# Patient Record
Sex: FEMALE | Employment: PART TIME | URBAN - METROPOLITAN AREA
[De-identification: names, ages, dates, MRNs, and addresses within clinical notes are randomized per-mention and may not be internally consistent; named-entity substitution may affect disease eponyms.]

---

## 2022-05-26 ENCOUNTER — HOSPITAL ENCOUNTER (OUTPATIENT)
Dept: PREADMISSION TESTING | Age: 67
Discharge: HOME | End: 2022-05-30
Payer: MEDICARE

## 2022-05-26 VITALS
HEART RATE: 78 BPM | SYSTOLIC BLOOD PRESSURE: 138 MMHG | DIASTOLIC BLOOD PRESSURE: 78 MMHG | HEIGHT: 65 IN | RESPIRATION RATE: 16 BRPM | BODY MASS INDEX: 38.49 KG/M2 | OXYGEN SATURATION: 98 % | TEMPERATURE: 97.5 F | WEIGHT: 231 LBS

## 2022-05-26 LAB
-: NORMAL
ABO/RH: NORMAL
ABSOLUTE EOS #: 0.17 K/UL (ref 0–0.44)
ABSOLUTE IMMATURE GRANULOCYTE: 0.04 K/UL (ref 0–0.3)
ABSOLUTE LYMPH #: 2.12 K/UL (ref 1.1–3.7)
ABSOLUTE MONO #: 0.76 K/UL (ref 0.1–1.2)
ALBUMIN SERPL-MCNC: 4.5 G/DL (ref 3.5–5.2)
ALP BLD-CCNC: 74 U/L (ref 35–104)
ALT SERPL-CCNC: 12 U/L (ref 5–33)
ANION GAP SERPL CALCULATED.3IONS-SCNC: 9 MMOL/L (ref 9–17)
ANTIBODY SCREEN: NEGATIVE
ARM BAND NUMBER: NORMAL
AST SERPL-CCNC: 19 U/L
BASOPHILS # BLD: 1 % (ref 0–2)
BASOPHILS ABSOLUTE: 0.05 K/UL (ref 0–0.2)
BILIRUB SERPL-MCNC: 0.26 MG/DL (ref 0.3–1.2)
BILIRUBIN URINE: NEGATIVE
BUN BLDV-MCNC: 16 MG/DL (ref 8–23)
BUN/CREAT BLD: 16 (ref 9–20)
CALCIUM SERPL-MCNC: 9.6 MG/DL (ref 8.6–10.4)
CHLORIDE BLD-SCNC: 104 MMOL/L (ref 98–107)
CO2: 27 MMOL/L (ref 20–31)
COLOR: YELLOW
CREAT SERPL-MCNC: 1 MG/DL (ref 0.5–0.9)
EOSINOPHILS RELATIVE PERCENT: 2 % (ref 1–4)
EPITHELIAL CELLS UA: NORMAL /HPF (ref 0–5)
EXPIRATION DATE: NORMAL
GFR AFRICAN AMERICAN: >60 ML/MIN
GFR NON-AFRICAN AMERICAN: 55 ML/MIN
GFR SERPL CREATININE-BSD FRML MDRD: ABNORMAL ML/MIN/{1.73_M2}
GLUCOSE BLD-MCNC: 126 MG/DL (ref 70–99)
GLUCOSE URINE: NEGATIVE
HCT VFR BLD CALC: 36.7 % (ref 36.3–47.1)
HEMOGLOBIN: 11.8 G/DL (ref 11.9–15.1)
IMMATURE GRANULOCYTES: 1 %
INR BLD: 0.9
KETONES, URINE: NEGATIVE
LEUKOCYTE ESTERASE, URINE: NEGATIVE
LYMPHOCYTES # BLD: 25 % (ref 24–43)
MCH RBC QN AUTO: 31.8 PG (ref 25.2–33.5)
MCHC RBC AUTO-ENTMCNC: 32.2 G/DL (ref 28.4–34.8)
MCV RBC AUTO: 98.9 FL (ref 82.6–102.9)
MONOCYTES # BLD: 9 % (ref 3–12)
NITRITE, URINE: NEGATIVE
NRBC AUTOMATED: 0 PER 100 WBC
PARTIAL THROMBOPLASTIN TIME: 27.5 SEC (ref 23.9–33.8)
PDW BLD-RTO: 12.7 % (ref 11.8–14.4)
PH UA: 5.5 (ref 5–8)
PLATELET # BLD: 253 K/UL (ref 138–453)
PMV BLD AUTO: 10.3 FL (ref 8.1–13.5)
POTASSIUM SERPL-SCNC: 4 MMOL/L (ref 3.7–5.3)
PROTEIN UA: NEGATIVE
PROTHROMBIN TIME: 12.2 SEC (ref 11.5–14.2)
RBC # BLD: 3.71 M/UL (ref 3.95–5.11)
RBC UA: NORMAL /HPF (ref 0–2)
SEDIMENTATION RATE, ERYTHROCYTE: 5 MM/HR (ref 0–30)
SEG NEUTROPHILS: 62 % (ref 36–65)
SEGMENTED NEUTROPHILS ABSOLUTE COUNT: 5.24 K/UL (ref 1.5–8.1)
SODIUM BLD-SCNC: 140 MMOL/L (ref 135–144)
SPECIFIC GRAVITY UA: 1.01 (ref 1–1.03)
TOTAL PROTEIN: 6.8 G/DL (ref 6.4–8.3)
TURBIDITY: CLEAR
URINE HGB: ABNORMAL
UROBILINOGEN, URINE: NORMAL
WBC # BLD: 8.4 K/UL (ref 3.5–11.3)
WBC UA: NORMAL /HPF (ref 0–5)

## 2022-05-26 PROCEDURE — 86900 BLOOD TYPING SEROLOGIC ABO: CPT

## 2022-05-26 PROCEDURE — 93005 ELECTROCARDIOGRAM TRACING: CPT | Performed by: ORTHOPAEDIC SURGERY

## 2022-05-26 PROCEDURE — 85025 COMPLETE CBC W/AUTO DIFF WBC: CPT

## 2022-05-26 PROCEDURE — 85610 PROTHROMBIN TIME: CPT

## 2022-05-26 PROCEDURE — 85652 RBC SED RATE AUTOMATED: CPT

## 2022-05-26 PROCEDURE — 80053 COMPREHEN METABOLIC PANEL: CPT

## 2022-05-26 PROCEDURE — 86850 RBC ANTIBODY SCREEN: CPT

## 2022-05-26 PROCEDURE — 36415 COLL VENOUS BLD VENIPUNCTURE: CPT

## 2022-05-26 PROCEDURE — 86901 BLOOD TYPING SEROLOGIC RH(D): CPT

## 2022-05-26 PROCEDURE — 81001 URINALYSIS AUTO W/SCOPE: CPT

## 2022-05-26 PROCEDURE — 87641 MR-STAPH DNA AMP PROBE: CPT

## 2022-05-26 PROCEDURE — 85730 THROMBOPLASTIN TIME PARTIAL: CPT

## 2022-05-26 RX ORDER — SIMVASTATIN 10 MG
10 TABLET ORAL NIGHTLY
COMMUNITY
Start: 2021-05-17

## 2022-05-26 RX ORDER — OXYCODONE HYDROCHLORIDE AND ACETAMINOPHEN 5; 325 MG/1; MG/1
1 TABLET ORAL EVERY 4 HOURS PRN
COMMUNITY
Start: 2018-12-03

## 2022-05-26 RX ORDER — LEVOTHYROXINE SODIUM 88 UG/1
88 TABLET ORAL DAILY
COMMUNITY
Start: 2013-04-24

## 2022-05-26 RX ORDER — POTASSIUM CHLORIDE 750 MG/1
10 TABLET, FILM COATED, EXTENDED RELEASE ORAL PRN
COMMUNITY

## 2022-05-26 RX ORDER — CELECOXIB 200 MG/1
200 CAPSULE ORAL ONCE
Status: CANCELLED | OUTPATIENT
Start: 2022-06-20

## 2022-05-26 RX ORDER — RIZATRIPTAN BENZOATE 5 MG/1
5 TABLET ORAL
COMMUNITY
Start: 2020-05-29

## 2022-05-26 RX ORDER — CHLORAL HYDRATE 500 MG
2000 CAPSULE ORAL DAILY
Status: ON HOLD | COMMUNITY
End: 2022-07-01

## 2022-05-26 RX ORDER — FUROSEMIDE 20 MG/1
20 TABLET ORAL
COMMUNITY
Start: 2021-12-03

## 2022-05-26 RX ORDER — DICLOFENAC SODIUM 75 MG/1
75 TABLET, DELAYED RELEASE ORAL 2 TIMES DAILY
Status: ON HOLD | COMMUNITY
Start: 2017-04-23 | End: 2022-06-20 | Stop reason: HOSPADM

## 2022-05-26 RX ORDER — ACETAMINOPHEN 500 MG
1000 TABLET ORAL ONCE
Status: CANCELLED | OUTPATIENT
Start: 2022-06-20

## 2022-05-26 RX ORDER — OMEPRAZOLE 40 MG/1
40 CAPSULE, DELAYED RELEASE ORAL DAILY
COMMUNITY
Start: 2013-04-24

## 2022-05-26 RX ORDER — UBIDECARENONE 200 MG
1 CAPSULE ORAL DAILY
COMMUNITY
Start: 2020-06-06

## 2022-05-26 RX ORDER — AMITRIPTYLINE HYDROCHLORIDE 50 MG/1
75 TABLET, FILM COATED ORAL DAILY
Status: ON HOLD | COMMUNITY
Start: 2017-04-23 | End: 2022-06-20 | Stop reason: ALTCHOICE

## 2022-05-26 RX ORDER — DENOSUMAB 60 MG/ML
60 INJECTION SUBCUTANEOUS ONCE
COMMUNITY

## 2022-05-26 RX ORDER — DILTIAZEM HYDROCHLORIDE 240 MG/1
240 CAPSULE, EXTENDED RELEASE ORAL DAILY
COMMUNITY
Start: 2021-08-02

## 2022-05-26 RX ORDER — GABAPENTIN 300 MG/1
300 CAPSULE ORAL ONCE
Status: CANCELLED | OUTPATIENT
Start: 2022-06-20

## 2022-05-26 RX ORDER — LIOTHYRONINE SODIUM 5 UG/1
5 TABLET ORAL DAILY
COMMUNITY
Start: 2013-04-24

## 2022-05-26 RX ORDER — ZOLPIDEM TARTRATE 6.25 MG/1
1 TABLET, FILM COATED, EXTENDED RELEASE ORAL EVERY EVENING
COMMUNITY
Start: 2021-12-13

## 2022-05-26 RX ORDER — METOPROLOL SUCCINATE 200 MG/1
200 TABLET, EXTENDED RELEASE ORAL DAILY
COMMUNITY
Start: 2021-05-18

## 2022-05-26 RX ORDER — LOSARTAN POTASSIUM 100 MG/1
1 TABLET ORAL DAILY
COMMUNITY
Start: 2021-12-13

## 2022-05-26 ASSESSMENT — KOOS JR
KOOS JR TOTAL INTERVAL SCORE: 57.14
STRAIGHTENING KNEE FULLY: 1
STANDING UPRIGHT: 1
RISING FROM SITTING: 2
GOING UP OR DOWN STAIRS: 2
HOW SEVERE IS YOUR KNEE STIFFNESS AFTER FIRST WAKING IN MORNING: 2
TWISING OR PIVOTING ON KNEE: 2
BENDING TO THE FLOOR TO PICK UP OBJECT: 2

## 2022-05-26 ASSESSMENT — PROMIS GLOBAL HEALTH SCALE
IN GENERAL, HOW WOULD YOU RATE YOUR PHYSICAL HEALTH [ON A SCALE OF 1 (POOR) TO 5 (EXCELLENT)]?: 3
IN THE PAST 7 DAYS, HOW OFTEN HAVE YOU BEEN BOTHERED BY EMOTIONAL PROBLEMS, SUCH AS FEELING ANXIOUS, DEPRESSED, OR IRRITABLE [ON A SCALE FROM 1 (NEVER) TO 5 (ALWAYS)]?: 2
IN GENERAL, WOULD YOU SAY YOUR HEALTH IS...[ON A SCALE OF 1 (POOR) TO 5 (EXCELLENT)]: 3
IN THE PAST 7 DAYS, HOW WOULD YOU RATE YOUR FATIGUE ON AVERAGE [ON A SCALE FROM 1 (NONE) TO 5 (VERY SEVERE)]?: 3
IN GENERAL, HOW WOULD YOU RATE YOUR SATISFACTION WITH YOUR SOCIAL ACTIVITIES AND RELATIONSHIPS [ON A SCALE OF 1 (POOR) TO 5 (EXCELLENT)]?: 5
IN GENERAL, WOULD YOU SAY YOUR QUALITY OF LIFE IS...[ON A SCALE OF 1 (POOR) TO 5 (EXCELLENT)]: 4
IN GENERAL, HOW WOULD YOU RATE YOUR MENTAL HEALTH, INCLUDING YOUR MOOD AND YOUR ABILITY TO THINK [ON A SCALE OF 1 (POOR) TO 5 (EXCELLENT)]?: 5
SUM OF RESPONSES TO QUESTIONS 2, 4, 5, & 10: 16
TO WHAT EXTENT ARE YOU ABLE TO CARRY OUT YOUR EVERYDAY PHYSICAL ACTIVITIES SUCH AS WALKING, CLIMBING STAIRS, CARRYING GROCERIES, OR MOVING A CHAIR [ON A SCALE OF 1 (NOT AT ALL) TO 5 (COMPLETELY)]?: 3
IN GENERAL, PLEASE RATE HOW WELL YOU CARRY OUT YOUR USUAL SOCIAL ACTIVITIES (INCLUDES ACTIVITIES AT HOME, AT WORK, AND IN YOUR COMMUNITY, AND RESPONSIBILITIES AS A PARENT, CHILD, SPOUSE, EMPLOYEE, FRIEND, ETC) [ON A SCALE OF 1 (POOR) TO 5 (EXCELLENT)]?: 3
WHO IS THE PERSON COMPLETING THE PROMIS V1.1 SURVEY?: 0
SUM OF RESPONSES TO QUESTIONS 3, 6, 7, & 8: 17
IN THE PAST 7 DAYS, HOW WOULD YOU RATE YOUR PAIN ON AVERAGE [ON A SCALE FROM 0 (NO PAIN) TO 10 (WORST IMAGINABLE PAIN)]?: 8
HOW IS THE PROMIS V1.1 BEING ADMINISTERED?: 0

## 2022-05-26 ASSESSMENT — PAIN - FUNCTIONAL ASSESSMENT: PAIN_FUNCTIONAL_ASSESSMENT: PREVENTS OR INTERFERES SOME ACTIVE ACTIVITIES AND ADLS

## 2022-05-26 ASSESSMENT — PAIN DESCRIPTION - DESCRIPTORS: DESCRIPTORS: STABBING

## 2022-05-26 ASSESSMENT — PAIN DESCRIPTION - ORIENTATION: ORIENTATION: LEFT

## 2022-05-26 ASSESSMENT — PAIN DESCRIPTION - FREQUENCY: FREQUENCY: CONTINUOUS

## 2022-05-26 ASSESSMENT — PAIN DESCRIPTION - ONSET: ONSET: ON-GOING

## 2022-05-26 ASSESSMENT — PAIN DESCRIPTION - LOCATION: LOCATION: KNEE

## 2022-05-26 ASSESSMENT — PAIN SCALES - GENERAL: PAINLEVEL_OUTOF10: 8

## 2022-05-26 ASSESSMENT — PAIN DESCRIPTION - PAIN TYPE: TYPE: CHRONIC PAIN

## 2022-05-26 NOTE — PRE-PROCEDURE INSTRUCTIONS
ARRIVE AT [facility] ON Monday, June 20, 2022 at 5:30 AM    Once you enter the hospital lobby, take the elevators to the second floor. Check-In is at the surgery registration desk      Continue to take your home medications as you normally do up to and including the night before surgery with the exception of any blood thinning medications. Please stop any blood thinning medications as directed by your surgeon or prescribing physician. Failure to stop certain medications may interfere with your scheduled surgery. These may include:  Aspirin, Warfarin (Coumadin), Clopidogrel (Plavix), Ibuprofen (Motrin, Advil), Naproxen (Aleve), Meloxicam (Mobic), Celecoxib (Celebrex), Eliquis, Pradaxa, Xarelto, Effient, Fish Oil, Herbal supplements. OMEGA-3, Diclofenac, losartan      Please take the following medication(s) the day of surgery with a small sip of water:  Omeprazole, levothyroxine, diltiazem      PREPARING FOR YOUR SURGERY:     Before surgery, you can play an important role in your own health. Because skin is not sterile, we need to be sure that your skin is as free of germs as possible before surgery by carefully washing before surgery. Preparing or prepping skin before surgery can reduce the risk of a surgical site infection.   Do not shave the area of your body where your surgery will be performed unless you received specific permission from your physician. You will need to shower at home the night before surgery and the morning of surgery with a special soap called chlorhexidine gluconate (CHG*). *Not to be used by people allergic to Chlorhexidine Gluconate (CHG). Following these instructions will help you be sure that your skin is clean before surgery. Instructions on cleaning your skin before surgery: The night before your surgery:      You will need to shower with warm water (not hot) and the CHG soap.  Use a clean wash cloth and a clean towel.   Have clean clothes available to put on after the shower.   First wash your hair with regular shampoo. Rinse your hair and body thoroughly to remove the shampoo.  Wash your face and genital area (private parts) with your regular soap or water only. Thoroughly rinse your body with warm water from the neck down.  Turn water off to prevent rinsing the soap off too soon.  With a clean wet washcloth and half of the CHG soap in the bottle, lather your entire body from the neck down. Do not use CHG soap near your eyes or ears to avoid injury to those areas.  Wash thoroughly, paying special attention to the area where your surgery will be performed.  Wash your body gently for five (5) minutes. Avoid scrubbing your skin too hard.  Turn the water back on and rinse your body thoroughly.  Pat yourself dry with a clean, soft towel. Do not apply lotion, cream or powder.  Dress with clean freshly washed clothes. The morning of surgery:     Repeat shower following steps above - using remaining half of CHG soap in bottle. Patient Instructions:    Valderrama If you are having any type of anesthesia you are to have nothing to eat or drink after midnight the night before your surgery. This includes gum, hard candy, mints, water or smoking or chewing tobacco.  The only exception to this is a small sip of water to take with any morning dose of heart, blood pressure, or seizure medications. No alcoholic beverages for 24 hours prior to surgery.  Brush your teeth but do not swallow water.  Bring your eye glasses and case with you. No contacts are to be worn the day of surgery. You also may bring your hearing aids. Most surgical procedures involving anesthesia will require that you remove your dentures prior to surgery. · Do not wear any jewelry or body piercings day of surgery. Also, NO lotion, perfume or deodorant to be used the day of surgery.   No nail polish on the operative extremity (arm/leg surgeries)    · If you are staying overnight with us, please bring a small bag of necessary personal items.  Please wear loose, comfortable clothing. If you are potentially going to have a cast or brace bring clothing that will fit over them.  In case of illness - If you have cold or flu like symptoms (high fever, runny nose, sore throat, cough, etc.) rash, nausea, vomiting, loose stools, and/or recent contact with someone who has a contagious disease (chicken pox, measles, etc.) Please call your doctor before coming to the hospital.         Day of Surgery/Procedure:    As a patient at [facility] - INPATIENT you can expect quality medical and nursing care that is centered on your individual needs. Our goal is to make your surgical experience as comfortable as possible    . Transportation After Your Surgery/Procedure: You will need a friend or family member to drive you home after your procedure. Your  must be 25years of age or older and able to sign off on your discharge instructions. A taxi cab or any other form of public transportation is not acceptable. Your friend or family member must stay at the hospital throughout your procedure. Someone must remain with you for the first 24 hours after your surgery if you receive anesthesia or medication. If you do not have someone to stay with you, your procedure may be cancelled.       If you have any other questions regarding your procedure or the day of surgery, please call [phone]      _________________________  ____________________________  Signature (Patient)              Signature (Provider)               Date

## 2022-05-27 LAB
EKG ATRIAL RATE: 78 BPM
EKG P AXIS: 63 DEGREES
EKG P-R INTERVAL: 180 MS
EKG Q-T INTERVAL: 360 MS
EKG QRS DURATION: 92 MS
EKG QTC CALCULATION (BAZETT): 410 MS
EKG R AXIS: 10 DEGREES
EKG T AXIS: 21 DEGREES
EKG VENTRICULAR RATE: 78 BPM
MRSA, DNA, NASAL: NEGATIVE
SPECIMEN DESCRIPTION: NORMAL

## 2022-05-27 PROCEDURE — 93010 ELECTROCARDIOGRAM REPORT: CPT | Performed by: INTERNAL MEDICINE

## 2022-05-31 NOTE — PROGRESS NOTES
[facility] ([facility]) Joint Replacement Pre-surgical Assessment    Scheduled Surgery Date: 06/20/2022  Surgery Time: 0730    Surgeon: Shayy Chapman  Procedure: left Total Knee    Primary Insurance Coverage MEDICARE A&B  Pre-op class attended YES    PCP: Ruba Hirsch MD  Clearance received by PCP: Yes    Anticipated Discharge Plan: home  Agency (if applicable): UNSURE    Significant PMH:   Surgical History    Procedure Laterality Date Comment Source   ANKLE SURGERY Left  x3    BREAST BIOPSY Right      DENTAL SURGERY   pt has permanant implanted dentures    HYSTERECTOMY       KNEE ARTHROSCOPY Left      LYMPHADENECTOMY Right  axilla, not cancer    PAIN MANAGEMENT PROCEDURE   back ablations, last @/2022        ED Notes    ED Notes      Medical History    Diagnosis Date Comment Source   Arthritis      CAD (coronary artery disease)      CHF (congestive heart failure) (HCC)  pt has not been diagnosed however on medications for her heart and a diuretic, assumes CHF    CPAP (continuous positive airway pressure) dependence      DJD (degenerative joint disease)  left ankle, left knee, rght knee    Fibromyalgia      GERD (gastroesophageal reflux disease)      Hyperlipidemia      Hypertension      Mitral valve regurgitation      Seasonal allergies      Sleep apnea      Spinal stenosis      Thyroid disease  hashimotos             Smoking history: none    Alcohol history: Never drinks    Concerns prior to surgery: PT MET WITH PT/OT AFTER CLASS, UNSURE IF PT HAS A WALKER.     Electronically signed by: Codi Hall RN on 5/31/2022 at 1:20 PM

## 2022-06-20 ENCOUNTER — ANESTHESIA (OUTPATIENT)
Dept: OPERATING ROOM | Age: 67
End: 2022-06-20
Payer: MEDICARE

## 2022-06-20 ENCOUNTER — APPOINTMENT (OUTPATIENT)
Dept: GENERAL RADIOLOGY | Age: 67
End: 2022-06-20
Attending: ORTHOPAEDIC SURGERY
Payer: MEDICARE

## 2022-06-20 ENCOUNTER — HOSPITAL ENCOUNTER (OUTPATIENT)
Age: 67
Discharge: HOME | End: 2022-06-21
Attending: ORTHOPAEDIC SURGERY | Admitting: ORTHOPAEDIC SURGERY
Payer: MEDICARE

## 2022-06-20 ENCOUNTER — ANESTHESIA EVENT (OUTPATIENT)
Dept: OPERATING ROOM | Age: 67
End: 2022-06-20
Payer: MEDICARE

## 2022-06-20 PROBLEM — M17.12 PRIMARY OSTEOARTHRITIS OF LEFT KNEE: Chronic | Status: RESOLVED | Noted: 2022-06-20 | Resolved: 2022-06-20

## 2022-06-20 PROBLEM — M17.12 PRIMARY OSTEOARTHRITIS OF LEFT KNEE: Chronic | Status: ACTIVE | Noted: 2022-06-20

## 2022-06-20 PROBLEM — M17.12 PRIMARY OSTEOARTHRITIS OF LEFT KNEE: Status: ACTIVE | Noted: 2022-06-20

## 2022-06-20 PROBLEM — Z96.652 S/P TOTAL KNEE ARTHROPLASTY, LEFT: Status: ACTIVE | Noted: 2022-06-20

## 2022-06-20 PROCEDURE — 2580000003 HC RX 258: Performed by: ORTHOPAEDIC SURGERY

## 2022-06-20 PROCEDURE — 6360000002 HC RX W HCPCS: Performed by: ANESTHESIOLOGY

## 2022-06-20 PROCEDURE — 2500000003 HC RX 250 WO HCPCS: Performed by: ANESTHESIOLOGY

## 2022-06-20 PROCEDURE — C1713 ANCHOR/SCREW BN/BN,TIS/BN: HCPCS | Performed by: ORTHOPAEDIC SURGERY

## 2022-06-20 PROCEDURE — 7100000000 HC PACU RECOVERY - FIRST 15 MIN: Performed by: ORTHOPAEDIC SURGERY

## 2022-06-20 PROCEDURE — A4217 STERILE WATER/SALINE, 500 ML: HCPCS | Performed by: ORTHOPAEDIC SURGERY

## 2022-06-20 PROCEDURE — 97166 OT EVAL MOD COMPLEX 45 MIN: CPT

## 2022-06-20 PROCEDURE — 97110 THERAPEUTIC EXERCISES: CPT

## 2022-06-20 PROCEDURE — 3700000001 HC ADD 15 MINUTES (ANESTHESIA): Performed by: ORTHOPAEDIC SURGERY

## 2022-06-20 PROCEDURE — 2500000003 HC RX 250 WO HCPCS: Performed by: NURSE ANESTHETIST, CERTIFIED REGISTERED

## 2022-06-20 PROCEDURE — 3600000015 HC SURGERY LEVEL 5 ADDTL 15MIN: Performed by: ORTHOPAEDIC SURGERY

## 2022-06-20 PROCEDURE — 6360000002 HC RX W HCPCS: Performed by: NURSE ANESTHETIST, CERTIFIED REGISTERED

## 2022-06-20 PROCEDURE — 6360000002 HC RX W HCPCS: Performed by: ORTHOPAEDIC SURGERY

## 2022-06-20 PROCEDURE — 3700000000 HC ANESTHESIA ATTENDED CARE: Performed by: ORTHOPAEDIC SURGERY

## 2022-06-20 PROCEDURE — 2580000003 HC RX 258: Performed by: NURSE ANESTHETIST, CERTIFIED REGISTERED

## 2022-06-20 PROCEDURE — 76942 ECHO GUIDE FOR BIOPSY: CPT | Performed by: ANESTHESIOLOGY

## 2022-06-20 PROCEDURE — 7100000001 HC PACU RECOVERY - ADDTL 15 MIN: Performed by: ORTHOPAEDIC SURGERY

## 2022-06-20 PROCEDURE — C9290 INJ, BUPIVACAINE LIPOSOME: HCPCS | Performed by: ANESTHESIOLOGY

## 2022-06-20 PROCEDURE — 3600000005 HC SURGERY LEVEL 5 BASE: Performed by: ORTHOPAEDIC SURGERY

## 2022-06-20 PROCEDURE — 97162 PT EVAL MOD COMPLEX 30 MIN: CPT

## 2022-06-20 PROCEDURE — C1776 JOINT DEVICE (IMPLANTABLE): HCPCS | Performed by: ORTHOPAEDIC SURGERY

## 2022-06-20 PROCEDURE — 6370000000 HC RX 637 (ALT 250 FOR IP): Performed by: ANESTHESIOLOGY

## 2022-06-20 PROCEDURE — 73560 X-RAY EXAM OF KNEE 1 OR 2: CPT

## 2022-06-20 PROCEDURE — 6370000000 HC RX 637 (ALT 250 FOR IP): Performed by: ORTHOPAEDIC SURGERY

## 2022-06-20 PROCEDURE — 2709999900 HC NON-CHARGEABLE SUPPLY: Performed by: ORTHOPAEDIC SURGERY

## 2022-06-20 PROCEDURE — 97530 THERAPEUTIC ACTIVITIES: CPT

## 2022-06-20 PROCEDURE — 97535 SELF CARE MNGMENT TRAINING: CPT

## 2022-06-20 PROCEDURE — 2500000003 HC RX 250 WO HCPCS: Performed by: ORTHOPAEDIC SURGERY

## 2022-06-20 PROCEDURE — 2720000010 HC SURG SUPPLY STERILE: Performed by: ORTHOPAEDIC SURGERY

## 2022-06-20 DEVICE — IMPLANT PAT 35X7MM CR IPOLY ITOTAL: Type: IMPLANTABLE DEVICE | Site: KNEE | Status: FUNCTIONAL

## 2022-06-20 DEVICE — CEMENT BNE 40GM FULL DOSE PMMA W/O ANTIBIO HI VISC N RADPQ: Type: IMPLANTABLE DEVICE | Site: KNEE | Status: FUNCTIONAL

## 2022-06-20 RX ORDER — HYDROMORPHONE HYDROCHLORIDE 2 MG/1
1 TABLET ORAL EVERY 4 HOURS PRN
Status: DISCONTINUED | OUTPATIENT
Start: 2022-06-20 | End: 2022-06-21 | Stop reason: HOSPADM

## 2022-06-20 RX ORDER — PHENYLEPHRINE HCL IN 0.9% NACL 1 MG/10 ML
SYRINGE (ML) INTRAVENOUS PRN
Status: DISCONTINUED | OUTPATIENT
Start: 2022-06-20 | End: 2022-06-20 | Stop reason: SDUPTHER

## 2022-06-20 RX ORDER — SODIUM CHLORIDE 9 MG/ML
INJECTION, SOLUTION INTRAVENOUS PRN
Status: DISCONTINUED | OUTPATIENT
Start: 2022-06-20 | End: 2022-06-20 | Stop reason: HOSPADM

## 2022-06-20 RX ORDER — OMEPRAZOLE 40 MG/1
40 CAPSULE, DELAYED RELEASE ORAL
Status: DISCONTINUED | OUTPATIENT
Start: 2022-06-21 | End: 2022-06-21 | Stop reason: HOSPADM

## 2022-06-20 RX ORDER — SODIUM CHLORIDE, SODIUM LACTATE, POTASSIUM CHLORIDE, CALCIUM CHLORIDE 600; 310; 30; 20 MG/100ML; MG/100ML; MG/100ML; MG/100ML
INJECTION, SOLUTION INTRAVENOUS CONTINUOUS PRN
Status: DISCONTINUED | OUTPATIENT
Start: 2022-06-20 | End: 2022-06-20 | Stop reason: SDUPTHER

## 2022-06-20 RX ORDER — HYDROMORPHONE HYDROCHLORIDE 1 MG/ML
0.25 INJECTION, SOLUTION INTRAMUSCULAR; INTRAVENOUS; SUBCUTANEOUS EVERY 5 MIN PRN
Status: DISCONTINUED | OUTPATIENT
Start: 2022-06-20 | End: 2022-06-20 | Stop reason: HOSPADM

## 2022-06-20 RX ORDER — LOSARTAN POTASSIUM 100 MG/1
100 TABLET ORAL DAILY
Status: DISCONTINUED | OUTPATIENT
Start: 2022-06-21 | End: 2022-06-21 | Stop reason: HOSPADM

## 2022-06-20 RX ORDER — HYDROMORPHONE HCL 110MG/55ML
PATIENT CONTROLLED ANALGESIA SYRINGE INTRAVENOUS PRN
Status: DISCONTINUED | OUTPATIENT
Start: 2022-06-20 | End: 2022-06-20 | Stop reason: SDUPTHER

## 2022-06-20 RX ORDER — SODIUM CHLORIDE, SODIUM LACTATE, POTASSIUM CHLORIDE, CALCIUM CHLORIDE 600; 310; 30; 20 MG/100ML; MG/100ML; MG/100ML; MG/100ML
INJECTION, SOLUTION INTRAVENOUS CONTINUOUS
Status: DISCONTINUED | OUTPATIENT
Start: 2022-06-20 | End: 2022-06-20

## 2022-06-20 RX ORDER — OMEGA-3/DHA/EPA/FISH OIL 300-1000MG
2 CAPSULE ORAL 2 TIMES DAILY
Status: ON HOLD | COMMUNITY
End: 2022-07-01

## 2022-06-20 RX ORDER — LIDOCAINE HYDROCHLORIDE 20 MG/ML
INJECTION, SOLUTION EPIDURAL; INFILTRATION; INTRACAUDAL; PERINEURAL PRN
Status: DISCONTINUED | OUTPATIENT
Start: 2022-06-20 | End: 2022-06-20 | Stop reason: SDUPTHER

## 2022-06-20 RX ORDER — KETAMINE HCL IN NACL, ISO-OSM 100MG/10ML
SYRINGE (ML) INJECTION PRN
Status: DISCONTINUED | OUTPATIENT
Start: 2022-06-20 | End: 2022-06-20 | Stop reason: SDUPTHER

## 2022-06-20 RX ORDER — MORPHINE SULFATE 2 MG/ML
2 INJECTION, SOLUTION INTRAMUSCULAR; INTRAVENOUS
Status: DISCONTINUED | OUTPATIENT
Start: 2022-06-20 | End: 2022-06-21 | Stop reason: HOSPADM

## 2022-06-20 RX ORDER — MORPHINE SULFATE 4 MG/ML
4 INJECTION, SOLUTION INTRAMUSCULAR; INTRAVENOUS
Status: DISCONTINUED | OUTPATIENT
Start: 2022-06-20 | End: 2022-06-21 | Stop reason: HOSPADM

## 2022-06-20 RX ORDER — ONDANSETRON 2 MG/ML
INJECTION INTRAMUSCULAR; INTRAVENOUS PRN
Status: DISCONTINUED | OUTPATIENT
Start: 2022-06-20 | End: 2022-06-20 | Stop reason: SDUPTHER

## 2022-06-20 RX ORDER — DILTIAZEM HYDROCHLORIDE 240 MG/1
240 CAPSULE, COATED, EXTENDED RELEASE ORAL DAILY
Status: DISCONTINUED | OUTPATIENT
Start: 2022-06-21 | End: 2022-06-21 | Stop reason: HOSPADM

## 2022-06-20 RX ORDER — LEVOTHYROXINE SODIUM 88 UG/1
88 TABLET ORAL DAILY
Status: DISCONTINUED | OUTPATIENT
Start: 2022-06-21 | End: 2022-06-21 | Stop reason: HOSPADM

## 2022-06-20 RX ORDER — MAGNESIUM HYDROXIDE 1200 MG/15ML
LIQUID ORAL CONTINUOUS PRN
Status: COMPLETED | OUTPATIENT
Start: 2022-06-20 | End: 2022-06-20

## 2022-06-20 RX ORDER — SODIUM CHLORIDE 0.9 % (FLUSH) 0.9 %
5-40 SYRINGE (ML) INJECTION EVERY 12 HOURS SCHEDULED
Status: DISCONTINUED | OUTPATIENT
Start: 2022-06-20 | End: 2022-06-21 | Stop reason: HOSPADM

## 2022-06-20 RX ORDER — FENTANYL CITRATE 50 UG/ML
INJECTION, SOLUTION INTRAMUSCULAR; INTRAVENOUS PRN
Status: DISCONTINUED | OUTPATIENT
Start: 2022-06-20 | End: 2022-06-20 | Stop reason: SDUPTHER

## 2022-06-20 RX ORDER — TIZANIDINE 4 MG/1
4 TABLET ORAL EVERY 12 HOURS
COMMUNITY

## 2022-06-20 RX ORDER — HYDROMORPHONE HYDROCHLORIDE 2 MG/1
2 TABLET ORAL EVERY 4 HOURS PRN
Status: DISCONTINUED | OUTPATIENT
Start: 2022-06-20 | End: 2022-06-21 | Stop reason: HOSPADM

## 2022-06-20 RX ORDER — LIDOCAINE HYDROCHLORIDE 10 MG/ML
1 INJECTION, SOLUTION EPIDURAL; INFILTRATION; INTRACAUDAL; PERINEURAL
Status: DISCONTINUED | OUTPATIENT
Start: 2022-06-21 | End: 2022-06-20 | Stop reason: HOSPADM

## 2022-06-20 RX ORDER — SODIUM CHLORIDE 0.9 % (FLUSH) 0.9 %
5-40 SYRINGE (ML) INJECTION PRN
Status: DISCONTINUED | OUTPATIENT
Start: 2022-06-20 | End: 2022-06-20 | Stop reason: HOSPADM

## 2022-06-20 RX ORDER — AMITRIPTYLINE HYDROCHLORIDE 50 MG/1
75 TABLET, FILM COATED ORAL NIGHTLY PRN
Status: DISCONTINUED | OUTPATIENT
Start: 2022-06-20 | End: 2022-06-21 | Stop reason: HOSPADM

## 2022-06-20 RX ORDER — SIMVASTATIN 10 MG
10 TABLET ORAL DAILY
Status: DISCONTINUED | OUTPATIENT
Start: 2022-06-20 | End: 2022-06-21 | Stop reason: HOSPADM

## 2022-06-20 RX ORDER — TRANEXAMIC ACID 100 MG/ML
INJECTION, SOLUTION INTRAVENOUS PRN
Status: DISCONTINUED | OUTPATIENT
Start: 2022-06-20 | End: 2022-06-20 | Stop reason: SDUPTHER

## 2022-06-20 RX ORDER — SODIUM CHLORIDE 0.9 % (FLUSH) 0.9 %
5-40 SYRINGE (ML) INJECTION EVERY 12 HOURS SCHEDULED
Status: DISCONTINUED | OUTPATIENT
Start: 2022-06-20 | End: 2022-06-20 | Stop reason: HOSPADM

## 2022-06-20 RX ORDER — DEXAMETHASONE SODIUM PHOSPHATE 10 MG/ML
INJECTION, SOLUTION INTRAMUSCULAR; INTRAVENOUS PRN
Status: DISCONTINUED | OUTPATIENT
Start: 2022-06-20 | End: 2022-06-20 | Stop reason: SDUPTHER

## 2022-06-20 RX ORDER — PROPOFOL 10 MG/ML
INJECTION, EMULSION INTRAVENOUS CONTINUOUS PRN
Status: DISCONTINUED | OUTPATIENT
Start: 2022-06-20 | End: 2022-06-20 | Stop reason: SDUPTHER

## 2022-06-20 RX ORDER — ONDANSETRON 2 MG/ML
4 INJECTION INTRAMUSCULAR; INTRAVENOUS
Status: DISCONTINUED | OUTPATIENT
Start: 2022-06-20 | End: 2022-06-20 | Stop reason: HOSPADM

## 2022-06-20 RX ORDER — ACETAMINOPHEN 325 MG/1
650 TABLET ORAL EVERY 6 HOURS
Status: DISCONTINUED | OUTPATIENT
Start: 2022-06-20 | End: 2022-06-21 | Stop reason: HOSPADM

## 2022-06-20 RX ORDER — ROCURONIUM BROMIDE 10 MG/ML
INJECTION, SOLUTION INTRAVENOUS PRN
Status: DISCONTINUED | OUTPATIENT
Start: 2022-06-20 | End: 2022-06-20 | Stop reason: SDUPTHER

## 2022-06-20 RX ORDER — TIZANIDINE 4 MG/1
2 TABLET ORAL EVERY 8 HOURS PRN
Status: DISCONTINUED | OUTPATIENT
Start: 2022-06-20 | End: 2022-06-21 | Stop reason: HOSPADM

## 2022-06-20 RX ORDER — FENTANYL CITRATE 50 UG/ML
25 INJECTION, SOLUTION INTRAMUSCULAR; INTRAVENOUS EVERY 5 MIN PRN
Status: DISCONTINUED | OUTPATIENT
Start: 2022-06-20 | End: 2022-06-20 | Stop reason: HOSPADM

## 2022-06-20 RX ORDER — MIDAZOLAM HYDROCHLORIDE 1 MG/ML
2 INJECTION INTRAMUSCULAR; INTRAVENOUS
Status: COMPLETED | OUTPATIENT
Start: 2022-06-20 | End: 2022-06-20

## 2022-06-20 RX ORDER — TRANEXAMIC ACID 100 MG/ML
INJECTION, SOLUTION INTRAVENOUS
Status: COMPLETED
Start: 2022-06-20 | End: 2022-06-20

## 2022-06-20 RX ORDER — RIZATRIPTAN BENZOATE 5 MG/1
TABLET, ORALLY DISINTEGRATING ORAL
Status: ON HOLD | COMMUNITY
End: 2022-06-20

## 2022-06-20 RX ORDER — GABAPENTIN 300 MG/1
300 CAPSULE ORAL ONCE
Status: COMPLETED | OUTPATIENT
Start: 2022-06-20 | End: 2022-06-20

## 2022-06-20 RX ORDER — AMITRIPTYLINE HYDROCHLORIDE 50 MG/1
50 TABLET, FILM COATED ORAL NIGHTLY
COMMUNITY

## 2022-06-20 RX ORDER — KETOROLAC TROMETHAMINE 15 MG/ML
15 INJECTION, SOLUTION INTRAMUSCULAR; INTRAVENOUS EVERY 6 HOURS PRN
Status: DISPENSED | OUTPATIENT
Start: 2022-06-20 | End: 2022-06-20

## 2022-06-20 RX ORDER — LIOTHYRONINE SODIUM 5 UG/1
5 TABLET ORAL DAILY
Status: DISCONTINUED | OUTPATIENT
Start: 2022-06-20 | End: 2022-06-21 | Stop reason: HOSPADM

## 2022-06-20 RX ORDER — BUPIVACAINE HYDROCHLORIDE 2.5 MG/ML
INJECTION, SOLUTION INFILTRATION; PERINEURAL
Status: COMPLETED | OUTPATIENT
Start: 2022-06-20 | End: 2022-06-20

## 2022-06-20 RX ORDER — DILTIAZEM HYDROCHLORIDE 120 MG/1
240 CAPSULE, COATED, EXTENDED RELEASE ORAL DAILY
Status: DISCONTINUED | OUTPATIENT
Start: 2022-06-20 | End: 2022-06-20 | Stop reason: CLARIF

## 2022-06-20 RX ORDER — ACETAMINOPHEN 500 MG
1000 TABLET ORAL ONCE
Status: COMPLETED | OUTPATIENT
Start: 2022-06-20 | End: 2022-06-20

## 2022-06-20 RX ORDER — SODIUM CHLORIDE, SODIUM LACTATE, POTASSIUM CHLORIDE, CALCIUM CHLORIDE 600; 310; 30; 20 MG/100ML; MG/100ML; MG/100ML; MG/100ML
INJECTION, SOLUTION INTRAVENOUS CONTINUOUS
Status: DISCONTINUED | OUTPATIENT
Start: 2022-06-21 | End: 2022-06-20

## 2022-06-20 RX ORDER — CELECOXIB 200 MG/1
200 CAPSULE ORAL ONCE
Status: COMPLETED | OUTPATIENT
Start: 2022-06-20 | End: 2022-06-20

## 2022-06-20 RX ORDER — SODIUM CHLORIDE 9 MG/ML
INJECTION, SOLUTION INTRAVENOUS PRN
Status: DISCONTINUED | OUTPATIENT
Start: 2022-06-20 | End: 2022-06-21 | Stop reason: HOSPADM

## 2022-06-20 RX ORDER — CLINDAMYCIN PHOSPHATE 900 MG/50ML
900 INJECTION INTRAVENOUS ONCE
Status: COMPLETED | OUTPATIENT
Start: 2022-06-20 | End: 2022-06-20

## 2022-06-20 RX ORDER — POTASSIUM CHLORIDE 750 MG/1
10 CAPSULE, EXTENDED RELEASE ORAL DAILY
Status: DISCONTINUED | OUTPATIENT
Start: 2022-06-20 | End: 2022-06-21 | Stop reason: HOSPADM

## 2022-06-20 RX ORDER — SODIUM CHLORIDE 0.9 % (FLUSH) 0.9 %
5-40 SYRINGE (ML) INJECTION PRN
Status: DISCONTINUED | OUTPATIENT
Start: 2022-06-20 | End: 2022-06-21 | Stop reason: HOSPADM

## 2022-06-20 RX ADMIN — Medication 25 MG: at 07:49

## 2022-06-20 RX ADMIN — HYDROMORPHONE HYDROCHLORIDE 0.3 MG: 2 INJECTION INTRAMUSCULAR; INTRAVENOUS; SUBCUTANEOUS at 08:57

## 2022-06-20 RX ADMIN — Medication 50 MCG: at 08:17

## 2022-06-20 RX ADMIN — Medication 25 MG: at 09:04

## 2022-06-20 RX ADMIN — CLINDAMYCIN PHOSPHATE 900 MG: 900 INJECTION, SOLUTION INTRAVENOUS at 07:41

## 2022-06-20 RX ADMIN — Medication 100 MCG: at 08:04

## 2022-06-20 RX ADMIN — ACETAMINOPHEN 1000 MG: 500 TABLET ORAL at 07:06

## 2022-06-20 RX ADMIN — TRANEXAMIC ACID 1000 MG: 100 INJECTION, SOLUTION INTRAVENOUS at 09:34

## 2022-06-20 RX ADMIN — ACETAMINOPHEN 650 MG: 325 TABLET ORAL at 12:53

## 2022-06-20 RX ADMIN — HYDROMORPHONE HYDROCHLORIDE 2 MG: 2 TABLET ORAL at 12:35

## 2022-06-20 RX ADMIN — ROCURONIUM BROMIDE 50 MG: 10 INJECTION, SOLUTION INTRAVENOUS at 07:34

## 2022-06-20 RX ADMIN — KETOROLAC TROMETHAMINE 15 MG: 15 INJECTION, SOLUTION INTRAMUSCULAR; INTRAVENOUS at 15:54

## 2022-06-20 RX ADMIN — DEXAMETHASONE SODIUM PHOSPHATE 10 MG: 10 INJECTION, SOLUTION INTRAMUSCULAR; INTRAVENOUS at 07:51

## 2022-06-20 RX ADMIN — Medication 10 MG: at 23:44

## 2022-06-20 RX ADMIN — BUPIVACAINE 10 ML: 13.3 INJECTION, SUSPENSION, LIPOSOMAL INFILTRATION at 07:10

## 2022-06-20 RX ADMIN — TIZANIDINE 2 MG: 4 TABLET ORAL at 23:44

## 2022-06-20 RX ADMIN — FENTANYL CITRATE 25 MCG: 50 INJECTION, SOLUTION INTRAMUSCULAR; INTRAVENOUS at 11:47

## 2022-06-20 RX ADMIN — HYDROMORPHONE HYDROCHLORIDE 0.4 MG: 2 INJECTION INTRAMUSCULAR; INTRAVENOUS; SUBCUTANEOUS at 08:50

## 2022-06-20 RX ADMIN — MORPHINE SULFATE 4 MG: 4 INJECTION, SOLUTION INTRAMUSCULAR; INTRAVENOUS at 21:11

## 2022-06-20 RX ADMIN — ASPIRIN 325 MG: 325 TABLET, COATED ORAL at 21:11

## 2022-06-20 RX ADMIN — GABAPENTIN 300 MG: 300 CAPSULE ORAL at 07:06

## 2022-06-20 RX ADMIN — HYDROMORPHONE HYDROCHLORIDE 0.4 MG: 2 INJECTION INTRAMUSCULAR; INTRAVENOUS; SUBCUTANEOUS at 09:09

## 2022-06-20 RX ADMIN — Medication 10 MG: at 08:33

## 2022-06-20 RX ADMIN — SODIUM CHLORIDE, PRESERVATIVE FREE 10 ML: 5 INJECTION INTRAVENOUS at 23:50

## 2022-06-20 RX ADMIN — AMITRIPTYLINE HYDROCHLORIDE 75 MG: 50 TABLET, FILM COATED ORAL at 23:44

## 2022-06-20 RX ADMIN — HYDROMORPHONE HYDROCHLORIDE 2 MG: 2 TABLET ORAL at 23:56

## 2022-06-20 RX ADMIN — LIDOCAINE HYDROCHLORIDE 60 MG: 20 INJECTION, SOLUTION EPIDURAL; INFILTRATION; INTRACAUDAL; PERINEURAL at 07:34

## 2022-06-20 RX ADMIN — ONDANSETRON 4 MG: 2 INJECTION INTRAMUSCULAR; INTRAVENOUS at 09:34

## 2022-06-20 RX ADMIN — BUPIVACAINE HYDROCHLORIDE 20 ML: 2.5 INJECTION, SOLUTION INFILTRATION; PERINEURAL at 07:10

## 2022-06-20 RX ADMIN — PROPOFOL 150 MG: 10 INJECTION, EMULSION INTRAVENOUS at 07:34

## 2022-06-20 RX ADMIN — HYDROMORPHONE HYDROCHLORIDE 0.4 MG: 2 INJECTION INTRAMUSCULAR; INTRAVENOUS; SUBCUTANEOUS at 08:45

## 2022-06-20 RX ADMIN — MIDAZOLAM 2 MG: 1 INJECTION, SOLUTION INTRAMUSCULAR; INTRAVENOUS at 07:10

## 2022-06-20 RX ADMIN — SODIUM CHLORIDE, POTASSIUM CHLORIDE, SODIUM LACTATE AND CALCIUM CHLORIDE: 600; 310; 30; 20 INJECTION, SOLUTION INTRAVENOUS at 12:55

## 2022-06-20 RX ADMIN — HYDROMORPHONE HYDROCHLORIDE 2 MG: 2 TABLET ORAL at 18:29

## 2022-06-20 RX ADMIN — PROPOFOL 25 MCG/KG/MIN: 10 INJECTION, EMULSION INTRAVENOUS at 07:46

## 2022-06-20 RX ADMIN — SODIUM CHLORIDE, POTASSIUM CHLORIDE, SODIUM LACTATE AND CALCIUM CHLORIDE: 600; 310; 30; 20 INJECTION, SOLUTION INTRAVENOUS at 07:30

## 2022-06-20 RX ADMIN — ACETAMINOPHEN 650 MG: 325 TABLET ORAL at 20:13

## 2022-06-20 RX ADMIN — CELECOXIB 200 MG: 200 CAPSULE ORAL at 07:06

## 2022-06-20 RX ADMIN — TRANEXAMIC ACID 1000 MG: 100 INJECTION, SOLUTION INTRAVENOUS at 07:55

## 2022-06-20 RX ADMIN — Medication 50 MCG: at 07:34

## 2022-06-20 RX ADMIN — ASPIRIN 325 MG: 325 TABLET, COATED ORAL at 12:53

## 2022-06-20 ASSESSMENT — PAIN SCALES - GENERAL
PAINLEVEL_OUTOF10: 7
PAINLEVEL_OUTOF10: 6
PAINLEVEL_OUTOF10: 7
PAINLEVEL_OUTOF10: 4
PAINLEVEL_OUTOF10: 7
PAINLEVEL_OUTOF10: 7
PAINLEVEL_OUTOF10: 9
PAINLEVEL_OUTOF10: 6
PAINLEVEL_OUTOF10: 9
PAINLEVEL_OUTOF10: 4

## 2022-06-20 ASSESSMENT — PAIN DESCRIPTION - DESCRIPTORS
DESCRIPTORS: ACHING
DESCRIPTORS: STABBING
DESCRIPTORS: ACHING

## 2022-06-20 ASSESSMENT — PAIN - FUNCTIONAL ASSESSMENT
PAIN_FUNCTIONAL_ASSESSMENT: ACTIVITIES ARE NOT PREVENTED
PAIN_FUNCTIONAL_ASSESSMENT: ACTIVITIES ARE NOT PREVENTED
PAIN_FUNCTIONAL_ASSESSMENT: PREVENTS OR INTERFERES SOME ACTIVE ACTIVITIES AND ADLS
PAIN_FUNCTIONAL_ASSESSMENT: ACTIVITIES ARE NOT PREVENTED
PAIN_FUNCTIONAL_ASSESSMENT: PREVENTS OR INTERFERES SOME ACTIVE ACTIVITIES AND ADLS
PAIN_FUNCTIONAL_ASSESSMENT: 0-10
PAIN_FUNCTIONAL_ASSESSMENT: ACTIVITIES ARE NOT PREVENTED
PAIN_FUNCTIONAL_ASSESSMENT: ACTIVITIES ARE NOT PREVENTED

## 2022-06-20 ASSESSMENT — PAIN DESCRIPTION - ONSET
ONSET: ON-GOING

## 2022-06-20 ASSESSMENT — LIFESTYLE VARIABLES: HOW OFTEN DO YOU HAVE A DRINK CONTAINING ALCOHOL: NEVER

## 2022-06-20 ASSESSMENT — PAIN DESCRIPTION - ORIENTATION
ORIENTATION: LEFT

## 2022-06-20 ASSESSMENT — PAIN DESCRIPTION - FREQUENCY
FREQUENCY: CONTINUOUS

## 2022-06-20 ASSESSMENT — PAIN DESCRIPTION - LOCATION
LOCATION: KNEE
LOCATION: HIP
LOCATION: KNEE
LOCATION: KNEE

## 2022-06-20 ASSESSMENT — PAIN DESCRIPTION - PAIN TYPE
TYPE: SURGICAL PAIN

## 2022-06-20 NOTE — PROGRESS NOTES
Occupational Therapy  Facility/Department: [facility] MED SURG  Occupational Therapy Initial Assessment    Name: Emmanuelle Mayorga  : 1955  MRN: [id number]  Date of Service: 2022    RN reports patient is medically stable for therapy treatment this date. Chart reviewed prior to treatment and patient is agreeable for therapy. All lines intact and patient positioned comfortably at end of treatment. All patient needs addressed prior to ending therapy session. PER H&P: Patient is here today for surgery clearance for a left total knee replacement with Dr. Alena Mayberry on 2022. Patient had PFT in office. FEV1 is 1.89 L, 79% of predicted. DLCO is 104% of predicted. There is no 12% change post albuterol treatment. Patient states she has never been diagnosed with any asthma COPD or emphysema or any other respiratory related diseases. Patient states that she noticed she has a hard time with her breathing when she does not take her Lasix. Patient states that she is post be taking her Lasix every other day and that she did not take it today and that is why she believes her numbers are a little bit low on her PFT. Chest x-ray was done on 2022. Chest x-ray showed no acute cardiopulmonary disease process. Patient follows with Dr. Callie Balderrama for her SAE. Patient is currently on CPAP of 10 cm H2O and is compliant. The patient reports that she is feeling well today. She reports never being a smoker. She denies any fever, chills, chest pain, shortness of breath, phlegm, cough or hemoptysis. Encouraged patient to take her CPAP into the hospital with her and to continue to wear it every night. Patient also states that she used to have a lung mass that they watched for little bit to time. Patient states that they did not end up having to do anything with this lung mass. Lung mass is not seen on chest x-ray on 2022.     OT Equipment Recommendations  Equipment Needed:  (TBA)     Patient Diagnosis(es): There were no encounter diagnoses. Past Medical History:  has a past medical history of Arthritis, CAD (coronary artery disease), CHF (congestive heart failure) ([facility] [address] CPAP (continuous positive airway pressure) dependence, DJD (degenerative joint disease), Fibromyalgia, GERD (gastroesophageal reflux disease), Hyperlipidemia, Hypertension, Mitral valve regurgitation, Seasonal allergies, Sleep apnea, Spinal stenosis, and Thyroid disease. Past Surgical History:  has a past surgical history that includes Hysterectomy; Ankle surgery (Left); lymphadenectomy (Right); Pain management procedure; Breast biopsy (Right); Knee arthroscopy (Left); Dental surgery; and Total knee arthroplasty (Left, 6/20/2022). Assessment    Pt with deficits of strength, bed mobility, transfers,  balance, safety awareness and endurance this session,  & required 2 assist for SAFE functional mobility, & transfers, not able to tolerate ambulation this session. With current deficits, Pt HIGH risk for falls & requires continued OT to maximize independence with functional mobility, balance, safety awareness & activity tolerance. Performance deficits / Impairments: Decreased functional mobility ; Decreased ADL status; Decreased ROM; Decreased strength;Decreased safe awareness;Decreased balance;Decreased endurance  Prognosis: Good  Decision Making: Medium Complexity  REQUIRES OT FOLLOW-UP: Yes  Activity Tolerance  Activity Tolerance: Patient Tolerated treatment well;Treatment limited secondary to medical complications (free text)  Activity Tolerance Comments: Pt tolerated ~2 mins of standing but unable to safely take steps away from bed d/t diminished sensation in LLE.         Plan   Plan  Times per Week: 4-5x a week, 1-2x a day  Current Treatment Recommendations: Strengthening,Balance training,ROM,Functional mobility training,Endurance training,Positioning,Equipment evaluation, education, & procurement,Patient/Caregiver education & training,Safety education & training,Self-Care / ADL     Restrictions  Restrictions/Precautions  Restrictions/Precautions: General Precautions,Fall Risk,Up as Tolerated,Weight Bearing,Surgical protocol,Bed Alarm  Required Braces or Orthoses?: No  Lower Extremity Weight Bearing Restrictions  Left Lower Extremity Weight Bearing: Weight Bearing As Tolerated  Position Activity Restriction  Other position/activity restrictions: L UE IV;  foot drop L and knee buckling from nerve block day of surgery- walking not attempted    Subjective   General  Chart Reviewed: Yes  Patient assessed for rehabilitation services?: Yes  Family / Caregiver Present: Yes ()  Pain: Identifies some pain with movement     Social/Functional History  Social/Functional History  Lives With: Spouse (7 family members living at home)  Type of Home: House  Home Layout: Multi-level (5 levels; no bathroom on main floor; full flight up to bathroom/bedroom.)  Home Access: Stairs to enter with rails  Entrance Stairs - Number of Steps: 3 or 4  Entrance Stairs - Rails: Right  Bathroom Shower/Tub: Walk-in shower,Shower chair without back  Bathroom Toilet: Handicap height  Bathroom Equipment:  (leans on half wall by toilet)  Home Equipment: [employer], rolling,Reacher,Long-handled shoehorn,Sock aid,Cane  Has the patient had two or more falls in the past year or any fall with injury in the past year?: Yes (Leslie Neri 3 times in Dec d/t L knee giving out)  Receives Help From: Family  ADL Assistance: Independent (Used cane before surgery)  Homemaking Assistance: Needs assistance (Needs asssit with cleaning bathroom floor; limited d/t chronic back pain; doesn't go grocery shopping often (uses cane or scooter when she does); daughter in law mostly cooks)  Homemaking Responsibilities: Yes  Ambulation Assistance: Independent  Transfer Assistance: Independent  Active : Yes  Occupation: Part time employment  Type of Occupation:   Leisure & Hobbies: Key board, singing, computer games       Objective   Vision Exceptions: Wears glasses for reading  Hearing: Within functional limits       Observation/Palpation  Posture: Good  Observation: LUE IV; LLE ace wrap, kathleen  Safety Devices  Type of Devices: Gait belt; All fall risk precautions in place;Call light within reach; Patient at risk for falls; Left in bed;Nurse notified; Bed alarm in place  Restraints  Restraints Initially in Place: No     Bed Mobility Training  Bed Mobility Training: Yes  Balance  Sitting: Intact  Standing: With support  Transfer Training  Transfer Training: Yes    Gait  Overall Level of Assistance:  (Pt trialed weight shifting through BLEs in standing with RW. Pt not safe to take steps this date d/t L foot drop and L knee buckling from diminished sensation. [place] [employer] needed for safe ADL transfers/functional mob this date.)     AROM: Within functional limits  Strength: Within functional limits  Coordination: Within functional limits  Tone: Normal  Sensation: Impaired (LLE feels asleep currently)     ADL  Feeding: Independent;Setup  Grooming: Stand by assistance;Setup  UE Bathing: Stand by assistance;Setup  LE Bathing: Moderate assistance;Minimal assistance  UE Dressing: Stand by assistance;Setup  LE Dressing: Moderate assistance;Minimal assistance  Toileting: Minimal assistance; Moderate assistance  Additional Comments: Pt requires MIN/MOD x2 with RW for standing portions of ADLs this date d/t diminsihed sensation and L knee buckling. Pt not safe to take steps away from bed this date. Activity Tolerance  Activity Tolerance: Treatment limited secondary to medical complications  Activity Tolerance Comments: numb LLE from nerve block  Bed mobility  Supine to Sit: Stand by assistance  Sit to Supine: Stand by assistance  Scooting: Stand by assistance  Bed Mobility Comments: MIN VCs for pacing and assist/mgmt of lines throughout.      Transfers  Sit to stand: 2 Person assistance;Minimal assistance  Stand to sit: 2 Person use. Pt verbalize and demo good understanding. LUE AROM (degrees)  LUE AROM : WFL  RUE AROM (degrees)  RUE AROM : [facility]       AM-PAC Score        AM-PAC Inpatient Daily Activity Raw Score: 20 (06/20/22 1528)  AM-PAC Inpatient ADL T-Scale Score : 42.03 (06/20/22 1528)  ADL Inpatient CMS 0-100% Score: 38.32 (06/20/22 1528)  ADL Inpatient CMS G-Code Modifier : CJ (06/20/22 1528)    Goals  Short Term Goals  Time Frame for Short term goals: By discharge, pt to  Short Term Goal 1: tolerate reassessment of functional mob, as appropriate, with good safety, pacing and use of AD as needed. Short Term Goal 2: demo CGA with ADL transfers with good safety, pacing and use of AD/DME as needed. Short Term Goal 3: demo SBA with toileting tasks with good safety, pacing and use of AD/DME as needed. Short Term Goal 4: demo I with bed mob tasks with use of bed rails as needed. Short Term Goal 5: demo I with UB ADLs and CGA with LB ADLs with good safety, pacing and use of AD/AE as needed. Long Term Goals  Long Term Goal 1: demo and verb good understanding of edu with ADL compensatory techs, possible equip needs, RW safety/mgmt, infection protection techs, use of B ray hose, EC/WS techs, breathing techs, edema mgmt techs, and fall prevention techs. Patient Goals   Patient goals : to go home       Therapy Time   Individual Concurrent Group Co-treatment   Time In 7011         Time Out 1448         Minutes 45          Treatment mins: 35 + 8 additional min, 43 total treatment min       Eli Ryder OT     Pt seen for additional time [id number] to determine if sensation has returned enough to walk; pt still reporting she cannot feel bottom of foot. Diminished sensation to light touch and pt is unable to actively dorsiflex L foot. Pt not appropriate to trial ambulation and too profoundly numb to trial immobilizer.  Will reassess in am.     MOLLY Garay/CHERY

## 2022-06-20 NOTE — PROGRESS NOTES
Pt admitted to room 2014 per bed in stable condition from PACU  Oriented to room and surroundings  Bed in lowest position, wheels locked, 2/4 side rails up  Call light in reach, room free of clutter, adequate lighting provided  Denies any further questions at this time  Instructed to call out with any questions/concerns/new onset of pain and/or n/v   White board updated  Continue to monitor with hourly rounding  STAY WITH ME protocol initiated   Bed alarm on/Fall Risk signs in place/Fall risk sticker to wrist band  Non-skid socks on/at bedside  MEDICATION EDUCATION SHEET in place for patient/family to view & ask questions.

## 2022-06-20 NOTE — H&P
Interval H&P Note    Pt Name: Espinoza De La Cruz  MRN: [[id number]]  YOB: 1955  Date of evaluation: 6/20/2022      [x] I have reviewed in epic the pulmonary progress note by Cliff LIVINGSTON dated 6/13/2022 for an Interval History and Physical note. [x] I have examined  Espinoza De La Cruz  There are no changes to the patient who is scheduled for LEFT KNEE TOTAL ARTHROPLASTY - CONFORMIS PS, CEMENTED METHACRYLATE TO OSTEOPENIC BONE by Yaa Mccarty MD for DX LEFT KNEE OA. Patient was evaluated by pulmonary (see below) and primary care Dr. Wendy Amador on 5/31/2022 for surgical clearance. Patient follows with cardiologist Dr. Corine Santillan and received surgical clearance on 5/13/2022. She additionally received clearance from rheumatologist Dr. Edna Hunt (5/3/2022) and pain management Dr. Rosemarie Riley (5/2/2022). Patient has shortness of breath with exertion and occasional palpitations with dizziness - patient states this is unchanged. The patient denies new health changes, fever, chills, wheezing, cough, increased SOB, chest pain, open sores or wounds. Denies hx diabetes. Last CoQ10 6/19/2022, Multivitamin 6/19/2022, Omega3 6/6/2022. Vital signs: BP (!) 143/73   Pulse 84   Temp 97.5 °F (36.4 °C) (Bladder)   Resp 16   Ht 5' 5\" (1.651 m)   Wt 231 lb (104.8 kg)   SpO2 97%   BMI 38.44 kg/m²     Allergies:  Amoxicillin    Medications:    Prior to Admission medications    Medication Sig Start Date End Date Taking?  Authorizing Provider   amitriptyline (ELAVIL) 75 MG tablet 1 tablet at bedtime    Historical Provider, MD   tiZANidine (ZANAFLEX) 4 MG tablet tizanidine 4 mg tablet    Historical Provider, MD   fish oil-omega-3 fatty acids 1000 MG capsule Take 2 g by mouth 2 times daily    Historical Provider, MD   rizatriptan (MAXALT-MLT) 5 MG disintegrating tablet 1 tablet    Historical Provider, MD   coenzyme Q10 200 MG CAPS capsule Take 1 capsule by mouth daily 6/6/20   Historical Provider, MD   diclofenac (VOLTAREN) 75 MG EC tablet Take 75 mg by mouth 2 times daily   Patient not taking: Reported on 6/20/2022 4/23/17   Historical Provider, MD   dilTIAZem [place]) 240 MG extended release capsule Take 240 mg by mouth daily 8/2/21   Historical Provider, MD   furosemide (LASIX) 20 MG tablet Take 20 mg by mouth every 48 hours 12/3/21   Historical Provider, MD   liothyronine (CYTOMEL) 5 MCG tablet Take 5 mcg by mouth daily  4/24/13   Historical Provider, MD   losartan (COZAAR) 100 MG tablet Take 1 tablet by mouth daily 12/13/21   Historical Provider, MD   metoprolol succinate (TOPROL XL) 200 MG extended release tablet Take 200 mg by mouth daily  5/18/21   Historical Provider, MD   oxyCODONE-acetaminophen (PERCOCET) 5-325 MG per tablet Take 1 tablet by mouth every 4 hours as needed. 12/3/18   Historical Provider, MD   potassium chloride (KLOR-CON) 10 MEQ extended release tablet Take 10 mEq by mouth daily     Historical Provider, MD   omeprazole (PRILOSEC) 40 MG delayed release capsule Take 40 mg by mouth daily  4/24/13   Historical Provider, MD   rizatriptan (MAXALT) 5 MG tablet Take 5 mg by mouth once as needed  5/29/20   Historical Provider, MD   simvastatin (ZOCOR) 10 MG tablet Take 10 mg by mouth nightly  5/17/21   Historical Provider, MD   levothyroxine (SYNTHROID) 88 MCG tablet Take 88 mcg by mouth Daily  4/24/13   Historical Provider, MD   zolpidem (AMBIEN CR) 6.25 MG extended release tablet Take 1 tablet by mouth daily. 12/13/21   Historical Provider, MD   Multiple Vitamins-Minerals (MULTIVITAMIN ADULTS PO) Take 1 tablet by mouth daily    Historical Provider, MD   [place]-3 1000 MG CAPS Take 2,000 mg by mouth daily    Historical Provider, MD   denosumab (PROLIA) 60 MG/ML SOSY SC injection Inject 60 mg into the skin once January and July    Historical Provider, MD         This is a 79 y.o. female who is pleasant, cooperative, alert and oriented x3, in no acute distress. Obese.      Heart: Heart sounds are normal.  HR 84 regular rate and rhythm with grade II-III/VI systolic murmur without gallop or rub. Lungs: Normal respiratory effort with equal expansion, good air exchange, unlabored and clear to auscultation without wheezes or rales bilaterally   Abdomen: soft, nontender, nondistended with bowel sounds active. Labs:  Recent Labs     05/26/22  0900   HGB 11.8*   HCT 36.7   WBC 8.4   MCV 98.9         K 4.0      CO2 27   BUN 16   CREATININE 1.00*   GLUCOSE 126*   INR 0.9   PROTIME 12.2   APTT 27.5   AST 19   ALT 12   LABALBU 4.5       No results for input(s): COVID19 in the last 720 hours. KERRIE King CNP  Electronically signed 6/20/2022 at 6:55 AM    YARA Calzada - 06/13/2022 3:30 PM EDT  Formatting of this note is different from the original.  Images from the original note were not included. Chief Complaint: Colletta Flavors is a 79 y.o. female present for initial visit regarding surgery clearance    HPI:   Patient is accompanied by: Self, walked in with a cane and knee brace on left knee    Patient is here today for surgery clearance for a left total knee replacement with Dr. Harper Dao on 06/20/2022. Patient had PFT in office. FEV1 is 1.89 L, 79% of predicted. DLCO is 104% of predicted. There is no 12% change post albuterol treatment. Patient states she has never been diagnosed with any asthma COPD or emphysema or any other respiratory related diseases. Patient states that she noticed she has a hard time with her breathing when she does not take her Lasix. Patient states that she is post be taking her Lasix every other day and that she did not take it today and that is why she believes her numbers are a little bit low on her PFT. Chest x-ray was done on 06/03/2022. Chest x-ray showed no acute cardiopulmonary disease process. Patient follows with Dr. Troy Us for her SAE. Patient is currently on CPAP of 10 cm H2O and is compliant. The patient reports that she is feeling well today.  She reports never being a smoker. She denies any fever, chills, chest pain, shortness of breath, phlegm, cough or hemoptysis. Encouraged patient to take her CPAP into the hospital with her and to continue to wear it every night. Patient also states that she used to have a lung mass that they watched for little bit to time. Patient states that they did not end up having to do anything with this lung mass. Lung mass is not seen on chest x-ray on 06/03/2022. Frequency of respiratory infections: Rare    Has tried following inhalers in past: none    Last steroid use: none oral in the last year    Previous Exposures: There is no history of TB or TB exposure. There is no asbestos or silica dust exposure. The patient reports does not have any coal, foundry, quarry or [employer] exposure. Travel history reveals no significant history of risk factors for pulmonary disease. There is no history of recreational or IV drug use. The patient does not have any pets: dogs, cats, turtles or exotic birds.      EPWORTH SLEEPINESS SCALE     Sitting and Reading: Never   Watching TV: Never  Sitting inactive in a public place (theater, meeting): Never  As a passenger in a car for an hour without a break: Never  Lying down in the afternoon to rest: Never  Sitting and talking to someone: Never  Sitting quietly after lunch (without alcohol): Never  In a car, while stopped for a few minutes in traffic: Never  Total: 0    OARRS REVIEWED:     Reviewed: no    PMH     PERTINENT HISTORY:     Her pertinent medical history includes   Past Medical History:   Diagnosis Date    Acute lateral meniscus tear of right knee    Arthritis of knee, right    Arthritis of lumbar spine   Usama Munoz    Bursitis of both hips    Costochondritis- left side       Degeneration of intervertebral disc of lumbar region       Degenerative joint disease of ankle and foot, left    Fibromyalgia       Fibromyalgia, primary    Hashimoto's disease    Hiatal hernia with GERD    Hyperlipidemia    Hypothyroidism    Mitral valve regurgitation       Obesity    Osteoarthritis of ankle and foot, left   [facility]    Osteoporosis   /    Sleep apnea    Tear of meniscus of right knee    Tendonitis of knee, right          CURRENT MEDICATIONS:     Reviewed with patient. Current Outpatient Medications:    AMBULATORY COMPOUNDED MEDICATION, Apply 0.25 mL topically daily. Apply 1/4 dose to vaginal labial mucosa in AM 6 out of 7 days. Quantity: 7 Refills: 0, Disp: 21 mL, Rfl: 5   amitriptyline (ELAVIL) 50 mg tablet, , Disp: , Rfl:    diclofenac (VOLTAREN) 75 mg EC tablet, , Disp: , Rfl:    dilTIAZem CD (CARDIZEM CD) 240 mg 24 hr capsule, Take 240 mg by mouth daily. , Disp: , Rfl:    furosemide (LASIX) 20 mg tablet, Take 25 mg by mouth daily. , Disp: , Rfl:    levothyroxine (SYNTHROID, LEVOTHROID) 88 MCG tablet, Take 1 tablet by mouth daily. , Disp: 90 tablet, Rfl: 1   lidocaine (LIDODERM) 5 %, Place 1 patch on the skin daily. Patch may remain in place for up to 12 hours in a 24-hour period Remove previous patch before applying new, Disp: 30 patch, Rfl: 0   liothyronine (CYTOMEL) 5 MCG tablet, TAKE ONE TABLET BY MOUTH DAILY EVERY OTHER DAY (ALTERNATE WITH TAKING TWO TABLETS), Disp: 30 tablet, Rfl: 5   multivitamin (THERAGRAN) tablet, Take 1 tablet by mouth., Disp: , Rfl:    omeprazole (PriLOSEC) 40 mg capsule, TAKE ONE CAPSULE BY MOUTH DAILY, Disp: 90 capsule, Rfl: 1   oxyCODONE-acetaminophen (PERCOCET) 5-325 mg per tablet, , Disp: , Rfl:    rizatriptan (MAXALT) 5 mg tablet, TAKE ONE TABLET BY MOUTH AT ONSET OF HEADACHE; MAY REPEAT ONE TABLET IN 2 HOURS IF NEEDED.  (MAX OF 30MG IN 24 HOURS), Disp: 6 tablet, Rfl: 5   simvastatin (ZOCOR) 10 mg tablet, TAKE ONE TABLET BY MOUTH DAILY, Disp: 30 tablet, Rfl: 5   tiZANidine (ZANAFLEX) 4 mg tablet, Take 4 mg by mouth nightly., Disp: , Rfl:    ubidecarenone (COENZYME Q10) 100 mg tablet, Take 200 mg by mouth daily. , Disp: , Rfl:    zolpidem CR (AMBIEN CR) 6.25 mg CR tablet, Take 1 tablet (6.25 mg total) by mouth nightly as needed for sleep., Disp: 30 tablet, Rfl: 2     Vitals:   06/13/22 1409   BP: 146/81   Pulse: 91   Temp: 36.8 °C (98.2 °F)   SpO2: 96%   Weight: 103 kg (227 lb)   Height: 165.1 cm (5' 5\")     ALLERGIES     Reviewed with patient. Amoxicillin     FAMILY HISTORY     Family History   Problem Relation Age of Onset    Sleep apnea Father   PATIENT'S FATHER HAD A CPAP MACHINE    Sleep apnea Son 30   WEARS A CPAP MACHINE       SOCIAL HISTORY:       Social History     Socioeconomic History    Marital status:    Spouse name: Not on file    Number of children: Not on file    Years of education: Not on file    Highest education level: 12th grade   Occupational History    Not on file   Tobacco Use    Smoking status: Never Smoker    Smokeless tobacco: Never Used   Substance and Sexual Activity    Alcohol use: Yes    Drug use: No   Comment: 2-3 CANS OF DIET PEPSI A DAY    Sexual activity: Defer   Other Topics Concern    Not on file   Social History Narrative    Not on file     Social Determinants of Health     Financial Resource Strain: Low Risk    Difficulty of Paying Living Expenses: Not hard at all   Transportation Needs: No Transportation Needs    Lack of Transportation (Medical): No    Lack of Transportation (Non-Medical): No   Physical Activity: Insufficiently Active    Days of Exercise per Week: 2 days    Minutes of Exercise per Session: 20 min   Stress: No Stress Concern Present    Feeling of Stress : Not at all   Social Connections: Socially Integrated    Frequency of Communication with Friends and Family:  Three times a week    Frequency of Social Gatherings with Friends and Family: More than three times a week    Attends [employer] Services: More than 4 times per year    Active Member of [address] [place] [address] [employer] or Organizations: Yes    Attends Club or Organization Meetings: More than 4 times per year    Marital Status:    Intimate Partner Violence: Not At Risk    Fear of Current or Ex-Partner: No    Emotionally Abused: No    Physically Abused: No    Sexually Abused: No       TRAVEL HISTORY:     Denies recent travel. ROS:     Review of Systems   Constitutional: Negative for fever and chills. HENT: Negative for postnasal drip and rhinorrhea. Respiratory: Negative for cough and shortness of breath. Cardiovascular: Negative for chest pain and palpitations. PHYSICAL EXAM     Vital signs /81  Pulse 91  Temp 36.8 °C (98.2 °F)  Ht 165.1 cm (5' 5\")  Wt 103 kg (227 lb)  SpO2 96%  BMI 37.77 kg/m²   Physical Exam  Vitals and nursing note reviewed. Constitutional:   Appearance: Normal appearance. She is obese. HENT:   Mouth/Throat:   Mouth: Mucous membranes are moist.   Cardiovascular:   Rate and Rhythm: Normal rate and regular rhythm. Pulses: Normal pulses. Heart sounds: Normal heart sounds. Pulmonary:   Effort: Pulmonary effort is normal.   Breath sounds: Normal breath sounds. Skin:  General: Skin is warm and dry. Capillary Refill: Capillary refill takes less than 2 seconds. Neurological:   General: No focal deficit present. Mental Status: She is alert and oriented to person, place, and time. Psychiatric:   Mood and Affect: Mood normal.   Behavior: Behavior normal.         LAB RESULTS:     Lab Results   Component Value Date   CO2 28 01/17/2022     Lab Results   Component Value Date   TSH 1.23 02/22/2021   T3, free 3.40 02/22/2021       IMAGING/PFT   EDUCATION:    X-ray chest 2 views    Result Date: 6/3/2022  PA and lateral chest: HISTORY: Preoperative exam. Anesthesia clearance. 2 views of the chest are obtained. Cardiac and mediastinal contours are unremarkable. There is no focal infiltrate or effusion. No pneumothorax identified. Osseous structures appear intact.  IMPRESSION: No acute findings. [id number] Finalized by Clive Chou MD on 6/3/2022 5:15 PM    X-ray chest 2 views  PA and lateral chest:    HISTORY:    Preoperative exam. Anesthesia clearance. 2 views of the chest are obtained. Cardiac and mediastinal contours are unremarkable. There is no focal infiltrate or effusion. No pneumothorax identified. Osseous structures appear intact. IMPRESSION:    No acute findings. [id number]    Finalized by Clive Chou MD on 6/3/2022 5:15 PM    CXR 06/03/2022    PFT 06/13/2022    DA    T  Pulmonary Work-up:     PFTs - 06/13/2022  Imaging - 6/03/2022  ECHO - 10/28/2021  Pulmonary rehab - not applicable  Alpha 1 testing - not applicable  RAST- not applicable  Immunoglobulins-not applicable  Oxygen - room air 96%  Medication regime - none  Smoking cessation - lifelong nonsmoker  Immunizations - Pneumococcal, Influenza, Pertussis (once as adult)  Follows with Allergy/Immunology- no  GERD- Prilosec    IMPRESSION     Diagnoses and all orders for this visit:    Pre-operative clearance  - Pulmonary function test Spirometry (Flow Volume Loop) pre/post short acting bronchodilator w/ DLCO (diffusion study)    SAE on CPAP    BMI 37.0-37.9, adult    1. Preop clearance  2. SAE on PAP  3. Migraines  4. Mitral valve insufficiency  5. History of lung mass  6. GERD  Body mass index is 37.77 kg/m². 7. Hiatal hernia  8. Lifelong nonsmoker  9. History of partial mastectomy/lumpectomy with axillary lymphadenectomy, non cancereous    PLAN     Discussed diagnosis, its evaluation, treatment and usual course. All questions answered. Educational material distributed. No orders of the defined types were placed in this encounter. No orders of the defined types were placed in this encounter. 1. Continue CPAP 10 cm H2O on a nightly basis. New PAP mask supplies as needed  2. Reviewed PFT and chest x-ray in clinic with patient  3. Received COVID vaccine  4.  Diet and exercise were discussed in detail. 5. Any age appropriate or routine screening per PCP. 6. Follow up in 9 Months time with Dr. Ramón Vital. If her condition should change prior to this she is encouraged to give our office a call. Per Ariscat scoring patient is cleared with a 1.6% (low) risk of in-hospital post-op pulmonary complications (composite including respiratory failure, respiratory infection, pleural effusion, atelectasis, pneumothorax, bronchospasm, aspiration pneumonitis). CC: Deannie Lesch, MD Adah Dust, APRN-CNP  [facility]   Office: [phone]  3:17 PM on 6/13/2022    This note is dictated with the use of M*Modal.Please note that this dictation was completed with computer voice recognition software. Quite often unanticipated grammatical, syntax, homophones, and other interpretive errors are inadvertently transcribed by the computer software. Please disregard these errors. Please excuse any errors that have escaped final proofreading.    Electronically signed by YARA Rogers at 06/13/2022 3:18 PM EDT

## 2022-06-20 NOTE — OP NOTE
Operative Note      Patient: Blayne Blue  YOB: 1955  MRN: [id number]    Date of Procedure: 6/20/2022    Pre-Op Diagnosis: LEFT KNEE OA    Post-Op Diagnosis: Same       Procedure(s):  LEFT KNEE TOTAL ARTHROPLASTY - Yoseph Aguillon    Surgeon(s):  Odette Yu MD    Assistant:   Surgical Assistant: Soto Ryan  Resident: Elise Olivia DO    Anesthesia: General    Estimated Blood Loss (mL): less than 591     Complications: None    Specimens:   * No specimens in log *    Implants:  Implant Name Type Inv. Item Serial No.  Lot No. LRB No. Used Action   CEMENT BNE 40GM FULL DOSE PMMA W/O ANTIBIO HI VISC N RADPQ - [id number]  CEMENT BNE 40GM FULL DOSE PMMA W/O ANTIBIO HI VISC N RADPQ  [employer]- [id number] Left 2 Implanted   ITOTAL IDENTITY PS FEMORAL IMPLANT TIBIAL TRAY 3 SETS POLY INSERTS   [id number]   Left 1 Implanted   ITOTAL IDENTITY PS FEMORAL IMPLANT LEFT   [id number]   Left 1 Implanted   ITOTAL IDENTITY PS TIBIAL T RAY LEFT   [id number]   Left 1 Implanted   IMPLANT PAT 35X7MM CR IPOLY ITOTAL - [id number]  IMPLANT PAT 35X7MM CR IPOLY ITOTAL  [employer]WD [id number] Left 1 Implanted   ITOTAL IDENTITY PS TIBIAL INSERT IPOLY 6MM LEFT   [id number]   Left 1 Implanted         Drains: * No LDAs found *    Findings: Severe OA left knee with moderate genu varum      Detailed Description of Procedure: Indications  The patient has had severe pain and arthritis of the left knee, unresponsive to conservative treatment. It is now recommended that the patient have total knee replacement. After discussion of the particulars of surgery, risks and benefits as well as alternative treatments, I believe that all of their questions were answered to their satisfaction. Informed consent is now given to proceed with surgery as discussed.     Procedure  After proper patient identification and marking of the surgical site in the preoperative area, and following update of the history and physical examination, the patient was brought to the operative suite and placed on the table in supine position. The patient was identified as Herve Toth. An adequate anesthetic was induced. A tourniquet was applied to the thigh and then the lower extremity was prepped and draped free in the usual sterile fashion. After marking the skin, the knife was taken and the skin was incised in the anterior midline at the knee, deepening the incision through subcutaneous tissue to deep retinaculum. Minimal flap development was undertaken. A medial capsulotomy was made, not violating the quadriceps tendon, and the patella was tipped laterally to inspect the joint surfaces. At this time, meticulous hemostasis was undertaken. There was advanced tricompartmental degenerative change, warranting knee replacement as planned. Therefore, attention was first directed to the patella. The patellar articular surface was removed with a saw, and the patella was then sized for resurfacing and subluxated laterally. Attention was directed to the femur. The femoral cutting guide was used to resect the femur at the templated valgus angle and then the femoral guides were used to indicate the patient-specific size. Next the remaining anterior and posterior as well as chamfer and femoral notch cuts were made. The trial femoral component was then fitted and noted to seat adequately. Now attention was directed to the tibia. The tibial cutting guide was used to resect the tibial plateau at the templated posterior slope, a few millimeters below the major tibial defect. The tibial sizers were used to indicate the patient-specific size. Now, with these trial femoral and tibial components in place, the knee was articulated and moved through a full range of motion using the size 35 mm domed patellar button trial and the 6 mm PS trial tibial polyethylene spacer.  Excellent stability and restoration of anatomic alignment as well as satisfactory patellar tracking were noted with this arrangement of trial components. The limb was then elevated and exsanguinated, and the tourniquet was inflated to 300 mmHg. Next the trial components were removed and the cut surfaces of bone were cleaned and dried while methylmethacrylate cement was prepared. The actual components selected for implantation were identical to the trial sizes used, and these were inserted and cemented in place in sequential order. After curing of the cement, the tourniquet was deflated after 18 minutes of total tourniquet time. Bleeding points were controlled by electrocautery, and the wound was then copiously irrigated and closed in a layerwise fashion by the first assistant, Trell Rodriguez DO, without the use of a drain. Sponge and needle counts were correct per the circulating nurse. Sterile dressings were applied and anesthesia was reversed. The patient was then able to be transported to the recovery room in good condition, having tolerated the procedure without complications and with approximately 100 mL of blood loss. The particulars of surgery as well as the condition of the patient postoperatively were discussed with the patients family thereafter.     Electronically signed by Noble Prader, MD on 6/20/2022 at 10:15 AM

## 2022-06-20 NOTE — PROGRESS NOTES
Bedside single shot nerve block done by Dr. Tawny Costello at bedside. Pt monitored per protocol and tolerated procedure well. Bedrails up x 2 and pt instructed to not get OOB . LLE elevated and supported for comfort and safety.  Monitoring closely

## 2022-06-20 NOTE — DISCHARGE INSTR - COC
Continuity of Care Form    Patient Name: Nina Hendrickson   :  1955  MRN:  [id number]    [address] [place] [address] date:  2022  Discharge date:  ***    Code Status Order: No Order   Advance Directives:   Advance Care Flowsheet Documentation       Date/Time Healthcare Directive Type of Healthcare Directive Copy in [address] Agent's Name Healthcare Agent's Phone Number    [phone] No, patient does not have an advance directive for healthcare treatment -- -- -- -- --            Admitting Physician:  Cherelle Ford MD  PCP: Rivas Coulter MD    Discharging Nurse: [facility] [address]  Discharging Unit Phone Number: ***    Emergency Contact:   Extended Emergency Contact Information  Primary Emergency Contact: Lisa Benitez, [facility] Phone: [phone]  Relation: Spouse    Past Surgical History:  Past Surgical History:   Procedure Laterality Date    ANKLE SURGERY Left     x3    BREAST BIOPSY Right     DENTAL SURGERY      pt has permanant implanted dentures    HYSTERECTOMY (CERVIX STATUS UNKNOWN)      KNEE ARTHROSCOPY Left     LYMPHADENECTOMY Right     axilla, not cancer    PAIN MANAGEMENT PROCEDURE      back ablations, last        Immunization History:   Immunization History   Administered Date(s) Administered    COVID-19, J&J, PF, 0.5 mL 2021       Active Problems: There is no problem list on file for this patient.       Isolation/Infection:   Isolation            No Isolation          Patient Infection Status       None to display            Nurse Assessment:  Last Vital Signs: BP (!) 121/42   Pulse 81   Temp 97.5 °F (36.4 °C) (Bladder)   Resp 22   Ht 5' 5\" (1.651 m)   Wt 231 lb (104.8 kg)   SpO2 100%   BMI 38.44 kg/m²     Last documented pain score (0-10 scale): Pain Level: 6  Last Weight:   Wt Readings from Last 1 Encounters:   22 231 lb (104.8 kg)     Mental Status:  {IP PT MENTAL STATUS:}    IV Access:  [facility] IV QKIJPN:967628033}    Nursing Mobility/ADLs:  Walking   {CHP DME VUHY:776710052}  Transfer  {CHP DME WXSP:783228044}  Bathing  {CHP DME RASHEL:195818269}  Dressing  {CHP DME EZTK:679976146}  Toileting  {CHP DME ZQQO:086188489}  Feeding  {CHP DME QKDN:137038860}  Med Admin  {P DME RNOL:231134107}  Med Delivery   {[facility] MED Delivery:314353915}    Wound Care Documentation and Therapy:  Incision Knee Anterior; Left (Active)   Number of days:         Elimination:  Continence: Bowel: {YES / ROSARIO:58324}  Bladder: {YES / ES:64441}  Urinary Catheter: {Urinary Catheter:301194596}   Colostomy/Ileostomy/Ileal Conduit: {YES / CS:76921}       Date of Last BM: ***    Intake/Output Summary (Last 24 hours) at 2022 1009  Last data filed at 2022 0846  Gross per 24 hour   Intake --   Output 100 ml   Net -100 ml     No intake/output data recorded.     Safety Concerns:     [address] [employer] Safety Concerns:981960584}    Impairments/Disabilities:      [address] [employer] Impairments/Disabilities:282130815}    Nutrition Therapy:  Current Nutrition Therapy:   [address] [employer] Diet List:493883087}    Routes of Feeding: {P DME Other Feedings:875158744}  Liquids: {Slp liquid thickness:71176}  Daily Fluid Restriction: {CHP DME Yes amt example:366064847}  Last Modified Barium Swallow with Video (Video Swallowing Test): {Done Not Done DUMU:446736402}    Treatments at the Time of Hospital Discharge:   Respiratory Treatments: ***  Oxygen Therapy:  {Therapy; copd oxygen:07194}  Ventilator:    { ANJELICA Vent W:681784730}    Rehab Therapies: {THERAPEUTIC INTERVENTION:9684541705}  Weight Bearing Status/Restrictions: [address] [employer] Weight Bearin}  Other Medical Equipment (for information only, NOT a DME order):  {EQUIPMENT:430477632}  Other Treatments: ***    Patient's personal belongings (please select all that are sent with patient):  {CHP DME Belongings:587174908}    RN SIGNATURE:  {Esignature:453300461}    CASE MANAGEMENT/SOCIAL WORK SECTION    Inpatient Status Date: ***    Readmission Risk Assessment Score:  Readmission Risk              Risk of Unplanned Readmission:  0           Discharging to Facility/ Agency   Name:   Address:  Phone:  Fax:    Dialysis Facility (if applicable)   Name:  Address:  Dialysis Schedule:  Phone:  Fax:    / signature: {Esignature:432636790}    PHYSICIAN SECTION    Prognosis: Good    Condition at Discharge: Stable    Rehab Potential (if transferring to Rehab): Good    Recommended Labs or Other Treatments After Discharge: Home PT only on request of the patient. please    Physician Certification: I certify the above information and transfer of Nina Hendrickson  is necessary for the continuing treatment of the diagnosis listed and that she requires Home Care for less 30 days.      Update Admission H&P: No change in H&P    PHYSICIAN SIGNATURE:  Electronically signed by Cherelle Ford MD on 6/20/22 at 10:10 AM EDT

## 2022-06-20 NOTE — CONSULTS
[facility].,    Adult Hospitalist      Name: Jenny Abel  MRN: [de-identified]     Acct: [de-identified]  Room: 2014/2014-02    Admit Date: 6/20/2022  5:32 AM  PCP: Gabriella Galloway MD    Primary Problem  Principal Problem (Resolved):    Primary osteoarthritis of left knee  Active Problems:    S/P total knee arthroplasty, left    Primary osteoarthritis of left knee        Assesment:     · Left knee osteoarthritis  · Status post left knee total arthroplasty dated 6/20/2022  · Essential hypertension  · Hypothyroidism  · Mixed hyperlipidemia  · Osteoporosis  · Gastroesophageal reflux disease without esophagitis  · Insomnia  · Obesity with BMI 38.4        Plan:     · Ad[facility]  · Monitor vitals closely  · Keep SPO2 above 90%  · I's and O's  · IV fluids  · Pain control  · Antiemetics as needed  · Resume essential home medication  · Add parameters  · Hold Lasix  · Incentive spirometry  · Labs as needed  · Discussed with nursing  · Answered all questions  · Significant other at bedside  · DVT and GI prophylaxis. Chief Complaint:     No chief complaint on file. Medical management    History of Present Illness:      Jenny Abel is a 79 y.o.  female who presents with No chief complaint on file. Patient underwent left knee total arthroplasty without incident. Patient says her pain has been in adequate control. She denies any nausea or vomiting.     Denies chest pain, dyspnea or orthopnea. Denies headache, vision change or neck pain. Denies back pain, abdominal pain, diarrhea or constipation. Denies any rash    I have personally reviewed the past medical history, past surgical history, medications, social history, and family history, and summarized in the note. Review of Systems:     All 10 point system is reviewed and negative otherwise mentioned in HPI.       Past Medical History:     Past Medical History:   Diagnosis Date    Arthritis     CAD (coronary artery disease)     CHF (congestive heart failure) ([facility] [address]     pt has not been diagnosed however on medications for her heart and a diuretic, assumes CHF    CPAP (continuous positive airway pressure) dependence     DJD (degenerative joint disease)     left ankle, left knee, rght knee    Fibromyalgia     GERD (gastroesophageal reflux disease)     Hyperlipidemia     Hypertension     Mitral valve regurgitation     Seasonal allergies     Sleep apnea     Spinal stenosis     Thyroid disease     hashimotos        Past Surgical History:     Past Surgical History:   Procedure Laterality Date    ANKLE SURGERY Left     x3    BREAST BIOPSY Right     DENTAL SURGERY      pt has permanant implanted dentures    HYSTERECTOMY (CERVIX STATUS UNKNOWN)      KNEE ARTHROSCOPY Left     LYMPHADENECTOMY Right     axilla, not cancer    PAIN MANAGEMENT PROCEDURE      back ablations, last @/2022    TOTAL KNEE ARTHROPLASTY Left 6/20/2022    LEFT KNEE TOTAL ARTHROPLASTY - CONFORMIS performed by Kari Evans MD at [facility]        Medications Prior to Admission:       Prior to Admission medications    Medication Sig Start Date End Date Taking?  Authorizing Provider   amitriptyline (ELAVIL) 75 MG tablet 1 tablet at bedtime    Historical Provider, MD   tiZANidine (ZANAFLEX) 4 MG tablet tizanidine 4 mg tablet    Historical Provider, MD   fish oil-omega-3 fatty acids 1000 MG capsule Take 2 g by mouth 2 times daily    Historical Provider, MD   coenzyme Q10 200 MG CAPS capsule Take 1 capsule by mouth daily 6/6/20   Historical Provider, MD   dilTIAZem [facility]) 240 MG extended release capsule Take 240 mg by mouth daily 8/2/21   Historical Provider, MD   furosemide (LASIX) 20 MG tablet Take 20 mg by mouth every 48 hours 12/3/21   Historical Provider, MD   liothyronine (CYTOMEL) 5 MCG tablet Take 5 mcg by mouth daily  4/24/13   Historical Provider, MD   losartan (COZAAR) 100 MG tablet Take 1 tablet by mouth daily 12/13/21   Historical Provider, MD   metoprolol succinate (TOPROL XL) 200 MG extended release tablet Take 200 mg by mouth daily  21   Historical Provider, MD   oxyCODONE-acetaminophen (PERCOCET) 5-325 MG per tablet Take 1 tablet by mouth every 4 hours as needed. 12/3/18   Historical Provider, MD   potassium chloride (KLOR-CON) 10 MEQ extended release tablet Take 10 mEq by mouth daily     Historical Provider, MD   omeprazole (PRILOSEC) 40 MG delayed release capsule Take 40 mg by mouth daily  13   Historical Provider, MD   rizatriptan (MAXALT) 5 MG tablet Take 5 mg by mouth once as needed  20   Historical Provider, MD   simvastatin (ZOCOR) 10 MG tablet Take 10 mg by mouth nightly  21   Historical Provider, MD   levothyroxine (SYNTHROID) 88 MCG tablet Take 88 mcg by mouth Daily  13   Historical Provider, MD   zolpidem (AMBIEN CR) 6.25 MG extended release tablet Take 1 tablet by mouth daily. 21   Historical Provider, MD   Multiple Vitamins-Minerals (MULTIVITAMIN ADULTS PO) Take 1 tablet by mouth daily    Historical Provider, MD   Gladys-3 1000 MG CAPS Take 2,000 mg by mouth daily    Historical Provider, MD   denosumab (PROLIA) 60 MG/ML SOSY SC injection Inject 60 mg into the skin once January and July    Historical Provider, MD        Allergies:       Amoxicillin    Social History:     Tobacco:    reports that she has never smoked. She has never used smokeless tobacco.  Alcohol:      reports no history of alcohol use. Drug Use:  reports no history of drug use. Family History:     History reviewed. No pertinent family history.       Physical Exam:     Vitals:  BP (!) 153/68   Pulse (!) 105   Temp 97.7 °F (36.5 °C) (Oral)   Resp 16   Ht 5' 5\" (1.651 m)   Wt 231 lb (104.8 kg)   SpO2 95%   BMI 38.44 kg/m²   Temp (24hrs), Av.8 °F (36.6 °C), Min:97.3 °F (36.3 °C), Max:98.2 °F (36.8 °C)          General appearance - alert, well appearing, and in no acute distress  Mental status - oriented to person, place, and time with normal affect  Head - normocephalic and atraumatic  Eyes - pupils equal and reactive, extraocular eye movements intact, conjunctiva clear  Ears - hearing appears to be intact  Nose - no drainage noted  Mouth - mucous membranes moist  Neck - supple, no carotid bruits, thyroid not palpable  Chest - clear to auscultation, normal effort  Heart - normal rate, regular rhythm, no murmur  Abdomen - soft, obese, nontender, nondistended, bowel sounds present all four quadrants, no masses, hepatomegaly or splenomegaly  Neurological - normal speech, no focal findings or movement disorder noted, cranial nerves II through XII grossly intact  Extremities - peripheral pulses palpable, no pedal edema or calf pain with palpation. Left knee dressing  Skin - no gross lesions, rashes, or induration noted        Data:     Labs:    Hematology:No results for input(s): WBC, RBC, HGB, HCT, MCV, MCH, MCHC, RDW, PLT, MPV, SEDRATE, CRP, INR, DDIMER, LX6UQXVX, LABABSO in the last 72 hours. Invalid input(s): PT  Chemistry:No results for input(s): NA, K, CL, CO2, GLUCOSE, BUN, CREATININE, MG, ANIONGAP, LABGLOM, GFRAA, CALCIUM, CAION, PHOS, PSA, PROBNP, TROPHS, CKTOTAL, CKMB, CKMBINDEX, MYOGLOBIN, DIGOXIN, LACTACIDWB in the last 72 hours. No results for input(s): PROT, LABALBU, LABA1C, V6ECNYV, Y5UGWIU, FT4, TSH, AST, ALT, LDH, GGT, ALKPHOS, LABGGT, BILITOT, BILIDIR, AMMONIA, AMYLASE, LIPASE, LACTATE, CHOL, HDL, LDLCHOLESTEROL, CHOLHDLRATIO, TRIG, VLDL, ZKY92RO, PHENYTOIN, PHENYF, URICACID, POCGLU in the last 72 hours.     Lab Results   Component Value Date    INR 0.9 05/26/2022    PROTIME 12.2 05/26/2022       Lab Results   Component Value Date/Time    SPECIAL NOT REPORTED 12/17/2014 10:46 AM    SPECIAL NOT REPORTED 12/17/2014 10:46 AM    SPECIAL NOT REPORTED 12/17/2014 10:46 AM     Lab Results   Component Value Date/Time    CULTURE NORMAL GIULIA 12/17/2014 10:46 AM    CULTURE NO ANAEROBIC ORGANISMS ISOLATED AT 5 DAYS (A) 12/17/2014 10:46 AM    CULTURE  12/17/2014 10:46 AM     [address], [address] [place] [address] [phone]    CULTURE NO GROWTH 32 DAYS 12/17/2014 10:46 AM    CULTURE  12/17/2014 10:46 AM     [address], [address] [place] [address] [phone]    CULTURE NO GROWTH 46 DAYS 12/17/2014 10:46 AM    CULTURE  12/17/2014 10:46 AM     [address], [address] [place] [address] [phone]       No results found for: POCPH, PHART, PH, POCPCO2, BWD4VPP, PCO2, POCPO2, PO2ART, PO2, POCHCO3, SXE7ZIE, HCO3, NBEA, PBEA, BEART, BE, THGBART, THB, FIV2HNQ, TCWX3QOZ, V5KZQBDL, O2SAT, FIO2    Radiology:    XR KNEE LEFT (1-2 VIEWS)    Result Date: 6/20/2022  Status post total left knee arthroplasty. All radiological studies reviewed                Code Status:  Full Code    Electronically signed by Lyssa Guadarrama MD on 6/20/2022 at 7:39 PM     Copy sent to Dr. Shaheen Hernandez MD    This note was created with the assistance of a speech-recognition program.  Although the intention is to generate a document that actually reflects the content of the visit, no guarantees can be provided that every mistake has been identified and corrected by editing. Note was updated later by me after  physical examination and  completion of the assessment.

## 2022-06-20 NOTE — PROGRESS NOTES
Calderon Escobar was evaluated today and a DME order was entered for a wheeled walker because she requires this to successfully complete daily living tasks of ambulating. A wheeled walker is necessary due to the patient's unsteady gait, upper body weakness, and inability to  an ambulation device; and she can ambulate only by pushing a walker instead of a lesser assistive device such as a cane, crutch, or standard walker. The need for this equipment was discussed with the patient and she understands and is in agreement.

## 2022-06-20 NOTE — PROGRESS NOTES
Orthopedic Coordinator Note    Patient s/p left total Knee replacement on 06/20/2022 WITH DR. Haven Heck    The following appointments are currently scheduled:    Post-op with surgeon 07/06/2022 AT 0499 52 06 34. Physical Therapy UNSURE      Wheeled walker order is  entered  Face to face documentation is IN. PT WILL NEED A WALKER. PT WILL TAKE AN OTC 325MG EC ASA BID FOR 30 DAYS FOR DVT PROPHYLAXIS. PT HAS PERCOCET AND GABAPENTIN ESCRIBE BY DR. Haven Heck TO [address].       Any questions please contact Rita Parrish RN, BSN      Electronically signed by: Rita Parrish RN on 6/20/2022 at 8:32 AM

## 2022-06-20 NOTE — ANESTHESIA POSTPROCEDURE EVALUATION
Department of Anesthesiology  Postprocedure Note    Patient: Angus Herzog  MRN: [de-identified]  YOB: 1955  Date of evaluation: 6/20/2022      Procedure Summary     Date: 06/20/22 Room / Location: [place] [address]    Anesthesia Start: 0730 Anesthesia Stop: 1027    Procedure: LEFT KNEE TOTAL ARTHROPLASTY - Gilmar Santamaria (Left Knee) Diagnosis:       Osteoarthritis of left knee, unspecified osteoarthritis type      (DX LEFT KNEE OA)    Surgeons: Alee Cortes MD Responsible Provider: Aggie Walter MD    Anesthesia Type: general ASA Status: 3          Anesthesia Type: general    Jay Phase I: Jay Score: 8    Jay Phase II:      Last vitals:   Vitals Value Taken Time   /68 06/20/22 1130   Temp 98.1 °F (36.7 °C) 06/20/22 1130   Pulse 132 06/20/22 1149   Resp 15 06/20/22 1149   SpO2 98 % 06/20/22 1149   Vitals shown include unvalidated device data.       Anesthesia Post Evaluation    Patient location during evaluation: PACU  Patient participation: complete - patient participated  Level of consciousness: awake  Airway patency: patent  Nausea & Vomiting: no nausea  Complications: no  Cardiovascular status: blood pressure returned to baseline  Respiratory status: acceptable  Hydration status: euvolemic  Comments: Multimodal analgesia pain management as indicated by procedure  Multimodal analgesia pain management approach

## 2022-06-20 NOTE — ANESTHESIA PROCEDURE NOTES
Peripheral Block    Patient location during procedure: pre-op  Reason for block: procedure for pain, post-op pain management and at surgeon's request  Start time: 6/20/2022 7:10 AM  End time: 6/20/2022 7:15 AM  Staffing  Performed: anesthesiologist   Anesthesiologist: Gavin Kovacs MD  Preanesthetic Checklist  Completed: patient identified, IV checked, site marked, risks and benefits discussed, surgical/procedural consents, equipment checked, pre-op evaluation, timeout performed, anesthesia consent given, oxygen available, monitors applied/VS acknowledged, fire risk safety assessment completed and verbalized and blood product R/B/A discussed and consented  Peripheral Block   Patient position: supine  Prep: ChloraPrep  Provider prep: mask and sterile gloves  Patient monitoring: cardiac monitor, continuous pulse ox, continuous capnometry, frequent blood pressure checks, IV access, oxygen and responsive to questions  Block type: Femoral  Adductor canal  Laterality: left  Injection technique: single-shot  Guidance: ultrasound guided  Local infiltration: lidocaine  Infiltration strength: 1 %  Local infiltration: lidocaine  Dose: 4 mL    Needle   Needle type: pencil-tip   Needle gauge: 20 G  Needle localization: ultrasound guidance  Assessment   Injection assessment: negative aspiration for heme, no paresthesia on injection, local visualized surrounding nerve on ultrasound and no intravascular symptoms  Paresthesia pain: none  Slow fractionated injection: yes  Real-time US image taken/store: yes  Outcomes: uncomplicated and patient tolerated procedure well    Additional Notes  preprocedure ready time 0710  Medications Administered  Bupivacaine (MARCAINE) injection 0.25%, 20 mL  bupivacaine liposome (EXPAREL) injection 1.3%, 10 mL

## 2022-06-20 NOTE — BRIEF OP NOTE
Brief Postoperative Note      Patient: Jennifer Francois  YOB: 1955  MRN: [id number]    Date of Procedure: 6/20/2022    Pre-Op Diagnosis: LEFT KNEE OA    Post-Op Diagnosis: Same       Procedure(s):  LEFT KNEE TOTAL ARTHROPLASTY - Syd Dao    Surgeon(s):  Kari Evans MD    Assistant:  Surgical Assistant: Ember Dunn  Resident: Lemuel Alex DO    Anesthesia: General    Estimated Blood Loss (mL): less than 515     Complications: None    Specimens:   * No specimens in log *    Implants:  Implant Name Type Inv.  Item Serial No.  Lot No. LRB No. Used Action   CEMENT BNE 40GM FULL DOSE PMMA W/O ANTIBIO HI VISC N RADPQ - [id number]  CEMENT BNE 40GM FULL DOSE PMMA W/O ANTIBIO HI VISC N RADPQ  [id number]- [id number] Left 2 Implanted   ITOTAL IDENTITY PS FEMORAL IMPLANT TIBIAL TRAY 3 SETS POLY INSERTS   [id number]   Left 1 Implanted   ITOTAL IDENTITY PS FEMORAL IMPLANT LEFT   [id number]   Left 1 Implanted   ITOTAL IDENTITY PS TIBIAL T RAY LEFT   [id number]   Left 1 Implanted   IMPLANT PAT 35X7MM CR IPOLY ITOTAL - [id number]  IMPLANT PAT 35X7MM CR IPOLY ITOTAL  [id number] Left 1 Implanted   ITOTAL IDENTITY PS TIBIAL INSERT IPOLY 6MM LEFT   [id number]   Left 1 Implanted         Drains: * No LDAs found *    Findings: Severe OA with moderate varus deformity    Electronically signed by Kari Evans MD on 6/20/2022 at 10:15 AM

## 2022-06-20 NOTE — ANESTHESIA PRE PROCEDURE
Department of Anesthesiology  Preprocedure Note       Name:  Brett Brown   Age:  79 y.o.  :  1955                                          MRN:  [id number]         Date:  2022      Surgeon: Hemant Victoria):  Mack Dykes MD    Procedure: Procedure(s):  LEFT KNEE TOTAL ARTHROPLASTY - CONFORMIS PS, CEMENTED METHACRYLATE TO OSTEOPENIC BONE    Medications prior to admission:   Prior to Admission medications    Medication Sig Start Date End Date Taking? Authorizing Provider   amitriptyline (ELAVIL) 75 MG tablet 1 tablet at bedtime    Historical Provider, MD   tiZANidine (ZANAFLEX) 4 MG tablet tizanidine 4 mg tablet    Historical Provider, MD   fish oil-omega-3 fatty acids 1000 MG capsule Take 2 g by mouth 2 times daily    Historical Provider, MD   rizatriptan (MAXALT-MLT) 5 MG disintegrating tablet 1 tablet    Historical Provider, MD   coenzyme Q10 200 MG CAPS capsule Take 1 capsule by mouth daily 20   Historical Provider, MD   diclofenac (VOLTAREN) 75 MG EC tablet Take 75 mg by mouth 2 times daily   Patient not taking: Reported on 2022   Historical Provider, MD   dilTIAZem [facility]) 240 MG extended release capsule Take 240 mg by mouth daily 21   Historical Provider, MD   furosemide (LASIX) 20 MG tablet Take 20 mg by mouth every 48 hours 12/3/21   Historical Provider, MD   liothyronine (CYTOMEL) 5 MCG tablet Take 5 mcg by mouth daily  13   Historical Provider, MD   losartan (COZAAR) 100 MG tablet Take 1 tablet by mouth daily 21   Historical Provider, MD   metoprolol succinate (TOPROL XL) 200 MG extended release tablet Take 200 mg by mouth daily  21   Historical Provider, MD   oxyCODONE-acetaminophen (PERCOCET) 5-325 MG per tablet Take 1 tablet by mouth every 4 hours as needed.   12/3/18   Historical Provider, MD   potassium chloride (KLOR-CON) 10 MEQ extended release tablet Take 10 mEq by mouth daily     Historical Provider, MD   omeprazole (PRILOSEC) 40 MG delayed release capsule Take 40 mg by mouth daily  4/24/13   Historical Provider, MD   rizatriptan (MAXALT) 5 MG tablet Take 5 mg by mouth once as needed  5/29/20   Historical Provider, MD   simvastatin (ZOCOR) 10 MG tablet Take 10 mg by mouth nightly  5/17/21   Historical Provider, MD   levothyroxine (SYNTHROID) 88 MCG tablet Take 88 mcg by mouth Daily  4/24/13   Historical Provider, MD   zolpidem (AMBIEN CR) 6.25 MG extended release tablet Take 1 tablet by mouth daily.  12/13/21   Historical Provider, MD   Multiple Vitamins-Minerals (MULTIVITAMIN ADULTS PO) Take 1 tablet by mouth daily    Historical Provider, MD   Pima-3 1000 MG CAPS Take 2,000 mg by mouth daily    Historical Provider, MD   denosumab (PROLIA) 60 MG/ML SOSY SC injection Inject 60 mg into the skin once January and July    Historical Provider, MD       Current medications:    Current Facility-Administered Medications   Medication Dose Route Frequency Provider Last Rate Last Admin    [START ON 6/21/2022] lidocaine PF 1 % injection 1 mL  1 mL IntraDERmal Once PRN MD Eryn [address] ON 6/21/2022] lactated ringers infusion   IntraVENous Continuous Dipesh Cueto MD        sodium chloride flush 0.9 % injection 5-40 mL  5-40 mL IntraVENous 2 times per day Dipesh Cueto MD        sodium chloride flush 0.9 % injection 5-40 mL  5-40 mL IntraVENous PRN Dipesh Cueto MD        0.9 % sodium chloride infusion   IntraVENous PRN Dipesh Cueto MD        acetaminophen (TYLENOL) tablet 1,000 mg  1,000 mg Oral Once Dawood Bolaños, DO        ceFAZolin (ANCEF) 2000 mg in dextrose 5 % 50 mL IVPB  2,000 mg IntraVENous Once Blair Jason MD        celecoxib (CELEBREX) capsule 200 mg  200 mg Oral Once Dawood Bolaños DO        gabapentin (NEURONTIN) capsule 300 mg  300 mg Oral Once Dawood Bolaños, DO        midazolam (VERSED) injection 2 mg  2 mg IntraVENous Once PRN Cherelle Ambrose MD        bupivacaine liposome (EXPAREL) 1.3 % injection 133 mg  10 mL Infiltration Once PRN Halima Andrews MD           Allergies: Allergies   Allergen Reactions    Amoxicillin Itching     Other reaction(s): Yeast Infections  Sores in mouth         Problem List:  There is no problem list on file for this patient.       Past Medical History:        Diagnosis Date    Arthritis     CAD (coronary artery disease)     CHF (congestive heart failure) (HCC)     pt has not been diagnosed however on medications for her heart and a diuretic, assumes CHF    CPAP (continuous positive airway pressure) dependence     DJD (degenerative joint disease)     left ankle, left knee, rght knee    Fibromyalgia     GERD (gastroesophageal reflux disease)     Hyperlipidemia     Hypertension     Mitral valve regurgitation     Seasonal allergies     Sleep apnea     Spinal stenosis     Thyroid disease     hashimotos       Past Surgical History:        Procedure Laterality Date    ANKLE SURGERY Left     x3    BREAST BIOPSY Right     DENTAL SURGERY      pt has permanant implanted dentures    HYSTERECTOMY      KNEE ARTHROSCOPY Left     LYMPHADENECTOMY Right     axilla, not cancer    PAIN MANAGEMENT PROCEDURE      back ablations, last @/2022       Social History:    Social History     Tobacco Use    Smoking status: Never Smoker    Smokeless tobacco: Never Used   Substance Use Topics    Alcohol use: Never                                Counseling given: Not Answered      Vital Signs (Current):   Vitals:    06/20/22 0615 06/20/22 0635   BP:  (!) 143/73   Pulse:  84   Resp:  16   Temp:  97.5 °F (36.4 °C)   TempSrc:  Bladder   SpO2:  97%   Weight: 231 lb (104.8 kg)    Height: 5' 5\" (1.651 m)                                               BP Readings from Last 3 Encounters:   06/20/22 (!) 143/73   05/26/22 138/78       NPO Status:                                                                                 BMI:   Wt Readings from Last 3 Encounters:   06/20/22 231 lb (104.8 kg)   05/26/22 231 lb (104.8 kg) Body mass index is 38.44 kg/m². CBC:   Lab Results   Component Value Date    WBC 8.4 05/26/2022    RBC 3.71 05/26/2022    HGB 11.8 05/26/2022    HCT 36.7 05/26/2022    MCV 98.9 05/26/2022    RDW 12.7 05/26/2022     05/26/2022       CMP:   Lab Results   Component Value Date     05/26/2022    K 4.0 05/26/2022     05/26/2022    CO2 27 05/26/2022    BUN 16 05/26/2022    CREATININE 1.00 05/26/2022    GFRAA >60 05/26/2022    LABGLOM 55 05/26/2022    GLUCOSE 126 05/26/2022    PROT 6.8 05/26/2022    CALCIUM 9.6 05/26/2022    BILITOT 0.26 05/26/2022    ALKPHOS 74 05/26/2022    AST 19 05/26/2022    ALT 12 05/26/2022       POC Tests: No results for input(s): POCGLU, POCNA, POCK, POCCL, POCBUN, POCHEMO, POCHCT in the last 72 hours. Coags:   Lab Results   Component Value Date    PROTIME 12.2 05/26/2022    INR 0.9 05/26/2022    APTT 27.5 05/26/2022       HCG (If Applicable): No results found for: PREGTESTUR, PREGSERUM, HCG, HCGQUANT     ABGs: No results found for: PHART, PO2ART, FMO6HDD, DSM6TZZ, BEART, C9JGDLIU     Type & Screen (If Applicable):  No results found for: LABABO, LABRH    Drug/Infectious Status (If Applicable):  No results found for: HIV, HEPCAB    COVID-19 Screening (If Applicable): No results found for: COVID19        Anesthesia Evaluation     history of anesthetic complications: PONV. Airway: Mallampati: III  TM distance: <3 FB   Neck ROM: limited  Mouth opening: > = 3 FB   Dental:          Pulmonary:normal exam    (+) sleep apnea: on CPAP,                             Cardiovascular:  Exercise tolerance: poor (<4 METS),   (+) hypertension:, CAD:, CHF:,     (-)  angina                Neuro/Psych:   (+) neuromuscular disease (fibromyalgia, spinal stenosis):,             GI/Hepatic/Renal:   (+) GERD:, morbid obesity          Endo/Other:    (+) : arthritis:., .                 Abdominal:             Vascular:           Other Findings:           Anesthesia Plan      regional and general ASA 3     (Glidescope  Low dose propofol gtt for ponv  ketamine)  Induction: intravenous. MIPS: Postoperative opioids intended and Prophylactic antiemetics administered. Anesthetic plan and risks discussed with patient. Use of blood products discussed with patient whom consented to blood products. Plan discussed with CRNA and attending.     Attending anesthesiologist reviewed and agrees with Arron Green MD   6/20/2022

## 2022-06-20 NOTE — PROGRESS NOTES
Physical Therapy  Facility/Department: [facility]  Physical Therapy Initial Assessment    Name: Blayne Blue  : 1955  MRN: [id number]  Date of Service: 2022    Discharge Recommendations:    Pt presenting with new musculoskeletal dysfunction and would benefit from additional therapy at time of discharge. Please refer to the AM-PAC score for current functional status. L TKA by Dr. Nilson Phillips 22      Patient Diagnosis(es): There were no encounter diagnoses. Past Medical History:  has a past medical history of Arthritis, CAD (coronary artery disease), CHF (congestive heart failure) ([facility] [address] CPAP (continuous positive airway pressure) dependence, DJD (degenerative joint disease), Fibromyalgia, GERD (gastroesophageal reflux disease), Hyperlipidemia, Hypertension, Mitral valve regurgitation, Seasonal allergies, Sleep apnea, Spinal stenosis, and Thyroid disease. Past Surgical History:  has a past surgical history that includes Hysterectomy; Ankle surgery (Left); lymphadenectomy (Right); Pain management procedure; Breast biopsy (Right); Knee arthroscopy (Left); Dental surgery; and Total knee arthroplasty (Left, 2022).     Assessment   Specific Instructions for Next Treatment: REASSESS in AM- transfers/ gait/ stairs  Requires PT Follow-Up: Yes  Activity Tolerance  Activity Tolerance: Treatment limited secondary to medical complications  Activity Tolerance Comments: numb LLE from nerve block     Plan   Plan  Plan: 2 times a day 7 days a week  Specific Instructions for Next Treatment: REASSESS in AM- transfers/ gait/ stairs  Current Treatment Recommendations: Strengthening,ROM,Balance training  Safety Devices  Type of Devices: Gait belt,All fall risk precautions in place,Call light within reach,Patient at risk for falls,Left in bed,Nurse notified,Bed alarm in place  Restraints  Restraints Initially in Place: No     Restrictions  Restrictions/Precautions  Restrictions/Precautions: General Precautions,Fall Risk,Up as Tolerated,Weight Bearing,Surgical protocol,Bed Alarm  Required Braces or Orthoses?: No  Lower Extremity Weight Bearing Restrictions  Left Lower Extremity Weight Bearing: Weight Bearing As Tolerated  Position Activity Restriction  Other position/activity restrictions: L UE IV;  foot drop L and knee buckling from nerve block day of surgery- walking not attempted     Subjective   Pain: Identifies some pain with movement  General  Chart Reviewed: Yes  Patient assessed for rehabilitation services?: Yes  Family / Caregiver Present: Yes (spouse)  Follows Commands: Within Functional Limits         Social/Functional History  Social/Functional History  Lives With: Spouse (7 family members living at home)  Type of Home: House  Home Layout: Multi-level (5 levels; no bathroom on main floor; full flight up to bathroom/bedroom.)  Home Access: Stairs to enter with rails  Entrance Stairs - Number of Steps: 3 or 4  Entrance Stairs - Rails: Right  Bathroom Shower/Tub: Walk-in shower,Shower chair without back  Bathroom Toilet: Handicap height  Bathroom Equipment:  (leans on half wall by toilet)  Home Equipment: Shivani Budd, rolling,Reacher,Long-handled shoehorn,Sock aid,Cane  Has the patient had two or more falls in the past year or any fall with injury in the past year?: Yes (Clara Lewis 3 times in Dec d/t L knee giving out)  Receives Help From: Family  ADL Assistance: Independent (Used cane before surgery)  Homemaking Assistance: Needs assistance (Needs asssit with cleaning bathroom floor; limited d/t chronic back pain; doesn't go grocery shopping often (uses cane or scooter when she does); daughter in law mostly cooks)  Homemaking Responsibilities: Yes  Ambulation Assistance: Independent  Transfer Assistance: Independent  Active : Yes  Occupation: Part time employment  Type of Occupation:   Leisure & Hobbies: Key board, singing, computer games  Vision/Hearing       Cognition   Orientation  Overall Orientation Status: Within Functional Limits  Cognition  Overall Cognitive Status: Exceptions  Arousal/Alertness: Appropriate responses to stimuli  Following Commands: Follows all commands without difficulty  Attention Span: Appears intact  Memory: Appears intact  Safety Judgement: Decreased awareness of need for assistance;Decreased awareness of need for safety  Problem Solving: Decreased awareness of errors;Assistance required to correct errors made;Assistance required to identify errors made  Insights: Decreased awareness of deficits  Initiation: Does not require cues  Sequencing: Does not require cues     Objective   Observation/Palpation  Posture: Good  Observation: LUE IV; LLE ace wrap, kathleen  Gross Assessment  Sensation: Impaired (numbness and foot drop LLE- nerve block)     AROM RLE (degrees)  RLE AROM: WNL  AROM LLE (degrees)  LLE General AROM: knee 5- 80 degrees  Strength LLE  Comment: + SLR with quad lag;  + partial LAQ (pain well controlled from nerve block)        Bed Mobility Training  Bed Mobility Training: Yes  Balance  Sitting: Intact  Standing: With support  Transfer Training  Transfer Training: Yes  Gait  Overall Level of Assistance:  (Pt trialed weight shifting through BLEs in standing with RW. Pt not safe to take steps this date d/t L foot drop and L knee buckling from diminished sensation. Jose Sous needed for safe ADL transfers/functional mob this date.)  Bed mobility  Supine to Sit: Stand by assistance  Sit to Supine: Stand by assistance  Scooting: Stand by assistance  Bed Mobility Comments: MIN VCs for pacing and assist/mgmt of lines throughout. Transfers  Sit to Stand: Minimal Assistance  Stand to sit: Minimal Assistance  Comment: Once standing safely at bedside with RW, took pt. through pre-gait sequence to ensure quad control prior to ambulation. Assessed control of knee during static standing, wt. shifts and steps in place. Pt. with significant buckling of knee and not safe to amb. away from bed at this time. Exercise Treatment:  Pt. instructed in quad/glute isometrics to be done x 10 reps every 1-2 hrs. as able. Encouraged pt. to frequently AP for increased LE circulation and perform heel slides as needed to prevent hip/knee stiffness. Pt. With good demo and understanding. OutComes Score                                                  AM-PAC Score  AM-PAC Inpatient Mobility Raw Score : 15 (06/20/22 1733)  AM-PAC Inpatient T-Scale Score : 39.45 (06/20/22 1733)  Mobility Inpatient CMS 0-100% Score: 57.7 (06/20/22 1733)  Mobility Inpatient CMS G-Code Modifier : CK (06/20/22 1733)          Goals  Short Term Goals  Time Frame for Short term goals: 8 visits:  Short term goal 1: Pt. to be indep with bed mob. using sheet as leg  for surgical LE as needed  Short term goal 2: Pt to be indep with sit to stand with RW with safe hand placement 100% of time  Short term goal 3: Pt. to be indep with gait with RW 100ft. Short term goal 4: Pt. to safely negotiate 5 steps to prepare for d/c home with stairs  Short term goal 5: Pt to tolerate 25+ min. of PT daily for TKA ther ex, gait, balance training  Patient Goals   Patient goals : d/c home ASAP       Education  Patient Education  Education Given To: Patient; Family  Education Provided: Role of Therapy;Plan of Care;Home Exercise Program  Education Method: Demonstration;Verbal  Education Outcome: Verbalized understanding;Demonstrated understanding      Therapy Time   Individual Concurrent Group Co-treatment   Time In 4037         Time Out 1505 (also [id number])         Minutes 51              Treatment time:  67 min. Co-treatment with OT warranted first time up day of surgery. Cotx due to potential risk of decreased sensation, muscle control and proprioception from spinal epidural and/or regional block. Decreased safety and independence requiring 2 skilled therapy professionals to address individual discipline's goals. RETURNED from [id number] to assess if change in sensation LLE. Pt. Still presented with foot drop L and no feeling dorsum of foot or Medial calf. No change in symptoms in last 90 min. Unsafe to proceed with gait training at this time. Will assess in AM and prepare for d/c home. Reviewed TKA day of surgery homework ex. Pt. With good demo and understanding.      Christian Tristan, PT

## 2022-06-20 NOTE — CARE COORDINATION
sociial work: Eran Skinner rep for walker to be delivered later today. That covers the pt need for walker. Faxed to [facility] fax [phone] and phone [phone].  Rep cell [address]

## 2022-06-21 VITALS
OXYGEN SATURATION: 98 % | RESPIRATION RATE: 18 BRPM | TEMPERATURE: 97.9 F | SYSTOLIC BLOOD PRESSURE: 115 MMHG | BODY MASS INDEX: 38.49 KG/M2 | DIASTOLIC BLOOD PRESSURE: 49 MMHG | HEIGHT: 65 IN | HEART RATE: 84 BPM | WEIGHT: 231 LBS

## 2022-06-21 PROCEDURE — 97535 SELF CARE MNGMENT TRAINING: CPT

## 2022-06-21 PROCEDURE — 97110 THERAPEUTIC EXERCISES: CPT

## 2022-06-21 PROCEDURE — 97530 THERAPEUTIC ACTIVITIES: CPT

## 2022-06-21 PROCEDURE — 2580000003 HC RX 258: Performed by: ORTHOPAEDIC SURGERY

## 2022-06-21 PROCEDURE — 97535 SELF CARE MNGMENT TRAINING: CPT | Performed by: NURSE PRACTITIONER

## 2022-06-21 PROCEDURE — 6370000000 HC RX 637 (ALT 250 FOR IP): Performed by: ORTHOPAEDIC SURGERY

## 2022-06-21 PROCEDURE — 97116 GAIT TRAINING THERAPY: CPT

## 2022-06-21 PROCEDURE — 97530 THERAPEUTIC ACTIVITIES: CPT | Performed by: NURSE PRACTITIONER

## 2022-06-21 RX ADMIN — ACETAMINOPHEN 650 MG: 325 TABLET ORAL at 00:42

## 2022-06-21 RX ADMIN — HYDROMORPHONE HYDROCHLORIDE 2 MG: 2 TABLET ORAL at 06:54

## 2022-06-21 RX ADMIN — TIZANIDINE 2 MG: 4 TABLET ORAL at 09:03

## 2022-06-21 RX ADMIN — ACETAMINOPHEN 650 MG: 325 TABLET ORAL at 06:46

## 2022-06-21 RX ADMIN — ASPIRIN 325 MG: 325 TABLET, COATED ORAL at 09:02

## 2022-06-21 RX ADMIN — Medication 10 MG: at 09:04

## 2022-06-21 RX ADMIN — OMEPRAZOLE 40 MG: 40 CAPSULE, DELAYED RELEASE ORAL at 06:46

## 2022-06-21 RX ADMIN — LIOTHYRONINE SODIUM 5 MCG: 5 TABLET ORAL at 06:46

## 2022-06-21 RX ADMIN — SODIUM CHLORIDE, PRESERVATIVE FREE 10 ML: 5 INJECTION INTRAVENOUS at 09:07

## 2022-06-21 RX ADMIN — LEVOTHYROXINE SODIUM 88 MCG: 88 TABLET ORAL at 06:46

## 2022-06-21 RX ADMIN — DILTIAZEM HYDROCHLORIDE 240 MG: 240 CAPSULE, COATED, EXTENDED RELEASE ORAL at 09:06

## 2022-06-21 ASSESSMENT — PAIN DESCRIPTION - LOCATION: LOCATION: HIP;KNEE

## 2022-06-21 ASSESSMENT — PAIN SCALES - GENERAL
PAINLEVEL_OUTOF10: 8
PAINLEVEL_OUTOF10: 7

## 2022-06-21 ASSESSMENT — PAIN DESCRIPTION - ORIENTATION: ORIENTATION: LEFT

## 2022-06-21 ASSESSMENT — PAIN DESCRIPTION - DESCRIPTORS: DESCRIPTORS: ACHING;SORE;TIGHTNESS

## 2022-06-21 NOTE — PROGRESS NOTES
Occupational Therapy  Facility/Department: [facility] MED SURG  Occupational Therapy Re-Assessment    Name: Jordin Barker  : 1955  MRN: [id number]  Date of Service: 2022      S/P L TKA 2022  RN reports patient is medically stable for therapy treatment this date. Chart reviewed prior to treatment and patient is agreeable for therapy. All lines intact and patient positioned comfortably at end of treatment. All patient needs addressed prior to ending therapy session. Patient Diagnosis(es): There were no encounter diagnoses. Past Medical History:  has a past medical history of Arthritis, CAD (coronary artery disease), CHF (congestive heart failure) ([facility] [address] CPAP (continuous positive airway pressure) dependence, DJD (degenerative joint disease), Fibromyalgia, GERD (gastroesophageal reflux disease), Hyperlipidemia, Hypertension, Mitral valve regurgitation, Seasonal allergies, Sleep apnea, Spinal stenosis, and Thyroid disease. Past Surgical History:  has a past surgical history that includes Hysterectomy; Ankle surgery (Left); lymphadenectomy (Right); Pain management procedure; Breast biopsy (Right); Knee arthroscopy (Left); Dental surgery; and Total knee arthroplasty (Left, 2022). Assessment   Performance deficits / Impairments: Decreased functional mobility ; Decreased ADL status; Decreased ROM; Decreased strength;Decreased safe awareness;Decreased balance;Decreased endurance  Assessment: pt reassessed this am; doing well progressing towards all goals for safe d/c home this date.   Prognosis: Good  Decision Making: Medium Complexity  REQUIRES OT FOLLOW-UP: Yes  Activity Tolerance  Activity Tolerance: Patient Tolerated treatment well        Plan   Plan  Times per Week: 4-5x a week, 1-2x a day  Current Treatment Recommendations: Strengthening,Balance training,ROM,Functional mobility training,Endurance training,Positioning,Equipment evaluation, education, & procurement,Patient/Caregiver education & training,Safety education & training,Self-Care / ADL     Restrictions  Restrictions/Precautions  Restrictions/Precautions: General Precautions,Fall Risk,Up as Tolerated,Weight Bearing,Surgical protocol,Bed Alarm  Required Braces or Orthoses?: No  Lower Extremity Weight Bearing Restrictions  Left Lower Extremity Weight Bearing: Weight Bearing As Tolerated  Position Activity Restriction  Other position/activity restrictions: L UE IV;  foot drop L and knee buckling from nerve block day of surgery- walking not attempted    Subjective   General  Chart Reviewed: Yes  Patient assessed for rehabilitation services?: Yes  Family / Caregiver Present: No     Social/Functional History  Social/Functional History  Lives With: Spouse (7 family members living at home)  Type of Home: House  Home Layout: Multi-level (5 levels; no bathroom on main floor; full flight up to bathroom/bedroom.)  Home Access: Stairs to enter with rails  Entrance Stairs - Number of Steps: 3 or 4  Entrance Stairs - Rails: Right  Bathroom Shower/Tub: Walk-in shower,Shower chair without back  Bathroom Toilet: Handicap height  Bathroom Equipment:  (leans on half wall by toilet)  Home Equipment: Jeananne Garre, rolling,Reacher,Long-handled shoehorn,Sock aid,Cane  Has the patient had two or more falls in the past year or any fall with injury in the past year?: Yes (Cassi Felder 3 times in Dec d/t L knee giving out)  Receives Help From: Family  ADL Assistance: Independent (Used cane before surgery)  Homemaking Assistance: Needs assistance (Needs asssit with cleaning bathroom floor; limited d/t chronic back pain; doesn't go grocery shopping often (uses cane or scooter when she does); daughter in law mostly cooks)  Homemaking Responsibilities: Yes  Ambulation Assistance: Independent  Transfer Assistance: Independent  Active : Yes  Occupation: Part time employment  Type of Occupation:   Leisure & Hobbies: Key board, singing, computer games Objective   Heart Rate: 84  Heart Rate Source: Monitor  BP: (!) 115/49  BP Location: Left Arm  MAP (Calculated): 71  Resp: 18  SpO2: 98 %  O2 Device: None (Room air)  Vision Exceptions: Wears glasses for reading  Hearing: Within functional limits       Observation/Palpation  Posture: Good  Observation: LUE IV; LLE ace wrap, kathleen  Safety Devices  Type of Devices: Gait belt; All fall risk precautions in place;Call light within reach; Patient at risk for falls; Left in bed;Nurse notified; Bed alarm in place  Restraints  Restraints Initially in Place: No  Bed Mobility Training  Supine to Sit: Stand-by assistance  Balance  Sitting: Intact  Standing: High guard  Transfer Training  Transfer Training: Yes  Overall Level of Assistance: Contact-guard assistance (VC for safety and RW throughout all tranfers. From chair to RW to TTB to RW to chair.)  Interventions: Safety awareness training;Verbal cues;Demonstration  Sit to Stand: Contact-guard assistance  Stand to Sit: Contact-guard assistance  Toilet Transfer: Contact-guard assistance;Minimum assistance (cues for safety to stay inside of RW with turning)  Car Transfer: Minimum assistance  Gait  Overall Level of Assistance: Stand-by assistance (ADL distance with good safety awareness, pacing, and sequencing.)  Interventions: Safety awareness training;Verbal cues;Demonstration (demonstrated and education pt on RW use during both ADL and IADL situations at home; pt instructed to stay inside of device at all times, including when reaching, standing at counters, etc)  Toilet Transfers  Toilet - Technique: Ambulating  Equipment Used: Standard toilet  Toilet Transfer: Contact guard assistance;Minimal assistance  AROM: Within functional limits  Strength:  Within functional limits  Coordination: Within functional limits  Tone: Normal  Sensation: Impaired (LLE feels asleep currently)  ADL  Feeding: Independent;Setup  Grooming: Setup (Brushing hair seated in chair)  Grooming Skilled Clinical Factors: standing at sink with SBA for grooming/oral care and hand washing following toileting. Ed on RW safety with approaching sink and turning from toilet. UE Bathing: Stand by assistance;Setup;Verbal cueing  LE Bathing: Minimal assistance;Verbal cueing;Setup  LE Bathing Skilled Clinical Factors: Edu provided on safe tech to wash leg, keeping aligned vs twisting knee to reach feet, as well as coordinating breathing with activity. UE Dressing: Stand by assistance;Setup  LE Dressing: Minimal assistance;Maximum assistance  LE Dressing Skilled Clinical Factors: SBA to doff socks, doff/don underwear, maxA to don DAVIE hose and socks  Toileting: Minimal assistance  Toileting Skilled Clinical Factors: cues for safety with turning to flush toilet and assuring balance/LE position before pulling up underwear. Bed mobility  Supine to Sit: Stand by assistance  Bed Mobility Comments: up to chair  Transfers  Sit to stand: Contact guard assistance  Stand to sit: Contact guard assistance     Cognition  Overall Cognitive Status: Exceptions  Arousal/Alertness: Appropriate responses to stimuli  Following Commands: Follows all commands without difficulty  Attention Span: Appears intact  Memory: Appears intact  Safety Judgement: Decreased awareness of need for assistance;Decreased awareness of need for safety  Problem Solving: Decreased awareness of errors;Assistance required to correct errors made;Assistance required to identify errors made  Insights: Decreased awareness of deficits  Initiation: Does not require cues  Sequencing: Does not require cues  Perception  Overall Perceptual Status: [facility]            Exercise Treatment: IS use, ankle pumps, functional mobility every hour. Education Given To: Patient  Education Provided: Role of Therapy;Plan of Care;Equipment; Energy Conservation;Transfer Training;Precautions; ADL Adaptive Strategies; Fall Prevention Strategies;IADL Safety  Education Method: Demonstration;Verbal  Education Outcome: Demonstrated understanding;Verbalized understanding  LUE AROM (degrees)  LUE AROM : WFL  RUE AROM (degrees)  RUE AROM : [facility]            AM-PAC Inpatient Daily Activity Raw Score: 20 (06/20/22 1528)  AM-PAC Inpatient ADL T-Scale Score : 42.03 (06/20/22 1528)  ADL Inpatient CMS 0-100% Score: 38.32 (06/20/22 1528)  ADL Inpatient CMS G-Code Modifier : Valerianoyanique Singer (06/20/22 1528)    Goals  Short Term Goals  Time Frame for Short term goals: By discharge, pt to  Short Term Goal 1: demo SBA with functional mob with RW and approp DME for ADL competion with good safety  Short Term Goal 2: demo SBA with ADL transfers with good safety, pacing and use of AD/DME as needed. Short Term Goal 3: demo SBA with toileting tasks with good safety, pacing and use of AD/DME as needed. Short Term Goal 4: demo I with bed mob tasks with use of bed rails as needed. Short Term Goal 5: demo I with UB ADLs and CGA with LB ADLs with good safety, pacing and use of AD/AE as needed. Long Term Goals  Long Term Goal 1: demo and verb good understanding of edu with ADL compensatory techs, possible equip needs, RW safety/mgmt, infection protection techs, use of B ray hose, EC/WS techs, breathing techs, edema mgmt techs, and fall prevention techs.   Patient Goals   Patient goals : to go home       Therapy Time   Individual Concurrent Group Co-treatment   Time In [address] [facility] [address], [address]         Time Out 0923         Minutes [address] [place] [address]

## 2022-06-21 NOTE — PROGRESS NOTES
Pt discharged home in stable condition. Discharge instructions given  Medications Escripted to patients pharmacy. Patients home medications sent with patient. Pt denies having any further questions at this time  Personal items given to patient at discharge  Patient/family state they have everything they were admitted with. Hibiclens sent home with patient. Walker delivered to patient. Patient taken out to car by wheelchair with staff and .

## 2022-06-21 NOTE — PROGRESS NOTES
Physical Therapy  Facility/Department: [facility] MED SURG  Physical Therapy Initial Assessment    Name: Kim Hutson  : 1955  MRN: [id number]  Date of Service: 2022    Discharge Recommendations:    Pt presenting with new musculoskeletal dysfunction and would benefit from additional therapy at time of discharge. Please refer to the AM-PAC score for current functional status. L TKA by Dr. Tyree Montoya 22      Patient Diagnosis(es): There were no encounter diagnoses. Past Medical History:  has a past medical history of Arthritis, CAD (coronary artery disease), CHF (congestive heart failure) ([facility] [address] CPAP (continuous positive airway pressure) dependence, DJD (degenerative joint disease), Fibromyalgia, GERD (gastroesophageal reflux disease), Hyperlipidemia, Hypertension, Mitral valve regurgitation, Seasonal allergies, Sleep apnea, Spinal stenosis, and Thyroid disease. Past Surgical History:  has a past surgical history that includes Hysterectomy; Ankle surgery (Left); lymphadenectomy (Right); Pain management procedure; Breast biopsy (Right); Knee arthroscopy (Left); Dental surgery; and Total knee arthroplasty (Left, 2022). Assessment   Body Structures, Functions, Activity Limitations Requiring Skilled Therapeutic Intervention: Decreased functional mobility ; Decreased strength;Decreased ROM    Assessment: Pt. doing well POD 1 and ready for safe d/c home today. Pt. has met all PT goals for safe same day discharge including safe bed mobility and transfer techniques, safe ambulation with RW including walker safety, training for safe stair negotiation, HEP and edu. for frequent mobility at home, and verbal review of car transfer. Time taken to answer all questions from pt. and  to ensure preparedness.        Therapy Prognosis: Excellent  Decision Making: Medium Complexity  Requires PT Follow-Up: Yes  Activity Tolerance  Activity Tolerance: Patient tolerated treatment well     Plan   Plan  Plan: 2 times a day 7 days a week  Specific Instructions for Next Treatment: REASSESS in AM- transfers/ gait/ stairs  Current Treatment Recommendations: Strengthening,ROM,Balance training  Safety Devices  Type of Devices: Gait belt,All fall risk precautions in place,Call light within reach,Patient at risk for falls,Nurse notified,All negro prominences offloaded,Left in chair,Chair alarm in place  Restraints  Restraints Initially in Place: No     Restrictions  Restrictions/Precautions  Restrictions/Precautions: General Precautions,Fall Risk,Up as Tolerated,Weight Bearing,Surgical protocol,Bed Alarm  Required Braces or Orthoses?: No  Lower Extremity Weight Bearing Restrictions  Left Lower Extremity Weight Bearing: Weight Bearing As Tolerated  Position Activity Restriction  Other position/activity restrictions: L UE IV     Subjective   General  Chart Reviewed: Yes  Patient assessed for rehabilitation services?: Yes  Family / Caregiver Present: No  Follows Commands: Within Functional Limits  Subjective  Subjective: Pt. stated she slept well and is ready for PT/OT today to prepare for d/c home         Social/Functional History  Social/Functional History  Lives With: Spouse (7 family members living at home)  Type of Home: House  Home Layout: Multi-level (5 levels; no bathroom on main floor; full flight up to bathroom/bedroom.)  Home Access: Stairs to enter with rails  Entrance Stairs - Number of Steps: 3 or 4  Entrance Stairs - Rails: Right  Bathroom Shower/Tub: Walk-in shower,Shower chair without back  Bathroom Toilet: Handicap height  Bathroom Equipment:  (leans on half wall by toilet)  Home Equipment: Donnamae Bridger, rolling,Reacher,Long-handled shoehorn,Sock aid,Cane  Has the patient had two or more falls in the past year or any fall with injury in the past year?: Yes Glenn Calabrese 3 times in Dec d/t L knee giving out)  Receives Help From: Family  ADL Assistance: Independent (Used cane before surgery)  Homemaking Assistance: Needs assistance (Needs asssit with cleaning bathroom floor; limited d/t chronic back pain; doesn't go grocery shopping often (uses cane or scooter when she does); daughter in law mostly cooks)  Homemaking Responsibilities: Yes  Ambulation Assistance: Independent  Transfer Assistance: Independent  Active : Yes  Occupation: Part time employment  Type of Occupation:   Leisure & Hobbies: Key board, singing, computer games  Vision/Hearing       Cognition   Orientation  Overall Orientation Status: Within Functional Limits  Cognition  Overall Cognitive Status: Exceptions  Arousal/Alertness: Appropriate responses to stimuli  Following Commands: Follows all commands without difficulty  Attention Span: Appears intact  Memory: Appears intact  Safety Judgement: Decreased awareness of need for assistance;Decreased awareness of need for safety  Problem Solving: Decreased awareness of errors;Assistance required to correct errors made;Assistance required to identify errors made  Insights: Decreased awareness of deficits  Initiation: Does not require cues  Sequencing: Does not require cues     Objective      Observation/Palpation  Posture: Good  Observation: LUE IV; LLE ace wrap, kathleen  Gross Assessment  Sensation: Intact (numbness and foot drop have resolved LLE)     AROM RLE (degrees)  RLE AROM: WNL  AROM LLE (degrees)  LLE General AROM: knee 5- 80 degrees  Strength LLE  Comment: + SLR with quad lag;  + partial LAQ        Bed Mobility Training  Supine to Sit: Stand-by assistance  Balance  Sitting: Intact  Standing: High guard  Transfer Training  Transfer Training: Yes  Overall Level of Assistance: Contact-guard assistance (VC for safety and RW throughout all tranfers.  From chair to RW to TTB to RW to chair.)  Interventions: Safety awareness training;Verbal cues;Demonstration  Sit to Stand: Contact-guard assistance  Stand to Sit: Contact-guard assistance  Toilet Transfer: Contact-guard assistance;Minimum assistance (cues for safety to stay inside of RW with turning)  Car Transfer: Minimum assistance  Gait  Overall Level of Assistance: Stand-by assistance (ADL distance with good safety awareness, pacing, and sequencing.)  Interventions: Safety awareness training;Verbal cues;Demonstration (demonstrated and education pt on RW use during both ADL and IADL situations at home; pt instructed to stay inside of device at all times, including when reaching, standing at counters, etc)  Bed mobility  Supine to Sit: Stand by assistance  Bed Mobility Comments: up to chair  Transfers  Sit to Stand: Contact guard assistance  Stand to sit: Contact guard assistance  Comment: review of safe hand placement with RW and pt. with good demo. Ambulation  Surface: level tile  Device: Rolling Walker  Assistance: Contact guard assistance;Stand by assistance  Quality of Gait: Pt. initially with too large of step through Rt. LE.  Pt. corrected well with cues and maintained t/o session. ;  edu. for progressing gait with RW and using rhythm of song for equal step length eventually--- edu. for importance of use of RW until this is possible and not to transition to st. cane too soon  Distance: 70ft; 20ft; Comments: up to chair after gait        Exercise Treatment: issued pt. written TKA HEP and reviewed thoroughly with pt. Pt. with good demo and understanding of seated and standing strengthening and stretching ex.         OutComes Score                                                  AM-PAC Score  AM-PAC Inpatient Mobility Raw Score : 15 (06/20/22 1733)  AM-PAC Inpatient T-Scale Score : 39.45 (06/20/22 1733)  Mobility Inpatient CMS 0-100% Score: 57.7 (06/20/22 1733)  Mobility Inpatient CMS G-Code Modifier : CK (06/20/22 1733)          Goals  Short Term Goals  Time Frame for Short term goals: 8 visits:  Short term goal 1: Pt. to be indep with bed mob. using sheet as leg  for surgical LE as needed  Short term goal 2: Pt to be indep with sit to stand with RW with safe hand placement 100% of time  Short term goal 3: Pt. to be indep with gait with RW 100ft. Short term goal 4: Pt. to safely negotiate 5 steps to prepare for d/c home with stairs  Short term goal 5: Pt to tolerate 25+ min. of PT daily for TKA ther ex, gait, balance training  Patient Goals   Patient goals : d/c home ASAP       Education  Patient Education  Education Given To: Patient; Family  Education Provided: Role of Therapy;Plan of Care;Home Exercise Program  Education Provided Comments: see ex. section ;  see ASSESSMENT  Education Method: Demonstration;Verbal  Education Outcome: Verbalized understanding;Demonstrated understanding      Therapy Time   Individual Concurrent Group Co-treatment   Time In 26 [id number]         Time Out 8066         Minutes 61              Co-treatment with OT warranted first time up day of surgery. Cotx due to potential risk of decreased sensation, muscle control and proprioception from spinal epidural and/or regional block. Decreased safety and independence requiring 2 skilled therapy professionals to address individual discipline's goals.        Denice Rios, PT

## 2022-06-21 NOTE — PROGRESS NOTES
Occupational Therapy  Facility/Department: [facility]  Daily Treatment Note  NAME: Erica Soliman  : 1955  MRN: [id number]    Date of Service: 2022    Discharge Recommendations:  Patient would benefit from continued therapy after discharge    Due to recent hospitalization and medical condition, pt would benefit from additional intermittent skilled therapy at time of discharge. Please refer to the AM-PAC score for current functional status. Patient Diagnosis(es): There were no encounter diagnoses. Assessment     [facility]: 21  Assessment: Pt progressing well towards goals and demo good understanding of edu provided. Pt would benefit from continued skilled OT services to address deficits in areas of functional balance, functional reach, ADL completion, safety awareness, transfers and functional mobility, all to ensure safe return home to [facility]. Activity Tolerance: Patient tolerated treatment well  Discharge Recommendations: Patient would benefit from continued therapy after discharge      Plan   Plan  Times per Week: 4-5x a week, 1-2x a day  Current Treatment Recommendations: Strengthening;Balance training;ROM; Functional mobility training; Endurance training;Positioning;Equipment evaluation, education, & procurement;Patient/Caregiver education & training; Safety education & training;Self-Care / ADL     Restrictions  Restrictions/Precautions  Restrictions/Precautions: General Precautions; Fall Risk;Up as Tolerated;Weight Bearing;Surgical protocol; Bed Alarm  Lower Extremity Weight Bearing Restrictions  Left Lower Extremity Weight Bearing: Weight Bearing As Tolerated  Position Activity Restriction  Other position/activity restrictions: L UE IV;  foot drop L and knee buckling from nerve block day of surgery- walking not attempted    Subjective   Subjective  Subjective: Pt very receptive and eager to participate.   Orientation  Overall Orientation Status: Within Functional Limits  Cognition  Overall Cognitive Status: Exceptions  Arousal/Alertness: Appropriate responses to stimuli  Following Commands: Follows all commands without difficulty  Attention Span: Appears intact  Memory: Appears intact  Safety Judgement: Decreased awareness of need for assistance;Decreased awareness of need for safety  Problem Solving: Decreased awareness of errors;Assistance required to correct errors made;Assistance required to identify errors made  Insights: Decreased awareness of deficits  Initiation: Does not require cues  Sequencing: Does not require cues        Objective    Vitals  Vitals  O2 Device: None (Room air)  Bed Mobility Training  Supine to Sit: Stand-by assistance  Balance  Sitting: Intact  Standing: High guard  Transfer Training  Transfer Training: Yes  Overall Level of Assistance: Contact-guard assistance (VC for safety and RW throughout all tranfers. From chair to RW to TTB to RW to chair.)  Interventions: Safety awareness training;Verbal cues;Demonstration  Sit to Stand: Contact-guard assistance  Stand to Sit: Contact-guard assistance  Toilet Transfer: Contact-guard assistance;Minimum assistance (cues for safety to stay inside of RW with turning)  Car Transfer: Minimum assistance  Gait  Overall Level of Assistance: Stand-by assistance (ADL distance with good safety awareness, pacing, and sequencing.)  Interventions: Safety awareness training;Verbal cues;Demonstration (demonstrated and education pt on RW use during both ADL and IADL situations at home; pt instructed to stay inside of device at all times, including when reaching, standing at counters, etc)     ADL  Feeding: Independent;Setup  Grooming: Setup (Brushing hair seated in chair)  Grooming Skilled Clinical Factors: standing at sink with SBA for grooming/oral care and hand washing following toileting. Ed on RW safety with approaching sink and turning from toilet.   UE Bathing: Stand by assistance;Setup;Verbal cueing  LE Bathing: Minimal assistance;Verbal cueing;Setup  LE Bathing Skilled Clinical Factors: Edu provided on safe tech to wash leg, keeping aligned vs twisting knee to reach feet, as well as coordinating breathing with activity. UE Dressing: Stand by assistance;Setup  LE Dressing: Minimal assistance;Maximum assistance  LE Dressing Skilled Clinical Factors: SBA to doff socks, doff/don underwear, maxA to don DAVIE hose and socks  Toileting: Minimal assistance  Toileting Skilled Clinical Factors: cues for safety with turning to flush toilet and assuring balance/LE position before pulling up underwear. OT Exercises  Exercise Treatment: IS use, ankle pumps, functional mobility every hour. Safety Devices  Type of Devices: Gait belt; All fall risk precautions in place;Call light within reach; Patient at risk for falls;Nurse notified; All negro prominences offloaded;Left in chair;Chair alarm in place  Restraints  Restraints Initially in Place: No     Patient Education  Education Given To: Patient  Education Provided: Role of Therapy;Plan of Care;Equipment; Energy Conservation;Transfer Training;Precautions; ADL Adaptive Strategies; Fall Prevention Strategies;IADL Safety;Home Exercise Program  Education Provided Comments: Edu provided on proper positioning of surgical LE, edema control, compression stocking wear schedule and don/doff tech, inceptive spirometer and breathing tech. Education Method: Demonstration;Verbal  Barriers to Learning: None  Education Outcome: Demonstrated understanding;Verbalized understanding    Goals  Short Term Goals  Time Frame for Short term goals: By discharge, pt to  Short Term Goal 1: demo SBA with functional mob with RW and approp DME for ADL competion with good safety  Short Term Goal 2: demo SBA with ADL transfers with good safety, pacing and use of AD/DME as needed. Short Term Goal 3: demo SBA with toileting tasks with good safety, pacing and use of AD/DME as needed.   Short Term Goal 4: demo I with bed mob tasks with use of bed

## 2022-06-22 ENCOUNTER — CARE COORDINATION (OUTPATIENT)
Dept: CASE MANAGEMENT | Age: 67
End: 2022-06-22

## 2022-06-22 NOTE — CARE COORDINATION
[facility] Transitions of Care Initial Call    2022      Patient Name: Andrew Carbone         Patient : 1955     Discharge Date: 22    RARS: Readmission Risk Score: No data recorded  PCP: Rosario Yadav MD      Discharge Facility: [facility]    Call within 2 business days of discharge: Yes    Message left in compliance with HIPPA. Stated who I was, representing the [facility], Care Transitions Team.  Requested to place a call to their surgeon with any immediate needs/questions/concerns. CTN will continue to follow per Joint Bundle Protocol.

## 2022-06-23 ENCOUNTER — CARE COORDINATION (OUTPATIENT)
Dept: CASE MANAGEMENT | Age: 67
End: 2022-06-23

## 2022-06-23 RX ORDER — ASPIRIN 325 MG
325 TABLET ORAL 2 TIMES DAILY
Status: ON HOLD | COMMUNITY
End: 2022-07-02 | Stop reason: HOSPADM

## 2022-06-23 RX ORDER — GABAPENTIN 300 MG/1
300 CAPSULE ORAL 3 TIMES DAILY
COMMUNITY

## 2022-06-23 RX ORDER — L.ACIDOPH,PLANT/B.ANIMAL,LONG 2B CELL
CAPSULE ORAL
COMMUNITY

## 2022-06-23 NOTE — CARE COORDINATION
[facility] Transitions of Care Initial Call    2022      Patient Name: Livia Oconnor         Patient : 1955     Discharge Date: 22    RARS: Readmission Risk Score: No data recorded  PCP: Charu France MD      [facility]    Call within 2 business days of discharge: Yes    Challenges to be reviewed by the provider   Additional needs identified to be addressed with provider: No  none             Method of communication with provider : none      Advance Care Planning:   Does patient have an Advance Directive: reviewed and current, reviewed and needs to be updated, not on file; education provided, not on file, patient declined education, decision maker updated and referral to internal ACP facilitator. Was this a readmission? No    Patients top risk factors for readmission: functional physical ability    Care Transition Nurse (CTN) contacted the patient by telephone to perform post hospital discharge assessment. Verified name and  with patient as identifiers. Provided introduction to self, and explanation of the CTN role. CTN reviewed discharge instructions, medical action plan and red flags with patient who verbalized understanding. Patient given an opportunity to ask questions and does not have any further questions or concerns at this time. Were discharge instructions available to patient? Yes. Reviewed appropriate site of care based on symptoms and resources available to patient including: PCP  Specialist  When to call 911. The patient agrees to contact the Surgeons office for questions related to their healthcare. Medication reconciliation was performed with patient, who verbalizes understanding of administration of home medications. Advised obtaining a 90-day supply of all daily and as-needed medications. Reviewed and educated patient on any new and changed medications related to discharge diagnosis.                   Initial Assessment: Chava Pires, really well\". Incision: covered, dressing is dry and intact   Infection Education Completed:Yes  Fever: denies    Edema: present    Pain: Rates at a \"7\" on 0-10 pain scale-states that this is an acceptable level. Pain Control Interventions: Percocet 1 tablet every 4 hours    Calf Tenderness: denies  DVT Education Completed:Yes    SOB/CP: denies    Urinating/Bowels/Appetite: Urinating OK, BM today, eating OK  Constipation Prevention and Self Care Education Provided:Yes    Ability to care for basic needs; Shower, meal preparation: Good, states she doesn't always use her walker. Home Care/Out Patient Therapy: denies need    Medication Changes/Questions: Medications reviewed. No future appointments. Plan for follow-up call in 3-5 days based on severity of symptoms and risk factors.   Plan for next call: symptom management-assess  self management-assess

## 2022-06-28 ENCOUNTER — CARE COORDINATION (OUTPATIENT)
Dept: CASE MANAGEMENT | Age: 67
End: 2022-06-28

## 2022-06-28 NOTE — CARE COORDINATION
[facility] Transitions of Care Subsequent Call    2022      Patient Name: Lawson Christy         Patient : 1955     Discharge Date: 22    RARS: Readmission Risk Score: No data recorded  PCP: Jalil Rizzo MD      Discharge Facility: [facility] to be reviewed by the provider   Additional needs identified to be addressed with provider: Yes  (+) DVT             Method of communication with provider : phone    Care Transition Nurse (CTN) contacted the patient by telephone to perform post hospital discharge assessment. Verified name and  with patient as identifiers. Provided introduction to self, and explanation of the CTN role. Patient given an opportunity to ask questions and does not have any further questions or concerns at this time. Assessment:  Incision:  covered    Fever: denies    Edema: yes-using ice frequently    Pain Control Interventions: Percocet as directed    Calf Tenderness: yes-was at her Rheumatologist yesterday and it was discovered that she has a DVT. Dr. Mayo Parker office is aware. She called her PCP and they added Xarelto. She was also referred to a Vascular Physician who will follow up on . Call placed to Dr. Mayo Parker office and spoke to Evette who verifies the office is aware, the treatment plan is acceptable. Dr. Mayo Parker office will follow up on 22. SOB/CP: denies    Urinating/Bowels/Appetite: OK      Ability to care for basic needs; Shower, meal preparation: OK    Home Care/Out Patient Therapy: Will discuss therapy at upcoming appointment. Medication Changes/Questions: Addition of Xarelto. No future appointments. Plan for follow-up call in 5-7 days based on severity of symptoms and risk factors.   Plan for next call: symptom management-(+) DVT  self management-assess  follow up appointment-2022

## 2022-06-30 ENCOUNTER — HOSPITAL ENCOUNTER (INPATIENT)
Age: 67
LOS: 2 days | Discharge: HOME | DRG: 300 | End: 2022-07-02
Attending: EMERGENCY MEDICINE | Admitting: FAMILY MEDICINE
Payer: MEDICARE

## 2022-06-30 ENCOUNTER — APPOINTMENT (OUTPATIENT)
Dept: CT IMAGING | Age: 67
DRG: 300 | End: 2022-06-30
Payer: MEDICARE

## 2022-06-30 DIAGNOSIS — I26.94 MULTIPLE SUBSEGMENTAL PULMONARY EMBOLI WITHOUT ACUTE COR PULMONALE (HCC): Primary | ICD-10-CM

## 2022-06-30 PROBLEM — I26.99 ACUTE PULMONARY EMBOLISM (HCC): Status: ACTIVE | Noted: 2022-06-30

## 2022-06-30 LAB
ALBUMIN SERPL-MCNC: 4.3 G/DL (ref 3.5–5.2)
ALP BLD-CCNC: 90 U/L (ref 35–104)
ALT SERPL-CCNC: 16 U/L (ref 5–33)
ANION GAP SERPL CALCULATED.3IONS-SCNC: 10 MMOL/L (ref 9–17)
AST SERPL-CCNC: 22 U/L
BILIRUB SERPL-MCNC: 0.25 MG/DL (ref 0.3–1.2)
BUN BLDV-MCNC: 21 MG/DL (ref 8–23)
BUN/CREAT BLD: 19 (ref 9–20)
CALCIUM SERPL-MCNC: 9.7 MG/DL (ref 8.6–10.4)
CHLORIDE BLD-SCNC: 99 MMOL/L (ref 98–107)
CO2: 27 MMOL/L (ref 20–31)
CREAT SERPL-MCNC: 1.13 MG/DL (ref 0.5–0.9)
GFR AFRICAN AMERICAN: 58 ML/MIN
GFR NON-AFRICAN AMERICAN: 48 ML/MIN
GFR SERPL CREATININE-BSD FRML MDRD: ABNORMAL ML/MIN/{1.73_M2}
GLUCOSE BLD-MCNC: 117 MG/DL (ref 70–99)
INR BLD: 1.8
PARTIAL THROMBOPLASTIN TIME: 41.6 SEC (ref 23.9–33.8)
POTASSIUM SERPL-SCNC: 4.8 MMOL/L (ref 3.7–5.3)
PROTHROMBIN TIME: 20.7 SEC (ref 11.5–14.2)
SODIUM BLD-SCNC: 136 MMOL/L (ref 135–144)
TOTAL PROTEIN: 7.3 G/DL (ref 6.4–8.3)
TROPONIN, HIGH SENSITIVITY: 13 NG/L (ref 0–14)

## 2022-06-30 PROCEDURE — 80053 COMPREHEN METABOLIC PANEL: CPT

## 2022-06-30 PROCEDURE — 6360000004 HC RX CONTRAST MEDICATION: Performed by: PHYSICIAN ASSISTANT

## 2022-06-30 PROCEDURE — 71260 CT THORAX DX C+: CPT

## 2022-06-30 PROCEDURE — 2060000000 HC ICU INTERMEDIATE R&B

## 2022-06-30 PROCEDURE — 85730 THROMBOPLASTIN TIME PARTIAL: CPT

## 2022-06-30 PROCEDURE — 85610 PROTHROMBIN TIME: CPT

## 2022-06-30 PROCEDURE — 36415 COLL VENOUS BLD VENIPUNCTURE: CPT

## 2022-06-30 PROCEDURE — 99285 EMERGENCY DEPT VISIT HI MDM: CPT

## 2022-06-30 PROCEDURE — 2580000003 HC RX 258: Performed by: PHYSICIAN ASSISTANT

## 2022-06-30 PROCEDURE — 84484 ASSAY OF TROPONIN QUANT: CPT

## 2022-06-30 PROCEDURE — 93005 ELECTROCARDIOGRAM TRACING: CPT | Performed by: PHYSICIAN ASSISTANT

## 2022-06-30 RX ORDER — HEPARIN SODIUM 1000 [USP'U]/ML
40 INJECTION, SOLUTION INTRAVENOUS; SUBCUTANEOUS PRN
Status: DISCONTINUED | OUTPATIENT
Start: 2022-06-30 | End: 2022-07-02 | Stop reason: HOSPADM

## 2022-06-30 RX ORDER — HEPARIN SODIUM 1000 [USP'U]/ML
80 INJECTION, SOLUTION INTRAVENOUS; SUBCUTANEOUS ONCE
Status: DISCONTINUED | OUTPATIENT
Start: 2022-06-30 | End: 2022-07-01 | Stop reason: DRUGHIGH

## 2022-06-30 RX ORDER — ONDANSETRON 2 MG/ML
4 INJECTION INTRAMUSCULAR; INTRAVENOUS EVERY 6 HOURS PRN
Status: DISCONTINUED | OUTPATIENT
Start: 2022-06-30 | End: 2022-07-02 | Stop reason: HOSPADM

## 2022-06-30 RX ORDER — PANTOPRAZOLE SODIUM 40 MG/1
40 TABLET, DELAYED RELEASE ORAL
Status: DISCONTINUED | OUTPATIENT
Start: 2022-07-01 | End: 2022-07-02 | Stop reason: HOSPADM

## 2022-06-30 RX ORDER — RIVAROXABAN 15 MG/1
15 TABLET, FILM COATED ORAL 2 TIMES DAILY WITH MEALS
Status: ON HOLD | COMMUNITY
Start: 2022-06-27 | End: 2022-07-02 | Stop reason: SDUPTHER

## 2022-06-30 RX ORDER — ATORVASTATIN CALCIUM 10 MG/1
10 TABLET, FILM COATED ORAL NIGHTLY
Status: DISCONTINUED | OUTPATIENT
Start: 2022-07-01 | End: 2022-07-02 | Stop reason: HOSPADM

## 2022-06-30 RX ORDER — HEPARIN SODIUM 10000 [USP'U]/100ML
5-30 INJECTION, SOLUTION INTRAVENOUS CONTINUOUS
Status: DISCONTINUED | OUTPATIENT
Start: 2022-07-01 | End: 2022-07-02 | Stop reason: HOSPADM

## 2022-06-30 RX ORDER — ACETAMINOPHEN 325 MG/1
650 TABLET ORAL EVERY 6 HOURS PRN
Status: DISCONTINUED | OUTPATIENT
Start: 2022-06-30 | End: 2022-07-02 | Stop reason: HOSPADM

## 2022-06-30 RX ORDER — ASPIRIN 325 MG
325 TABLET ORAL DAILY
Status: DISCONTINUED | OUTPATIENT
Start: 2022-07-01 | End: 2022-07-02 | Stop reason: HOSPADM

## 2022-06-30 RX ORDER — LOSARTAN POTASSIUM 100 MG/1
100 TABLET ORAL DAILY
Status: DISCONTINUED | OUTPATIENT
Start: 2022-07-01 | End: 2022-07-02 | Stop reason: HOSPADM

## 2022-06-30 RX ORDER — POTASSIUM CHLORIDE 7.45 MG/ML
10 INJECTION INTRAVENOUS PRN
Status: DISCONTINUED | OUTPATIENT
Start: 2022-06-30 | End: 2022-07-02 | Stop reason: HOSPADM

## 2022-06-30 RX ORDER — METOPROLOL SUCCINATE 50 MG/1
200 TABLET, EXTENDED RELEASE ORAL DAILY
Status: DISCONTINUED | OUTPATIENT
Start: 2022-07-01 | End: 2022-07-02 | Stop reason: HOSPADM

## 2022-06-30 RX ORDER — HEPARIN SODIUM 1000 [USP'U]/ML
80 INJECTION, SOLUTION INTRAVENOUS; SUBCUTANEOUS PRN
Status: DISCONTINUED | OUTPATIENT
Start: 2022-06-30 | End: 2022-07-02 | Stop reason: HOSPADM

## 2022-06-30 RX ORDER — LEVOTHYROXINE SODIUM 88 UG/1
88 TABLET ORAL DAILY
Status: DISCONTINUED | OUTPATIENT
Start: 2022-07-01 | End: 2022-07-02 | Stop reason: HOSPADM

## 2022-06-30 RX ORDER — LIOTHYRONINE SODIUM 5 UG/1
5 TABLET ORAL DAILY
Status: DISCONTINUED | OUTPATIENT
Start: 2022-07-01 | End: 2022-07-02 | Stop reason: HOSPADM

## 2022-06-30 RX ORDER — DILTIAZEM HYDROCHLORIDE 120 MG/1
240 CAPSULE, COATED, EXTENDED RELEASE ORAL DAILY
Status: DISCONTINUED | OUTPATIENT
Start: 2022-07-01 | End: 2022-07-02 | Stop reason: HOSPADM

## 2022-06-30 RX ORDER — GABAPENTIN 300 MG/1
300 CAPSULE ORAL 2 TIMES DAILY
Status: DISCONTINUED | OUTPATIENT
Start: 2022-07-01 | End: 2022-07-01

## 2022-06-30 RX ORDER — POTASSIUM CHLORIDE 20 MEQ/1
40 TABLET, EXTENDED RELEASE ORAL PRN
Status: DISCONTINUED | OUTPATIENT
Start: 2022-06-30 | End: 2022-07-02 | Stop reason: HOSPADM

## 2022-06-30 RX ORDER — ACETAMINOPHEN 650 MG/1
650 SUPPOSITORY RECTAL EVERY 6 HOURS PRN
Status: DISCONTINUED | OUTPATIENT
Start: 2022-06-30 | End: 2022-07-02 | Stop reason: HOSPADM

## 2022-06-30 RX ORDER — OXYCODONE HYDROCHLORIDE AND ACETAMINOPHEN 5; 325 MG/1; MG/1
1 TABLET ORAL EVERY 4 HOURS PRN
Status: DISCONTINUED | OUTPATIENT
Start: 2022-06-30 | End: 2022-07-02 | Stop reason: HOSPADM

## 2022-06-30 RX ORDER — MAGNESIUM SULFATE 1 G/100ML
1000 INJECTION INTRAVENOUS PRN
Status: DISCONTINUED | OUTPATIENT
Start: 2022-06-30 | End: 2022-07-02 | Stop reason: HOSPADM

## 2022-06-30 RX ORDER — SODIUM CHLORIDE 0.9 % (FLUSH) 0.9 %
10 SYRINGE (ML) INJECTION PRN
Status: DISCONTINUED | OUTPATIENT
Start: 2022-06-30 | End: 2022-07-02 | Stop reason: HOSPADM

## 2022-06-30 RX ORDER — POLYETHYLENE GLYCOL 3350 17 G/17G
17 POWDER, FOR SOLUTION ORAL DAILY PRN
Status: DISCONTINUED | OUTPATIENT
Start: 2022-06-30 | End: 2022-07-02 | Stop reason: HOSPADM

## 2022-06-30 RX ORDER — 0.9 % SODIUM CHLORIDE 0.9 %
100 INTRAVENOUS SOLUTION INTRAVENOUS ONCE
Status: COMPLETED | OUTPATIENT
Start: 2022-06-30 | End: 2022-06-30

## 2022-06-30 RX ORDER — ZOLPIDEM TARTRATE 5 MG/1
5 TABLET ORAL NIGHTLY PRN
Status: DISCONTINUED | OUTPATIENT
Start: 2022-07-01 | End: 2022-07-02 | Stop reason: HOSPADM

## 2022-06-30 RX ORDER — ONDANSETRON 4 MG/1
4 TABLET, ORALLY DISINTEGRATING ORAL EVERY 8 HOURS PRN
Status: DISCONTINUED | OUTPATIENT
Start: 2022-06-30 | End: 2022-07-02 | Stop reason: HOSPADM

## 2022-06-30 RX ORDER — SODIUM CHLORIDE 9 MG/ML
INJECTION, SOLUTION INTRAVENOUS PRN
Status: DISCONTINUED | OUTPATIENT
Start: 2022-06-30 | End: 2022-07-02 | Stop reason: HOSPADM

## 2022-06-30 RX ORDER — AMITRIPTYLINE HYDROCHLORIDE 50 MG/1
50 TABLET, FILM COATED ORAL NIGHTLY PRN
Status: DISCONTINUED | OUTPATIENT
Start: 2022-07-01 | End: 2022-07-02 | Stop reason: HOSPADM

## 2022-06-30 RX ORDER — SODIUM CHLORIDE 0.9 % (FLUSH) 0.9 %
5-40 SYRINGE (ML) INJECTION EVERY 12 HOURS SCHEDULED
Status: DISCONTINUED | OUTPATIENT
Start: 2022-07-01 | End: 2022-07-02 | Stop reason: HOSPADM

## 2022-06-30 RX ADMIN — SODIUM CHLORIDE 80 ML: 9 INJECTION, SOLUTION INTRAVENOUS at 21:05

## 2022-06-30 RX ADMIN — IOPAMIDOL 75 ML: 755 INJECTION, SOLUTION INTRAVENOUS at 21:05

## 2022-06-30 RX ADMIN — SODIUM CHLORIDE, PRESERVATIVE FREE 10 ML: 5 INJECTION INTRAVENOUS at 21:05

## 2022-06-30 ASSESSMENT — PAIN SCALES - GENERAL: PAINLEVEL_OUTOF10: 7

## 2022-06-30 ASSESSMENT — PAIN DESCRIPTION - ORIENTATION: ORIENTATION: LEFT

## 2022-06-30 ASSESSMENT — PAIN - FUNCTIONAL ASSESSMENT: PAIN_FUNCTIONAL_ASSESSMENT: 0-10

## 2022-06-30 ASSESSMENT — PAIN DESCRIPTION - LOCATION: LOCATION: LEG

## 2022-06-30 NOTE — ED PROVIDER NOTES
[facility] ED  eMERGENCY dEPARTMENT eNCOUnter      Pt Name: Alicja Alvarenga  MRN: [de-identified]  [place] 1955  Date of evaluation: 6/30/2022  Provider: Marcus Plascencia Dr       Chief Complaint   Patient presents with    Leg Pain     had knee surgery on 6/20. had doppler on 6/27 which showed blood clot in left leg. started on xarelto. HISTORY OF PRESENT ILLNESS  (Location/Symptom, Timing/Onset, Context/Setting, Quality, Duration, Modifying Factors, Severity.)   Alicja Alvarenga is a 79 y.o. female who presents to the emergency department via private car. Patient states that she had left knee surgery with Dr Kristina Garcia on 6/20/22. Patient states that she developed left calf pain after potter. She called his office and was told that that was normal and he put her on gabapentin. She continued to have calf pain and saw her rheumatologist. An ultrasound was ordered and the patient was found to have a DVT below the left knee. She was placed on Xarleto and called Dr Kristina Garcia office for instructions. She was told that he does not handle that, her rheumatologist put her on Xarelto, and she called her PCP and was told that they do not handle that either. She has an appt scheduled with Dr Cheryle Banks in vascular on 7/18/2022. She continued to have some pain in the left calf and felt that she did not have good instructions and was urged by two nurses to come to the ER to be seen. Patient denies any shortness of breath, chest pain or other symptoms. Nursing Notes were reviewed.     ALLERGIES     Amoxicillin    CURRENT MEDICATIONS       Previous Medications    AMITRIPTYLINE (ELAVIL) 75 MG TABLET    1 tablet at bedtime    ASPIRIN 325 MG TABLET    Take 325 mg by mouth 2 times daily 60 tablets then discontinue    COENZYME Q10 200 MG CAPS CAPSULE    Take 1 capsule by mouth daily    DENOSUMAB (PROLIA) 60 MG/ML SOSY SC INJECTION    Inject 60 mg into the skin once January and July    SARAH MONTGOMERY [facility]) 240 MG EXTENDED RELEASE CAPSULE    Take 240 mg by mouth daily    FISH OIL-OMEGA-3 FATTY ACIDS 1000 MG CAPSULE    Take 2 g by mouth 2 times daily    FUROSEMIDE (LASIX) 20 MG TABLET    Take 20 mg by mouth every 48 hours    GABAPENTIN (NEURONTIN) 100 MG CAPSULE        LEVOTHYROXINE (SYNTHROID) 88 MCG TABLET    Take 88 mcg by mouth Daily     LIOTHYRONINE (CYTOMEL) 5 MCG TABLET    Take 5 mcg by mouth daily     LOSARTAN (COZAAR) 100 MG TABLET    Take 1 tablet by mouth daily    METOPROLOL SUCCINATE (TOPROL XL) 200 MG EXTENDED RELEASE TABLET    Take 200 mg by mouth daily     MULTIPLE VITAMINS-MINERALS (MULTIVITAMIN ADULTS PO)    Take 1 tablet by mouth daily    OMEGA-3 1000 MG CAPS    Take 2,000 mg by mouth daily    OMEPRAZOLE (PRILOSEC) 40 MG DELAYED RELEASE CAPSULE    Take 40 mg by mouth daily     OXYCODONE-ACETAMINOPHEN (PERCOCET) 5-325 MG PER TABLET    Take 1 tablet by mouth every 4 hours as needed. POTASSIUM CHLORIDE (KLOR-CON) 10 MEQ EXTENDED RELEASE TABLET    Take 10 mEq by mouth as needed With lasix    PROBIOTIC PRODUCT (PROBIOTIC ACIDOPHILUS BEADS) CAPS    Take by mouth    RIZATRIPTAN (MAXALT) 5 MG TABLET    Take 5 mg by mouth once as needed     SIMVASTATIN (ZOCOR) 10 MG TABLET    Take 10 mg by mouth nightly     TIZANIDINE (ZANAFLEX) 4 MG TABLET    tizanidine 4 mg tablet    XARELTO 15 MG TABS TABLET        ZOLPIDEM (AMBIEN CR) 6.25 MG EXTENDED RELEASE TABLET    Take 1 tablet by mouth daily.      PAST MEDICAL HISTORY         Diagnosis Date    Arthritis     CAD (coronary artery disease)     CHF (congestive heart failure) ([facility] [address]     pt has not been diagnosed however on medications for her heart and a diuretic, assumes CHF    CPAP (continuous positive airway pressure) dependence     DJD (degenerative joint disease)     left ankle, left knee, rght knee    Fibromyalgia     GERD (gastroesophageal reflux disease)     Hyperlipidemia     Hypertension     Mitral valve regurgitation     Seasonal allergies     Sleep apnea     Spinal stenosis     Thyroid disease     hashimotos     SURGICAL HISTORY           Procedure Laterality Date    ANKLE SURGERY Left     x3    BREAST BIOPSY Right     DENTAL SURGERY      pt has permanant implanted dentures    HYSTERECTOMY (CERVIX STATUS UNKNOWN)      KNEE ARTHROSCOPY Left     LYMPHADENECTOMY Right     axilla, not cancer    PAIN MANAGEMENT PROCEDURE      back ablations, last @/2022    TOTAL KNEE ARTHROPLASTY Left 6/20/2022    LEFT KNEE TOTAL ARTHROPLASTY - CONFORMIS performed by Aminta Alcaraz MD at [facility]     No family history on file. SOCIAL HISTORY      reports that she has never smoked. She has never used smokeless tobacco. She reports that she does not drink alcohol and does not use drugs. REVIEW OF SYSTEMS    (2-9 systems for level 4, 10 or more for level 5)     Constitutional: Denies recent fever, chills, weakness. Visual: Denies recent change in vision, discharge or pain. ENT: Denies recent sore throat, nasal congestion, ear pain. Respiratory: Denies recent shortness of breath,  cough , wheezing or pleuritic chest pain. Cardiac:  Denies recent chest pain palpitations dyspnea on exertion + swelling in the lower extremities. GI: denies any recent abdominal pain nausea vomiting or diarrhea. : denies any recent difficulty urinating or pain in the genitals. Musculoskeletal :  Denies neck pain back pain joint pain or recent trauma. Neurologic: denies any seizure activity headaches stroke like symptoms or syncope. Skin:  Denies any recent new rash itching or change in skin color. Psychiatric:  Denies any thoughts of suicide homicide. Patient does not voice any problems with anxiety or depression    Except as noted above the remainder of the review of systems was reviewed and negative.      PHYSICAL EXAM    (up to 7 for level 4, 8 or more for level 5)     ED Triage Vitals [06/30/22 1931]   BP Temp Temp Source Heart Rate Resp SpO2 Height Weight   (!) 153/76 98.3 °F (36.8 °C) Oral (!) 108 18 98 % 5' 5\" (1.651 m) 225 lb (102.1 kg)       Physical Exam  Vitals and nursing note reviewed. Constitutional:       Appearance: Normal appearance. She is normal weight. HENT:      Head: Normocephalic and atraumatic. Nose: Nose normal.      Mouth/Throat:      Mouth: Mucous membranes are moist.      Pharynx: Oropharynx is clear. Eyes:      Extraocular Movements: Extraocular movements intact. Conjunctiva/sclera: Conjunctivae normal.      Pupils: Pupils are equal, round, and reactive to light. Cardiovascular:      Rate and Rhythm: Normal rate and regular rhythm. Pulses: Normal pulses. Heart sounds: Normal heart sounds. Pulmonary:      Effort: Pulmonary effort is normal.      Breath sounds: Normal breath sounds. Abdominal:      General: Abdomen is flat. Bowel sounds are normal.   Musculoskeletal:         General: Normal range of motion. Cervical back: Normal range of motion and neck supple. Skin:     General: Skin is warm. Neurological:      General: No focal deficit present. Mental Status: She is alert and oriented to person, place, and time. Psychiatric:         Mood and Affect: Mood normal.         Behavior: Behavior normal.       DIAGNOSTIC RESULTS     EKG: All EKG's are interpreted by the Emergency Department Physician who either signs or Co-signs this chart in the absence of a cardiologist.    Please see the documentation by the ER attending.      RADIOLOGY:   Non-plain film images such as CT, Ultrasound and MRI are read by the radiologist. Plain radiographic images are visualized and preliminarily interpreted by the emergency physician with the below findings:    Interpretation per the Radiologist below, if available at the time of this note:    CT CHEST PULMONARY EMBOLISM W CONTRAST    Result Date: 6/30/2022  EXAMINATION: CTA OF THE CHEST 6/30/2022 8:54 pm TECHNIQUE: CTA of the chest was performed after the administration of intravenous contrast.  Multiplanar reformatted images are provided for review. MIP images are provided for review. Automated exposure control, iterative reconstruction, and/or weight based adjustment of the mA/kV was utilized to reduce the radiation dose to as low as reasonably achievable. COMPARISON: None HISTORY: ORDERING SYSTEM PROVIDED HISTORY: r/o Pulmonary Embolism TECHNOLOGIST PROVIDED HISTORY: r/o Pulmonary Embolism Decision Support Exception - unselect if not a suspected or confirmed emergency medical condition->Emergency Medical Condition (MA) Reason for Exam: Pt c/o left leg pain and swelling, pt states she has a blood clot in her leg. Pt had a recent left TKA. FINDINGS: Pulmonary Arteries: Pulmonary arteries are adequately opacified for evaluation. Subsegmental filling defects are noted within the right upper lobe and left lower lobes. No other proximal pulmonary embolus. Main pulmonary artery is normal in caliber. The RV/LV ratio measures 1.0 cm. Mediastinum: The thoracic aorta is normal caliber with mild atherosclerosis. The heart size within normal limits. No pericardial effusion. The esophagus is unremarkable. No pathologically enlarged adenopathy. Lungs/pleura: Biapical scarring. Mild mosaic attenuation. No focal consolidation, pleural effusion or pneumothorax. The central airways are otherwise patent. Upper Abdomen: Small hiatal hernia. Soft Tissues/Bones: No acute bone or soft tissue abnormality. 1. Few scattered subsegmental pulmonary emboli are noted predominately within the right upper lobe and left lower lobe. 2. No other acute cardiopulmonary findings. Critical results were called by Dr. Eleonora Luz. Marisela Pete MD to Jennie Soares CNP on 6/30/2022 at 21:40.        LABS:  Labs Reviewed   COMPREHENSIVE METABOLIC PANEL W/ REFLEX TO MG FOR LOW K - Abnormal; Notable for the following components:       Result Value    Glucose 117 (*)     CREATININE 1.13 (*)     Total Bilirubin 0.25 (*)     GFR Non- 48 (*)     GFR  58 (*)     All other components within normal limits   PROTIME-INR - Abnormal; Notable for the following components:    Protime 20.7 (*)     All other components within normal limits   TROPONIN   CBC   APTT   PROTIME-INR   ANTI-XA, UNFRACTIONATED HEPARIN   ANTI-XA, UNFRACTIONATED HEPARIN   APTT       All other labs were within normal range or not returned as of this dictation. EMERGENCY DEPARTMENT COURSE and DIFFERENTIAL DIAGNOSIS/MDM:   Vitals:    Vitals:    06/30/22 2100 06/30/22 2115 06/30/22 2145 06/30/22 2200   BP: 137/69 128/63 127/74 128/69   Pulse: 84 85 84 87   Resp: 16 22 18 20   Temp:       TempSrc:       SpO2: 97% 98% 97% 100%   Weight:       Height:         Discussed with the patient the expectations with DVT, that the pain may remain for a couple of weeks to month. Patient is on the appropriate medication and that she has the appropriate follow up. Patient was more concerned that she did not have any specific instructions or follow up. Walked through and answered all of the patients questions. Patient is tachycardic. She states that she she is just nervous being here. Discussed that since the patient is tachycardic with the clot that we will go ahead and get an CTA of the chest to verify that she has not had any movement of her clot. 2139- Called from the radiologist and the patient does have three small subsegmental blood clots. 2154- Discussed with Dr Deborah Goodman in [facility] and we should admit the patient for IV heparin, echo in the am. He will consult. Will call the admitting service.      2003- Discussed with patient that the results are positive and that we are going to keep her overnight and get an echo in the am.     [address] ED:  Medications   sodium chloride flush 0.9 % injection 10 mL (10 mLs IntraVENous Given 6/30/22 2105)   heparin (porcine) injection 8,170 Units (has no administration in time range)   heparin (porcine) injection 8,170 Units (has no administration in time range)   heparin (porcine) injection 4,080 Units (has no administration in time range)   heparin 25,000 units in dextrose 5% 250 mL (premix) infusion (has no administration in time range)   0.9 % sodium chloride bolus (0 mLs IntraVENous Stopped 6/30/22 2135)   iopamidol (ISOVUE-370) 76 % injection 75 mL (75 mLs IntraVENous Given 6/30/22 2105)       CLINICAL DECISION MAKING:  The patient presented alert with a nontoxic appearance and was seen in conjunction with Dr. Tara Cleaning. Evaluation and treatment course in the ED, and plan of care upon discharge was discussed in length with the patient. Patient had no further questions prior to being discharged and was instructed to return to the ED for new or worsening symptoms. Care was provided during an unprecedented national emergency due to the novel coronavirus, Covid-19. CONSULTS:  IP CONSULT TO PULMONOLOGY  IP CONSULT TO HOSPITALIST    PROCEDURES:  None    FINAL IMPRESSION      1.  Multiple subsegmental pulmonary emboli without acute cor pulmonale (HCC)          Problem List  Patient Active Problem List   Diagnosis Code    S/P total knee arthroplasty, left U82.800    Primary osteoarthritis of left knee M17.12    Acute pulmonary embolism ([facility] [address] I26.99     DISPOSITION/PLAN   DISPOSITION  Admitted     (Please note that portions of this note were completed with a voice recognition program.  Efforts were made to edit the dictations but occasionally words are mis-transcribed.)    BRITTANY Jhaveri PA-C  06/30/22 9232

## 2022-07-01 LAB
ABSOLUTE EOS #: 0.17 K/UL (ref 0–0.44)
ABSOLUTE IMMATURE GRANULOCYTE: 0.08 K/UL (ref 0–0.3)
ABSOLUTE LYMPH #: 2.75 K/UL (ref 1.1–3.7)
ABSOLUTE MONO #: 1.31 K/UL (ref 0.1–1.2)
ANION GAP SERPL CALCULATED.3IONS-SCNC: 10 MMOL/L (ref 9–17)
BASOPHILS # BLD: 1 % (ref 0–2)
BASOPHILS ABSOLUTE: 0.08 K/UL (ref 0–0.2)
BUN BLDV-MCNC: 18 MG/DL (ref 8–23)
BUN/CREAT BLD: 18 (ref 9–20)
CALCIUM SERPL-MCNC: 9.4 MG/DL (ref 8.6–10.4)
CHLORIDE BLD-SCNC: 100 MMOL/L (ref 98–107)
CO2: 27 MMOL/L (ref 20–31)
CREAT SERPL-MCNC: 0.99 MG/DL (ref 0.5–0.9)
EKG ATRIAL RATE: 93 BPM
EKG P AXIS: 46 DEGREES
EKG P-R INTERVAL: 166 MS
EKG Q-T INTERVAL: 334 MS
EKG QRS DURATION: 76 MS
EKG QTC CALCULATION (BAZETT): 415 MS
EKG R AXIS: -5 DEGREES
EKG T AXIS: 8 DEGREES
EKG VENTRICULAR RATE: 93 BPM
EOSINOPHILS RELATIVE PERCENT: 1 % (ref 1–4)
GFR AFRICAN AMERICAN: >60 ML/MIN
GFR NON-AFRICAN AMERICAN: 56 ML/MIN
GFR SERPL CREATININE-BSD FRML MDRD: ABNORMAL ML/MIN/{1.73_M2}
GLUCOSE BLD-MCNC: 135 MG/DL (ref 70–99)
HCT VFR BLD CALC: 32.9 % (ref 36.3–47.1)
HEMOGLOBIN: 10.3 G/DL (ref 11.9–15.1)
IMMATURE GRANULOCYTES: 1 %
INR BLD: 1.5
LV EF: 65 %
LVEF MODALITY: NORMAL
LYMPHOCYTES # BLD: 21 % (ref 24–43)
MCH RBC QN AUTO: 30.9 PG (ref 25.2–33.5)
MCHC RBC AUTO-ENTMCNC: 31.3 G/DL (ref 28.4–34.8)
MCV RBC AUTO: 98.8 FL (ref 82.6–102.9)
MONOCYTES # BLD: 10 % (ref 3–12)
NRBC AUTOMATED: 0 PER 100 WBC
PARTIAL THROMBOPLASTIN TIME: 138 SEC (ref 23.9–33.8)
PARTIAL THROMBOPLASTIN TIME: 80.4 SEC (ref 23.9–33.8)
PDW BLD-RTO: 12.9 % (ref 11.8–14.4)
PLATELET # BLD: 394 K/UL (ref 138–453)
PMV BLD AUTO: 9 FL (ref 8.1–13.5)
POTASSIUM SERPL-SCNC: 3.9 MMOL/L (ref 3.7–5.3)
PROTHROMBIN TIME: 18 SEC (ref 11.5–14.2)
RBC # BLD: 3.33 M/UL (ref 3.95–5.11)
SEG NEUTROPHILS: 66 % (ref 36–65)
SEGMENTED NEUTROPHILS ABSOLUTE COUNT: 8.53 K/UL (ref 1.5–8.1)
SODIUM BLD-SCNC: 137 MMOL/L (ref 135–144)
WBC # BLD: 12.9 K/UL (ref 3.5–11.3)

## 2022-07-01 PROCEDURE — 80048 BASIC METABOLIC PNL TOTAL CA: CPT

## 2022-07-01 PROCEDURE — 36415 COLL VENOUS BLD VENIPUNCTURE: CPT

## 2022-07-01 PROCEDURE — 6360000002 HC RX W HCPCS: Performed by: PHYSICIAN ASSISTANT

## 2022-07-01 PROCEDURE — 6360000002 HC RX W HCPCS: Performed by: FAMILY MEDICINE

## 2022-07-01 PROCEDURE — 85610 PROTHROMBIN TIME: CPT

## 2022-07-01 PROCEDURE — 2580000003 HC RX 258: Performed by: FAMILY MEDICINE

## 2022-07-01 PROCEDURE — 93306 TTE W/DOPPLER COMPLETE: CPT

## 2022-07-01 PROCEDURE — 2060000000 HC ICU INTERMEDIATE R&B

## 2022-07-01 PROCEDURE — 6370000000 HC RX 637 (ALT 250 FOR IP): Performed by: FAMILY MEDICINE

## 2022-07-01 PROCEDURE — 85025 COMPLETE CBC W/AUTO DIFF WBC: CPT

## 2022-07-01 PROCEDURE — 85730 THROMBOPLASTIN TIME PARTIAL: CPT

## 2022-07-01 RX ORDER — TIZANIDINE 4 MG/1
4 TABLET ORAL NIGHTLY
Status: DISCONTINUED | OUTPATIENT
Start: 2022-07-01 | End: 2022-07-02 | Stop reason: HOSPADM

## 2022-07-01 RX ORDER — OMEGA-3S/DHA/EPA/FISH OIL/D3 300MG-1000
400 CAPSULE ORAL DAILY
COMMUNITY

## 2022-07-01 RX ORDER — MORPHINE SULFATE 4 MG/ML
4 INJECTION, SOLUTION INTRAMUSCULAR; INTRAVENOUS EVERY 4 HOURS PRN
Status: DISCONTINUED | OUTPATIENT
Start: 2022-07-01 | End: 2022-07-02 | Stop reason: HOSPADM

## 2022-07-01 RX ORDER — GABAPENTIN 300 MG/1
300 CAPSULE ORAL 3 TIMES DAILY
Status: DISCONTINUED | OUTPATIENT
Start: 2022-07-01 | End: 2022-07-02 | Stop reason: HOSPADM

## 2022-07-01 RX ADMIN — OXYCODONE AND ACETAMINOPHEN 1 TABLET: 5; 325 TABLET ORAL at 22:06

## 2022-07-01 RX ADMIN — ATORVASTATIN CALCIUM 10 MG: 10 TABLET, FILM COATED ORAL at 22:06

## 2022-07-01 RX ADMIN — GABAPENTIN 300 MG: 300 CAPSULE ORAL at 09:22

## 2022-07-01 RX ADMIN — ZOLPIDEM TARTRATE 5 MG: 5 TABLET ORAL at 22:06

## 2022-07-01 RX ADMIN — AMITRIPTYLINE HYDROCHLORIDE 50 MG: 50 TABLET, FILM COATED ORAL at 22:06

## 2022-07-01 RX ADMIN — PANTOPRAZOLE SODIUM 40 MG: 40 TABLET, DELAYED RELEASE ORAL at 06:26

## 2022-07-01 RX ADMIN — GABAPENTIN 300 MG: 300 CAPSULE ORAL at 22:06

## 2022-07-01 RX ADMIN — ZOLPIDEM TARTRATE 5 MG: 5 TABLET ORAL at 00:12

## 2022-07-01 RX ADMIN — LEVOTHYROXINE SODIUM 88 MCG: 0.09 TABLET ORAL at 06:26

## 2022-07-01 RX ADMIN — HEPARIN SODIUM AND DEXTROSE 15 UNITS/KG/HR: 10000; 5 INJECTION INTRAVENOUS at 22:12

## 2022-07-01 RX ADMIN — LIOTHYRONINE SODIUM 5 MCG: 5 TABLET ORAL at 09:23

## 2022-07-01 RX ADMIN — OXYCODONE AND ACETAMINOPHEN 1 TABLET: 5; 325 TABLET ORAL at 11:01

## 2022-07-01 RX ADMIN — OXYCODONE AND ACETAMINOPHEN 1 TABLET: 5; 325 TABLET ORAL at 03:21

## 2022-07-01 RX ADMIN — ACETAMINOPHEN 650 MG: 325 TABLET ORAL at 02:57

## 2022-07-01 RX ADMIN — METOPROLOL SUCCINATE 200 MG: 50 TABLET, EXTENDED RELEASE ORAL at 09:22

## 2022-07-01 RX ADMIN — LOSARTAN POTASSIUM 100 MG: 100 TABLET, FILM COATED ORAL at 09:23

## 2022-07-01 RX ADMIN — DILTIAZEM HYDROCHLORIDE 240 MG: 120 CAPSULE, COATED, EXTENDED RELEASE ORAL at 09:23

## 2022-07-01 RX ADMIN — MORPHINE SULFATE 4 MG: 4 INJECTION, SOLUTION INTRAMUSCULAR; INTRAVENOUS at 07:16

## 2022-07-01 RX ADMIN — ASPIRIN 325 MG: 325 TABLET ORAL at 09:23

## 2022-07-01 RX ADMIN — OXYCODONE AND ACETAMINOPHEN 1 TABLET: 5; 325 TABLET ORAL at 00:13

## 2022-07-01 RX ADMIN — HEPARIN SODIUM AND DEXTROSE 18 UNITS/KG/HR: 10000; 5 INJECTION INTRAVENOUS at 06:26

## 2022-07-01 RX ADMIN — TIZANIDINE 4 MG: 4 TABLET ORAL at 22:06

## 2022-07-01 RX ADMIN — SODIUM CHLORIDE: 9 INJECTION, SOLUTION INTRAVENOUS at 06:21

## 2022-07-01 ASSESSMENT — PAIN SCALES - GENERAL
PAINLEVEL_OUTOF10: 8
PAINLEVEL_OUTOF10: 7
PAINLEVEL_OUTOF10: 8
PAINLEVEL_OUTOF10: 8
PAINLEVEL_OUTOF10: 5
PAINLEVEL_OUTOF10: 9
PAINLEVEL_OUTOF10: 7

## 2022-07-01 ASSESSMENT — PAIN DESCRIPTION - LOCATION
LOCATION: LEG

## 2022-07-01 ASSESSMENT — PAIN DESCRIPTION - DESCRIPTORS: DESCRIPTORS: CRAMPING

## 2022-07-01 ASSESSMENT — PAIN DESCRIPTION - ORIENTATION
ORIENTATION: POSTERIOR
ORIENTATION: LEFT
ORIENTATION: LEFT

## 2022-07-01 ASSESSMENT — PAIN - FUNCTIONAL ASSESSMENT: PAIN_FUNCTIONAL_ASSESSMENT: ACTIVITIES ARE NOT PREVENTED

## 2022-07-01 NOTE — ED NOTES
Pt ambulatory to restroom with cane. Pt tolerated well, denied any pain.       Kelsey Chavez RN  06/30/22 7229

## 2022-07-01 NOTE — PROGRESS NOTES
Pt admitted to room 1008-2. Bed in lowest position, call light explained, all questions answered. Admission assessment completed. Waiting for clarification on starting heparin drip as patient had taken Xarelto earlier today at 5pm.Pt in 8/10 pain in L knee, 5mg of Oxycodone administered.  to bring in patient's home CPAP.  WCM

## 2022-07-01 NOTE — H&P
History & Physical  [facility].,    Adult Hospitalist      Name: Clary An  MRN: [de-identified]     Acct: [de-identified]  Room: [facility]/    Admit Date: 6/30/2022  7:39 PM  PCP: Flako Esparza MD    Primary Problem  Principal Problem:    Acute pulmonary embolism [facility])  Resolved Problems:    * No resolved hospital problems. *        Assesment:     · Acute pulmonary embolism  · Left leg deep vein thrombosis  · Status post left knee total arthroplasty dated 6/20/2022  · Essential hypertension  · Hypothyroidism  · Mixed hyperlipidemia  · Osteoporosis  · Gastroesophageal reflux disease without esophagitis  · Insomnia  · Obesity with BMI 37.4        Plan:     · Admit to progressive  · Monitor vitals closely  · Keep SPO2 above 90%  · I's and O's  · IV fluids  · Pain control  · Antiemetics as needed  · Heparin infusion  · Resume essential home medication  · Add parameters with a higher BP range  · Ace wrap left leg  · Anti-Xa   · CBC, BMP  · DVT and GI prophylaxis. Chief Complaint:     Chief Complaint   Patient presents with    Leg Pain     had knee surgery on 6/20. had doppler on 6/27 which showed blood clot in left leg. started on xarelto. History of Present Illness:      Clary An is a 79 y.o.  female who presents with Leg Pain (had knee surgery on 6/20. had doppler on 6/27 which showed blood clot in left leg. started on xarelto.)    Patient admitted from the emergency room where she presented with complaints of pain in her left leg. Patient underwent left total knee arthroplasty on 6/20/2022 and later developed a left DVT. She was started on Xarelto on 6/27/2022. Patient says for the last several days she has been having more pain in her left leg. She also complained of some dizziness on bending over though denied having any chest pain, cough or wheezing. CTA chest showed 3 subsegmental pulmonary emboli. Patient denies any significant dyspnea or orthopnea. Denies fever or chills.     Patient complains of a continuous cramp in her left calf since for several days now. Patient says she has been wearing elastic stockings continuously since surgery. She has seen a vascular surgeon and was being monitored on Xarelto. She states she took her last dose with dinner around 5 PM.    Denies headache, blurred vision or neck pain. Denies back pain or dysuria. Denies vomiting, diarrhea or constipation    I have personally reviewed the past medical history, past surgical history, medications, social history, and family history, and summarized in the note. Review of Systems:     All 10 point system is reviewed and negative otherwise mentioned in HPI. Past Medical History:     Past Medical History:   Diagnosis Date    Arthritis     CAD (coronary artery disease)     CHF (congestive heart failure) ([facility] [address]     pt has not been diagnosed however on medications for her heart and a diuretic, assumes CHF    CPAP (continuous positive airway pressure) dependence     DJD (degenerative joint disease)     left ankle, left knee, rght knee    Fibromyalgia     GERD (gastroesophageal reflux disease)     Hyperlipidemia     Hypertension     Mitral valve regurgitation     Seasonal allergies     Sleep apnea     Spinal stenosis     Thyroid disease     hashimotos        Past Surgical History:     Past Surgical History:   Procedure Laterality Date    ANKLE SURGERY Left     x3    BREAST BIOPSY Right     DENTAL SURGERY      pt has permanant implanted dentures    HYSTERECTOMY (CERVIX STATUS UNKNOWN)      KNEE ARTHROSCOPY Left     LYMPHADENECTOMY Right     axilla, not cancer    PAIN MANAGEMENT PROCEDURE      back ablations, last @/2022    TOTAL KNEE ARTHROPLASTY Left 6/20/2022    LEFT KNEE TOTAL ARTHROPLASTY - CONFORMIS performed by Whitney Rosenbaum MD at [facility]        Medications Prior to Admission:       Prior to Admission medications    Medication Sig Start Date End Date Taking?  Authorizing Provider   Lance Caceres 15 MG TABS tablet  6/27/22   Historical Provider, MD   Probiotic Product (PROBIOTIC ACIDOPHILUS BEADS) CAPS Take by mouth    Historical Provider, MD   gabapentin (NEURONTIN) 100 MG capsule     Historical Provider, MD   aspirin 325 MG tablet Take 325 mg by mouth 2 times daily 60 tablets then discontinue    Historical Provider, MD   amitriptyline (ELAVIL) 75 MG tablet 1 tablet at bedtime    Historical Provider, MD   tiZANidine (ZANAFLEX) 4 MG tablet tizanidine 4 mg tablet    Historical Provider, MD   fish oil-omega-3 fatty acids 1000 MG capsule Take 2 g by mouth 2 times daily  Patient not taking: Reported on 6/23/2022    Historical Provider, MD   coenzyme Q10 200 MG CAPS capsule Take 1 capsule by mouth daily 6/6/20   Historical Provider, MD   dilTIAZem MONTGOMERY [facility]) 240 MG extended release capsule Take 240 mg by mouth daily 8/2/21   Historical Provider, MD   furosemide (LASIX) 20 MG tablet Take 20 mg by mouth every 48 hours 12/3/21   Historical Provider, MD   liothyronine (CYTOMEL) 5 MCG tablet Take 5 mcg by mouth daily  4/24/13   Historical Provider, MD   losartan (COZAAR) 100 MG tablet Take 1 tablet by mouth daily 12/13/21   Historical Provider, MD   metoprolol succinate (TOPROL XL) 200 MG extended release tablet Take 200 mg by mouth daily  5/18/21   Historical Provider, MD   oxyCODONE-acetaminophen (PERCOCET) 5-325 MG per tablet Take 1 tablet by mouth every 4 hours as needed.   12/3/18   Historical Provider, MD   potassium chloride (KLOR-CON) 10 MEQ extended release tablet Take 10 mEq by mouth as needed With lasix    Historical Provider, MD   omeprazole (PRILOSEC) 40 MG delayed release capsule Take 40 mg by mouth daily  4/24/13   Historical Provider, MD   rizatriptan (MAXALT) 5 MG tablet Take 5 mg by mouth once as needed  5/29/20   Historical Provider, MD   simvastatin (ZOCOR) 10 MG tablet Take 10 mg by mouth nightly  5/17/21   Historical Provider, MD   levothyroxine (SYNTHROID) 88 MCG tablet Take 88 mcg by mouth Daily 13   Historical Provider, MD   zolpidem (AMBIEN CR) 6.25 MG extended release tablet Take 1 tablet by mouth daily. 21   Historical Provider, MD   Multiple Vitamins-Minerals (MULTIVITAMIN ADULTS PO) Take 1 tablet by mouth daily    Historical Provider, MD   [place]-3 1000 MG CAPS Take 2,000 mg by mouth daily  Patient not taking: Reported on 2022    Historical Provider, MD   denosumab (PROLIA) 60 MG/ML SOSY SC injection Inject 60 mg into the skin once January and July    Historical Provider, MD        Allergies:       Amoxicillin    Social History:     Tobacco:    reports that she has never smoked. She has never used smokeless tobacco.  Alcohol:      reports no history of alcohol use. Drug Use:  reports no history of drug use. Family History:     No family history on file. Physical Exam:     Vitals:  /74   Pulse 84   Temp 98.3 °F (36.8 °C) (Oral)   Resp 18   Ht 5' 5\" (1.651 m)   Wt 225 lb (102.1 kg)   SpO2 97%   BMI 37.44 kg/m²   Temp (24hrs), Av.3 °F (36.8 °C), Min:98.3 °F (36.8 °C), Max:98.3 °F (36.8 °C)      General appearance - alert, well appearing, and in no acute distress  Mental status - oriented to person, place, and time with normal affect  Head - normocephalic and atraumatic  Eyes - pupils equal and reactive, extraocular eye movements intact, conjunctiva clear  Ears - hearing appears to be intact  Nose - no drainage noted  Mouth - mucous membranes moist  Neck - supple, no carotid bruits, thyroid not palpable  Chest - clear to auscultation, normal effort  Heart - normal rate, regular rhythm, no murmur  Abdomen - soft, nontender, nondistended, bowel sounds present all four quadrants, no masses, hepatomegaly or splenomegaly  Neurological - normal speech, no focal findings or movement disorder noted, cranial nerves II through XII grossly intact  Extremities - peripheral pulses palpable, left leg edema. Left calf tenderness.   Mae ' sign  Skin - no gross lesions, rashes, on 6/30/2022 at 10:06 PM     Copy sent to Dr. Marcos Holt MD    This note was created with the assistance of a speech-recognition program.  Although the intention is to generate a document that actually reflects the content of the visit, no guarantees can be provided that every mistake has been identified and corrected by editing. Note was updated later by me after  physical examination and  completion of the assessment.

## 2022-07-01 NOTE — ED PROVIDER NOTES
eMERGENCY dEPARTMENT eNCOUnter   Independent Attestation     Pt Name: Cderic Pollack  MRN: [de-identified]  [place] 1955  Date of evaluation: 6/30/22     Cedric Pollack is a 79 y.o. female with CC: Leg Pain (had knee surgery on 6/20. had doppler on 6/27 which showed blood clot in left leg. started on xarelto.)      This visit was performed by both a physician and an APC. I performed all aspects of the MDM as documented. The care is provided during an unprecedented national emergency due to the novel coronavirus, COVID 19.     Antonio Saldana DO  Attending Emergency Physician                    Michele Rashid DO  06/30/22 5225

## 2022-07-01 NOTE — FLOWSHEET NOTE
Patient was sitting up in bed visiting with family members who were present (X's 3). Patient engaged in a brief conversation and states she's doing well. Patient has a calm, peaceful, and positive attitude, smile, and spirit. At this time the patient has no immediate needs or request, and is thankful for the visit. Writer was able to leave a prayer card as additional spiritual support. Writer stated he will pray for continued comfort and rest during her stay,    Spiritual care will follow up as needed or requested. 07/01/22 0386   Encounter Summary   Service Provided For: Patient   Referral/Consult From: [facility] Spouse; Children;Family members   Last Encounter  07/01/22   Complexity of Encounter Low   Begin Time 1500   End Time  1515   Total Time Calculated 15 min   Encounter    Type Initial Screen/Assessment   Assessment/Intervention/Outcome   Assessment Calm;Peaceful   Intervention Active listening;Nurtured Hope;Read/Provided Scripture;Sustaining Presence/Ministry of presence   Outcome Expressed Gratitude;Engaged in conversation   Plan and Referrals   Plan/Referrals Continue to visit, (comment)

## 2022-07-01 NOTE — ED NOTES
Pharmacy to hold heparin bolus and heparin drip at this time. Pt took Xarelto at approximately 1700 today. Pharmacy to call provider.       Leonard Jasso RN  06/30/22 [id number]

## 2022-07-01 NOTE — ED NOTES
ED to inpatient nurses report     Chief Complaint   Patient presents with    Leg Pain     had knee surgery on 6/20. had doppler on 6/27 which showed blood clot in left leg. started on xarelto. Present to ED from home with c/o left lower leg pain. Pt had knee surgery on 6/20. Pt had doppler performed on 6/27, blood clots were found in left leg. CT of chest was performed, results showed scattered subsegmental PE in the right upper lobe and left lower lobe. Pt denies SOB or chest pain. Pt is 99% on RA. Pt has been wearing compression stockings accordingly. LOC: alert and orientated to name, place, date  Vital signs   Vitals:    06/30/22 2100 06/30/22 2115 06/30/22 2145 06/30/22 2200   BP: 137/69 128/63 127/74 128/69   Pulse: 84 85 84 87   Resp: 16 22 18 20   Temp:       TempSrc:       SpO2: 97% 98% 97% 100%   Weight:       Height:          Oxygen Baseline RA    Current needs required RA   LDAs:   Peripheral IV 06/30/22 Left Antecubital (Active)   Site Assessment Clean, dry & intact 06/30/22 2016   Line Status Blood return noted;Normal saline locked;Specimen collected; Flushed 06/30/22 2016     Mobility: Requires assistance * 1  Fall Risk:  High  Pending ED orders: Heparin bolus and heparin drip. Pharmacy contacting Dr. Maru David about order.    Present condition: Stable  Code Status: Full  Consults: IP CONSULT TO PULMONOLOGY  IP CONSULT TO HOSPITALIST  [x]  Hospitalist  Completed  [x] yes [] no Who: Adil  []  Medicine  Completed  [] yes [] No Who:   []  Cardiology  Completed  [] yes [] No Who:   []  GI   Completed  [] yes [] No Who:   []  Neurology  Completed  [] yes [] No Who:   []  Nephrology Completed  [] yes [] No Who:    []  Vascular  Completed  [] yes [] No Who:   []  Ortho  Completed  [] yes [] No Who:     []  Surgery  Completed  [] yes [] No Who:    []  Urology  Completed  [] yes [] No Who:    []  CT Surgery Completed  [] yes [] No Who:   []  Podiatry  Completed  [] yes [] No Who:    [x]  Other    Completed [x] yes [] No Who: Pulmonology: Ahmed  Interventions: Heparin on hold at this time. Important Events: CT with IV contrast showed multiple PE.         Electronically signed by Romana Reveal, RN on 6/30/2022 at 10:28 PM       Romana Reveal, RN  06/30/22 7554

## 2022-07-01 NOTE — CONSULTS
Pulmonary Medicine and [address] Coffey MD      Patient - Kacie Erickson   MRN -  [de-identified]   Kimberlysapril # - [de-identified]   - 1955      Date of Admission -  2022  7:39 PM  Date of evaluation -  2022  Room - [address] [place] [address]   Hospital Day -  Yadira Herrera MD Primary Care Physician - Conrado Omalley MD     Reason for Consult    Acute PE    Assessment   · Acute pulmonary emboli  · Acute DVT left lower extremity status left total knee replacement on 2022  · Obstructive sleep apnea/Obesity, on CPAP therapy  · Arthritis, CAD, CHF, fibromyalgia, GERD HLD, HTN    Recommendations   · Continue heparin drip  · Monitor SPO2 on room air  · Home CPAP while asleep  · Incentive spirometry every hour while awake  · Continue bilateral lower extremity compression stockings  · Pain control per primary team  · Check echo  · DVT prophylaxis, IV heparin. No objection to transitioning to oral anticoagulation with Xarelto that she was on prior to admission. Patient likely had clots at same time as DVT just undiagnosed. Doubt true failure of Xarelto. · Discharge planning when okay with other services, outpatient follow-up in 2 weeks. · Will follow with you    Problem List      Patient Active Problem List   Diagnosis    S/P total knee arthroplasty, left    Primary osteoarthritis of left knee    Acute pulmonary embolism ([facility] [address]       FRANK Erickson is 79 y.o., female presented to the emergency room yesterday with complaints of left calf pain. She had knee surgery on 2022, developed left calf pain afterwards. She called the office and was placed on gabapentin without any relief. She continues to have calf pain so she saw her rheumatologist who ordered an ultrasound of her leg and showed a DVT below her left knee. She was placed on Xarelto. She was scheduled to see vascular on 2022.   She continued to have some pain in her left calf and felt she did not have good instructions and was urged to come to the ER to be seen. She denies any shortness of breath, cough or chest pain. She had CTA of the chest which showed few scattered subsegmental pulmonary emboli noted predominantly within right upper lobe and left lower lobe with no other acute cardiopulmonary findings. Patient was admitted for further management. Currently sitting in the bed eating her lunch, no distress. She is on room air saturating well. She wore her home CPAP last night. She denies any chest pain, cough or shortness of breath. She does have some dizziness though. She has bilateral lower extremity compression stockings on she is on heparin drip currently.     PMHx   Past Medical History      Diagnosis Date    Arthritis     CAD (coronary artery disease)     CHF (congestive heart failure) ([facility] [address]     pt has not been diagnosed however on medications for her heart and a diuretic, assumes CHF    CPAP (continuous positive airway pressure) dependence     DJD (degenerative joint disease)     left ankle, left knee, rght knee    Fibromyalgia     GERD (gastroesophageal reflux disease)     Hyperlipidemia     Hypertension     Mitral valve regurgitation     Seasonal allergies     Sleep apnea     Spinal stenosis     Thyroid disease     hashimotos      Past Surgical History        Procedure Laterality Date    ANKLE SURGERY Left     x3    BREAST BIOPSY Right     DENTAL SURGERY      pt has permanant implanted dentures    HYSTERECTOMY (CERVIX STATUS UNKNOWN)      KNEE ARTHROSCOPY Left     LYMPHADENECTOMY Right     axilla, not cancer    PAIN MANAGEMENT PROCEDURE      back ablations, last @/2022    TOTAL KNEE ARTHROPLASTY Left 6/20/2022    LEFT KNEE TOTAL ARTHROPLASTY - CONFORMIS performed by Whitney Rosenbaum MD at [address] [facility]    Current Medications    aspirin  325 mg Oral Daily    dilTIAZem  240 mg Oral Daily    gabapentin  300 mg Oral BID    levothyroxine  88 mcg Oral Daily    liothyronine 5 mcg Oral Daily    losartan  100 mg Oral Daily    metoprolol succinate  200 mg Oral Daily    pantoprazole  40 mg Oral QAM AC    atorvastatin  10 mg Oral Nightly    sodium chloride flush  5-40 mL IntraVENous 2 times per day     morphine, sodium chloride flush, amitriptyline, oxyCODONE-acetaminophen, zolpidem, sodium chloride flush, sodium chloride, potassium chloride **OR** potassium alternative oral replacement **OR** potassium chloride, magnesium sulfate, ondansetron **OR** ondansetron, polyethylene glycol, acetaminophen **OR** acetaminophen, heparin (porcine), heparin (porcine)  IV Drips/Infusions   sodium chloride 5 mL/hr at 07/01/22 0449    heparin (PORCINE) Infusion 18 Units/kg/hr (07/01/22 0626)     Home Medications  Medications Prior to Admission: XARELTO 15 MG TABS tablet,   Probiotic Product (PROBIOTIC ACIDOPHILUS BEADS) CAPS, Take by mouth  gabapentin (NEURONTIN) 300 MG capsule, Take 300 mg by mouth 3 times daily. aspirin 325 MG tablet, Take 325 mg by mouth 2 times daily 60 tablets then discontinue  amitriptyline (ELAVIL) 50 MG tablet, Take 50 mg by mouth nightly   tiZANidine (ZANAFLEX) 4 MG tablet, tizanidine 4 mg tablet  fish oil-omega-3 fatty acids 1000 MG capsule, Take 2 g by mouth 2 times daily (Patient not taking: Reported on 6/23/2022)  coenzyme Q10 200 MG CAPS capsule, Take 1 capsule by mouth daily  dilTIAZem (TIAZAC) 240 MG extended release capsule, Take 240 mg by mouth daily  furosemide (LASIX) 20 MG tablet, Take 20 mg by mouth every 48 hours  liothyronine (CYTOMEL) 5 MCG tablet, Take 5 mcg by mouth daily   losartan (COZAAR) 100 MG tablet, Take 1 tablet by mouth daily  metoprolol succinate (TOPROL XL) 200 MG extended release tablet, Take 200 mg by mouth daily   oxyCODONE-acetaminophen (PERCOCET) 5-325 MG per tablet, Take 1 tablet by mouth every 4 hours as needed.    potassium chloride (KLOR-CON) 10 MEQ extended release tablet, Take 10 mEq by mouth as needed With lasix  omeprazole (PRILOSEC) 40 MG delayed release capsule, Take 40 mg by mouth daily   rizatriptan (MAXALT) 5 MG tablet, Take 5 mg by mouth once as needed   simvastatin (ZOCOR) 10 MG tablet, Take 10 mg by mouth nightly   levothyroxine (SYNTHROID) 88 MCG tablet, Take 88 mcg by mouth Daily   zolpidem (AMBIEN CR) 6.25 MG extended release tablet, Take 1 tablet by mouth daily. Multiple Vitamins-Minerals (MULTIVITAMIN ADULTS PO), Take 1 tablet by mouth daily  Omega-3 1000 MG CAPS, Take 2,000 mg by mouth daily (Patient not taking: Reported on 6/23/2022)  denosumab (PROLIA) 60 MG/ML SOSY SC injection, Inject 60 mg into the skin once January and July    Allergies    Amoxicillin  Social History     Social History     Tobacco Use    Smoking status: Never Smoker    Smokeless tobacco: Never Used   Substance Use Topics    Alcohol use: Never     Family History    No family history on file. ROS - 11 systems   General Denies any fever or chills  HEENT Denies any diplopia, tinnitus or vertigo  Resp Denies any shortness of breath, cough or wheezing  Cardiac Denies any chest pain, palpitations, claudication or edema  GI Denies any melena, hematochezia, hematemesis or pyrosis   Denies any frequency, urgency, hesitancy or incontinence  Heme Denies bruising or bleeding easily  Endocrine Denies any history of diabetes  Neuro Denies any focal motor or sensory deficits  Psychiatric Denies anxiety, depression, suicidal ideation  Skin Denies rashes, itching, open sores    Vitals     height is 5' 5\" (1.651 m) and weight is 225 lb (102.1 kg). Her temperature is 98.2 °F (36.8 °C). Her blood pressure is 133/67 and her pulse is 90. Her respiration is 17 and oxygen saturation is 95%. Body mass index is 37.44 kg/m².   I/O        Intake/Output Summary (Last 24 hours) at 7/1/2022 1224  Last data filed at 7/1/2022 0056  Gross per 24 hour   Intake --   Output 200 ml   Net -200 ml     I/O last 3 completed shifts:  In: -   Out: 200 [Urine:200]   Patient Vitals for the past 96 hrs (Last 3 readings):   Weight   06/30/22 1931 225 lb (102.1 kg)     Exam   General Appearance   Awake, alert, oriented, in no acute distress  HEENT - Head is normocephalic, atraumatic. Pupil reactive to light  Neck - Supple,  trachea midline and straight  Lungs -good air exchange, no crackles or wheezing  Cardiovascular - Heart sounds are normal.  Regular rhythm normal rate without murmur, gallop or rub. Abdomen - Soft, nontender, nondistended, no masses or organomegaly  Neurologic - CN II-XII are grossly intact. There are no focal motor or sensory deficits  Skin - No bruising or bleeding  Extremities - No cyanosis, clubbing.   Mild lower extremity edema    Labs  - Old records and notes have been reviewed in [facility]   CBC     Lab Results   Component Value Date/Time    WBC 12.9 07/01/2022 05:21 AM    RBC 3.33 07/01/2022 05:21 AM    HGB 10.3 07/01/2022 05:21 AM    HCT 32.9 07/01/2022 05:21 AM     07/01/2022 05:21 AM    MCV 98.8 07/01/2022 05:21 AM    MCH 30.9 07/01/2022 05:21 AM    MCHC 31.3 07/01/2022 05:21 AM    RDW 12.9 07/01/2022 05:21 AM    LYMPHOPCT 21 07/01/2022 05:21 AM    MONOPCT 10 07/01/2022 05:21 AM    BASOPCT 1 07/01/2022 05:21 AM    MONOSABS 1.31 07/01/2022 05:21 AM    LYMPHSABS 2.75 07/01/2022 05:21 AM    EOSABS 0.17 07/01/2022 05:21 AM    BASOSABS 0.08 07/01/2022 05:21 AM     BMP   Lab Results   Component Value Date/Time     07/01/2022 05:21 AM    K 3.9 07/01/2022 05:21 AM     07/01/2022 05:21 AM    CO2 27 07/01/2022 05:21 AM    BUN 18 07/01/2022 05:21 AM    CREATININE 0.99 07/01/2022 05:21 AM    GLUCOSE 135 07/01/2022 05:21 AM    CALCIUM 9.4 07/01/2022 05:21 AM     LFTS  Lab Results   Component Value Date/Time    ALKPHOS 90 06/30/2022 08:22 PM    ALT 16 06/30/2022 08:22 PM    AST 22 06/30/2022 08:22 PM    PROT 7.3 06/30/2022 08:22 PM    BILITOT 0.25 06/30/2022 08:22 PM    LABALBU 4.3 06/30/2022 08:22 PM     PTT  Lab Results   Component Value Date    APTT 41.6 (H) 06/30/2022     INR   Lab Results   Component Value Date    INR 1.5 07/01/2022    INR 1.8 06/30/2022    INR 0.9 05/26/2022    PROTIME 18.0 (H) 07/01/2022    PROTIME 20.7 (H) 06/30/2022    PROTIME 12.2 05/26/2022       Radiology    CT Scans    (See actual reports for details)    \"Thank you for asking us to see this patient\"    Case discussed with nurse and patient. Questions and concerns addressed.     Electronically signed by     Norma Dennis MD on 7/1/2022 at 2:22 PM  Pulmonary Critical Care and Sleep Medicine,  [facility]  Cell: [phone]  Office: [phone]

## 2022-07-01 NOTE — PLAN OF CARE
Problem: Discharge Planning  Goal: Discharge to home or other facility with appropriate resources  Outcome: Progressing  Flowsheets  Taken 7/1/2022 0000  Discharge to home or other facility with appropriate resources:   Identify barriers to discharge with patient and caregiver   Arrange for needed discharge resources and transportation as appropriate   Identify discharge learning needs (meds, wound care, etc)   Refer to discharge planning if patient needs post-hospital services based on physician order or complex needs related to functional status, cognitive ability or social support system    Problem: Pain  Goal: Verbalizes/displays adequate comfort level or baseline comfort level  Outcome: Progressing     Problem: Safety - Adult  Goal: Free from fall injury  Outcome: Progressing     Problem: ABCDS Injury Assessment  Goal: Absence of physical injury  Outcome: Progressing weight-bearing as tolerated

## 2022-07-01 NOTE — PROGRESS NOTES
[facility].,    Adult Hospitalist      Name: Clary An  MRN: [de-identified]     Acct: [de-identified]  Room: 1008/1008-02    Admit Date: 6/30/2022  7:39 PM  PCP: Flako Esparza MD    Primary Problem  Principal Problem:    Acute pulmonary embolism [facility])  Resolved Problems:    * No resolved hospital problems. *        Assesment:     · Acute pulmonary embolism  · Left leg deep vein thrombosis  · Status post left knee total arthroplasty dated 6/20/2022  · Essential hypertension  · Hypothyroidism  · Mixed hyperlipidemia  · Osteoporosis  · Gastroesophageal reflux disease without esophagitis  · Insomnia  · Obesity with BMI 37.4  · Leg cramps         Plan:     · Admit to progressive  · Monitor vitals closely  · Keep SPO2 above 90%  · I's and O's  · IV fluids  · Pain control  · Antiemetics as needed  · Heparin infusion  · Resume essential home medication  · Add parameters with a higher BP range  · Ace wrap left leg  · Anti-Xa   · CBC, BMP  · Tizanidine prn  · DVT and GI prophylaxis. Chief Complaint:     Chief Complaint   Patient presents with    Leg Pain     had knee surgery on 6/20. had doppler on 6/27 which showed blood clot in left leg. started on xarelto. History of Present Illness:        Pt seen and examined at bedside  Last 24 hr events r/w RN  Pt feels better  Pain better controlled  On Heparin gtt  Denies CP, dyspnea, cough  Denies abd pain, nausea, vomiting  Feeding well      INitial HPI  Clary An is a 79 y.o.  female who presents with Leg Pain (had knee surgery on 6/20. had doppler on 6/27 which showed blood clot in left leg. started on xarelto.)    Patient admitted from the emergency room where she presented with complaints of pain in her left leg. Patient underwent left total knee arthroplasty on 6/20/2022 and later developed a left DVT. She was started on Xarelto on 6/27/2022. Patient says for the last several days she has been having more pain in her left leg.   She also complained of some dizziness on bending over though denied having any chest pain, cough or wheezing. CTA chest showed 3 subsegmental pulmonary emboli. Patient denies any significant dyspnea or orthopnea. Denies fever or chills. Patient complains of a continuous cramp in her left calf since for several days now. Patient says she has been wearing elastic stockings continuously since surgery. She has seen a vascular surgeon and was being monitored on Xarelto. She states she took her last dose with dinner around 5 PM.    Denies headache, blurred vision or neck pain. Denies back pain or dysuria. Denies vomiting, diarrhea or constipation    I have personally reviewed the past medical history, past surgical history, medications, social history, and family history, and summarized in the note. Review of Systems:     All 10 point system is reviewed and negative otherwise mentioned in HPI.       Past Medical History:     Past Medical History:   Diagnosis Date    Arthritis     CAD (coronary artery disease)     CHF (congestive heart failure) ([facility] [address]     pt has not been diagnosed however on medications for her heart and a diuretic, assumes CHF    CPAP (continuous positive airway pressure) dependence     DJD (degenerative joint disease)     left ankle, left knee, rght knee    Fibromyalgia     GERD (gastroesophageal reflux disease)     Hyperlipidemia     Hypertension     Mitral valve regurgitation     Seasonal allergies     Sleep apnea     Spinal stenosis     Thyroid disease     hashimotos        Past Surgical History:     Past Surgical History:   Procedure Laterality Date    ANKLE SURGERY Left     x3    BREAST BIOPSY Right     DENTAL SURGERY      pt has permanant implanted dentures    HYSTERECTOMY (CERVIX STATUS UNKNOWN)      KNEE ARTHROSCOPY Left     LYMPHADENECTOMY Right     axilla, not cancer    PAIN MANAGEMENT PROCEDURE      back ablations, last @/2022    TOTAL KNEE ARTHROPLASTY Left 6/20/2022    LEFT KNEE TOTAL ARTHROPLASTY - Larene Obdulia performed by Ina Holstein, MD at [facility]        Medications Prior to Admission:       Prior to Admission medications    Medication Sig Start Date End Date Taking? Authorizing Provider   Menaquinone-7 (VITAMIN K2 PO) Take by mouth daily   Yes Historical Provider, MD   vitamin D3 (CHOLECALCIFEROL) 10 MCG (400 UNIT) TABS tablet Take 400 Units by mouth daily   Yes Historical Provider, MD   XARELTO 15 MG TABS tablet Take 15 mg by mouth 2 times daily (with meals)  6/27/22   Historical Provider, MD   Probiotic Product (PROBIOTIC ACIDOPHILUS BEADS) CAPS Take by mouth    Historical Provider, MD   gabapentin (NEURONTIN) 300 MG capsule Take 300 mg by mouth 3 times daily. Historical Provider, MD   aspirin 325 MG tablet Take 325 mg by mouth 2 times daily 60 tablets then discontinue    Historical Provider, MD   amitriptyline (ELAVIL) 50 MG tablet Take 50 mg by mouth nightly     Historical Provider, MD   tiZANidine (ZANAFLEX) 4 MG tablet Take 4 mg by mouth every 12 hours     Historical Provider, MD   coenzyme Q10 200 MG CAPS capsule Take 1 capsule by mouth daily 6/6/20   Historical Provider, MD   [facility]) 240 MG extended release capsule Take 240 mg by mouth daily 8/2/21   Historical Provider, MD   furosemide (LASIX) 20 MG tablet Take 20 mg by mouth three times a week  12/3/21   Historical Provider, MD   liothyronine (CYTOMEL) 5 MCG tablet Take 5 mcg by mouth daily  4/24/13   Historical Provider, MD   losartan (COZAAR) 100 MG tablet Take 1 tablet by mouth daily 12/13/21   Historical Provider, MD   metoprolol succinate (TOPROL XL) 200 MG extended release tablet Take 200 mg by mouth daily  5/18/21   Historical Provider, MD   oxyCODONE-acetaminophen (PERCOCET) 5-325 MG per tablet Take 1 tablet by mouth every 4 hours as needed.   12/3/18   Historical Provider, MD   potassium chloride (KLOR-CON) 10 MEQ extended release tablet Take 10 mEq by mouth as needed With lasix    Historical Provider, MD   omeprazole (PRILOSEC) 40 MG delayed release capsule Take 40 mg by mouth daily  13   Historical Provider, MD   rizatriptan (MAXALT) 5 MG tablet Take 5 mg by mouth once as needed  20   Historical Provider, MD   simvastatin (ZOCOR) 10 MG tablet Take 10 mg by mouth nightly  21   Historical Provider, MD   levothyroxine (SYNTHROID) 88 MCG tablet Take 88 mcg by mouth Daily  13   Historical Provider, MD   zolpidem (AMBIEN CR) 6.25 MG extended release tablet Take 1 tablet by mouth every evening. 21   Historical Provider, MD   Multiple Vitamins-Minerals (MULTIVITAMIN ADULTS PO) Take 1 tablet by mouth daily    Historical Provider, MD   denosumab (PROLIA) 60 MG/ML SOSY SC injection Inject 60 mg into the skin once August & February    Historical Provider, MD        Allergies:       Amoxicillin    Social History:     Tobacco:    reports that she has never smoked. She has never used smokeless tobacco.  Alcohol:      reports no history of alcohol use. Drug Use:  reports no history of drug use. Family History:     No family history on file.       Physical Exam:     Vitals:  BP (!) 141/71   Pulse 85   Temp 98.1 °F (36.7 °C) (Oral)   Resp 17   Ht 5' 5\" (1.651 m)   Wt 225 lb (102.1 kg)   SpO2 94%   BMI 37.44 kg/m²   Temp (24hrs), Av.2 °F (36.8 °C), Min:98.1 °F (36.7 °C), Max:98.4 °F (36.9 °C)      General appearance - alert, well appearing, and in no acute distress  Mental status - oriented to person, place, and time with normal affect  Head - normocephalic and atraumatic  Eyes - pupils equal and reactive, extraocular eye movements intact, conjunctiva clear  Ears - hearing appears to be intact  Nose - no drainage noted  Mouth - mucous membranes moist  Neck - supple, no carotid bruits, thyroid not palpable  Chest - clear to auscultation, normal effort  Heart - normal rate, regular rhythm, no murmur  Abdomen - soft, nontender, nondistended, bowel sounds present all four quadrants, no masses, hepatomegaly or splenomegaly  Neurological - normal speech, no focal findings or movement disorder noted, cranial nerves II through XII grossly intact  Extremities - peripheral pulses palpable, left leg edema. Left calf tenderness.   Homans' sign  Skin - no gross lesions, rashes, or induration noted        Data:     Labs:    Hematology:  Recent Labs     06/30/22 2022 07/01/22 0521   WBC  --  12.9*   RBC  --  3.33*   HGB  --  10.3*   HCT  --  32.9*   MCV  --  98.8   MCH  --  30.9   MCHC  --  31.3   RDW  --  12.9   PLT  --  394   MPV  --  9.0   INR 1.8 1.5     Chemistry:  Recent Labs     06/30/22 2022 07/01/22 0521    137   K 4.8 3.9   CL 99 100   CO2 27 27   GLUCOSE 117* 135*   BUN 21 18   CREATININE 1.13* 0.99*   ANIONGAP 10 10   LABGLOM 48* 56*   GFRAA 58* >60   CALCIUM 9.7 9.4   TROPHS 13  --      Recent Labs     06/30/22 2022   PROT 7.3   LABALBU 4.3   AST 22   ALT 16   ALKPHOS 90   BILITOT 0.25*       Lab Results   Component Value Date    INR 1.5 07/01/2022    INR 1.8 06/30/2022    INR 0.9 05/26/2022    PROTIME 18.0 (H) 07/01/2022    PROTIME 20.7 (H) 06/30/2022    PROTIME 12.2 05/26/2022       Lab Results   Component Value Date/Time    SPECIAL NOT REPORTED 12/17/2014 10:46 AM    SPECIAL NOT REPORTED 12/17/2014 10:46 AM    SPECIAL NOT REPORTED 12/17/2014 10:46 AM     Lab Results   Component Value Date/Time    CULTURE NORMAL GIULIA 12/17/2014 10:46 AM    CULTURE NO ANAEROBIC ORGANISMS ISOLATED AT 5 DAYS (A) 12/17/2014 10:46 AM    CULTURE  12/17/2014 10:46 AM     [address] [phone]    CULTURE NO GROWTH 32 DAYS 12/17/2014 10:46 AM    CULTURE  12/17/2014 10:46 AM     [address] [phone]    CULTURE NO GROWTH 46 DAYS 12/17/2014 10:46 AM    CULTURE  12/17/2014 10:46 AM     [facility] [address] [facility], [address] [place] [address] [phone]       No results found for: POCPH, PHART, PH, POCPCO2, CFI8QYU, PCO2, POCPO2, PO2ART, PO2, POCHCO3, JBA9EDP, HCO3, NBEA, PBEA, BEART, BE, THGBART, THB, TZH2DDQ, NQUF0MPN, V1JORKEO, O2SAT, FIO2    Radiology:    CT CHEST PULMONARY EMBOLISM W CONTRAST    Result Date: 6/30/2022  1. Few scattered subsegmental pulmonary emboli are noted predominately within the right upper lobe and left lower lobe. 2. No other acute cardiopulmonary findings. Critical results were called by Dr. Ellene Gottron. Liana Bai MD to Mitzy Hickey CNP on 6/30/2022 at 21:40. All radiological studies reviewed                Code Status:  Full Code    Electronically signed by Rosette Damon MD on 7/1/2022 at 7:00 PM     Copy sent to Dr. Victorina Latif MD    This note was created with the assistance of a speech-recognition program.  Although the intention is to generate a document that actually reflects the content of the visit, no guarantees can be provided that every mistake has been identified and corrected by editing. Note was updated later by me after  physical examination and  completion of the assessment.

## 2022-07-01 NOTE — CARE COORDINATION
Case Management Initial Discharge Plan  Emma Monique,         Huey Risk              Risk of Unplanned Readmission:  14             Met with:patient to discuss discharge plans. Information verified: address, contacts, phone number, , insurance Yes  PCP: Marcos Holt MD  Date of last visit: May 2022    Insurance Provider: [address] [place] [address]    Discharge Planning  Current Residence:  Private home  Living Arrangements:  Spouse/Significant Other   Home has 1 stories/13 outside  stairs to climb  Support Systems:  Spouse/Significant Other  Current Services PTA:  none Agency: none  Patient able to perform ADL's:Assisted  DME in home:  Walker, CPAP, walk in shower with seat. Handicap height toilet. DME used to aid ambulation prior to admission:   walker  DME used during admission:  Mary Rota, CPAP    Potential Assistance Needed:  N/A    Pharmacy: [facility] in [address] [place] [address] Medications:  No  Does patient want to participate in local refill/ meds to beds program?  No    Patient agreeable to home care: No  [place] of choice provided:  n/a      Type of Home Care Services:  None  Patient expects to be discharged to:   home with assist from family    Prior SNF/Rehab Placement and Facility: none    Agreeable to SNF/Rehab: No  [place] of choice provided: n/a   Evaluation: n/a    Expected Discharge date: Follow Up Appointment: Best Day/ Time: Friday PM    Transportation provider: family  Transportation arrangements needed for discharge: No    Discharge Plan:   Met with patient to review plan of care. Pt had left total knee replacement 22. Dx with DVT  and started on Xarelto. Dx with PE on . Pt lives with her . The original home is a tri level and has 13 steps to enter.   Her son and his family live in the original home and she and her  added on a one level apartment for them to live in but pt has to enter through the main home to get to her living area. She states the steps are not a problem for her. Pt hopes to go home soon as her son and his family from [place] are here visiting now. PLAN  Pt denies need for any services. Xarelto home med and costs $32/month.         Electronically signed by Tracy Rincon on 7/1/22 at 9:45 AM EDT

## 2022-07-01 NOTE — PROGRESS NOTES
Transitions of Care Pharmacy Service   Medication Review    The patient's list of current home medications has been reviewed. Source(s) of information: Myriam, [address], Patient     Based on information provided by the above source(s), I have updated the patient's home med list as described below. I changed or updated the following medications on the patient's home medication list:  Discontinued Fish oil- omega-3 fatty acids 1000mg capsule (per pt)  Omega 3 1000mg capsule (per pt)     Added Vitamin K2 (unsure of dose, OTC)  Vitamin D3 (unsure of dose, OTC)     Adjusted   Tizanidine (ZANAFLEX) 4mg tablet (adjusted to QHS from prn)  XARELTO (adjusted to include Q12H sig)  denosumab (PROLIA) 60 MG/ML SOSY SC injection     Other Notes N/A           Please feel free to call me with any questions about this encounter. Thank you. This note will be reviewed and co-signed by the Transitions of Care Pharmacist. The pharmacist will review inpatient orders and contact the physician about any discrepancies. Eduarda Peñaloza, pharmacy technician  Transitions of Care Pharmacy Service  Phone:  [phone]  Fax: [phone]      Electronically signed by Eduarda Peñaloza on 7/1/2022 at 3:31 PM         Prior to Admission medications    Medication Sig   XARELTO 15 MG TABS tablet Take 15 mg by mouth 2 times daily (with meals)    Probiotic Product (PROBIOTIC ACIDOPHILUS BEADS) CAPS Take by mouth   gabapentin (NEURONTIN) 300 MG capsule Take 300 mg by mouth 3 times daily.     aspirin 325 MG tablet Take 325 mg by mouth 2 times daily 60 tablets then discontinue   amitriptyline (ELAVIL) 50 MG tablet Take 50 mg by mouth nightly    tiZANidine (ZANAFLEX) 4 MG tablet Take 4 mg by mouth every 12 hours    coenzyme Q10 200 MG CAPS capsule Take 1 capsule by mouth daily   dilTIAZem (TIAZAC) 240 MG extended release capsule Take 240 mg by mouth daily   furosemide (LASIX) 20 MG tablet Take 20 mg by mouth every 48 hours liothyronine (CYTOMEL) 5 MCG tablet Take 5 mcg by mouth daily    losartan (COZAAR) 100 MG tablet Take 1 tablet by mouth daily   metoprolol succinate (TOPROL XL) 200 MG extended release tablet Take 200 mg by mouth daily    oxyCODONE-acetaminophen (PERCOCET) 5-325 MG per tablet Take 1 tablet by mouth every 4 hours as needed. potassium chloride (KLOR-CON) 10 MEQ extended release tablet Take 10 mEq by mouth as needed With lasix   omeprazole (PRILOSEC) 40 MG delayed release capsule Take 40 mg by mouth daily    rizatriptan (MAXALT) 5 MG tablet Take 5 mg by mouth once as needed    simvastatin (ZOCOR) 10 MG tablet Take 10 mg by mouth nightly    levothyroxine (SYNTHROID) 88 MCG tablet Take 88 mcg by mouth Daily    zolpidem (AMBIEN CR) 6.25 MG extended release tablet Take 1 tablet by mouth every evening.     Multiple Vitamins-Minerals (MULTIVITAMIN ADULTS PO) Take 1 tablet by mouth daily   denosumab (PROLIA) 60 MG/ML SOSY SC injection Inject 60 mg into the skin once 6 months apart   Vitamin D3 Once daily by mouth    Vitamin K2 Once daily by mouth

## 2022-07-01 NOTE — ACP (ADVANCE CARE PLANNING)
Advance Care Planning   Advance Care Planning Clinical Specialist  Conversation Note      Date of ACP Conversation: 7/1/2022    Conversation Conducted with:   Patient with [address]: Next of Kin by law (only applies in absence of above) self and then     ACP Clinical Specialist: Jamee Xie      *When Decision Maker makes decisions on behalf of the incapacitated patient: Decision Maker is asked to consider and make decisions based on patient values, known preferences, or best interests. Current Designated Health Care Decision Maker:   (as entered in [address] field. Validate  this information as still accurate & up-to-date; edit [employer] field as needed.)    If no Decision Maker listed above or available through scanned documents, then:    [address]   Who do you trust to make healthcare decisions for you? Name:   Terese Rodriguez   Phone  Number: [phone]  Can this person be reached and be able to respond quickly, such as within a few minutes or hours? Yes    Who would be your back-up decision maker? Name none  Phone Number:    For below questions, when conducting conversation with [address], substitute \"she\" and \"her\" for \"you\" and \"your\". Hospitalization  If your health were to worsen and it became clear that your chance of recovery was unlikely, what would your preferences be regarding hospitalization?:    Choice:  []  The patient would want hospitalization  []  The patient would prefer comfort-focused treatment without hospitalization. Ventilation  If you were in your present state of health and suddenly became very ill and were unable to breathe on your own, what would your preference be about the use of a ventilator (breathing machine) if it were available to you?       If patient would desire the use of a ventilator (breathing machine), answer \"yes\", if not \"no\":yes    If your health were to worsen and it became clear that your chance of recovery was unlikely, would that change your answer? No    Resuscitation  CPR works best to restart the heart when there is a sudden event, like a heart attack, in someone who is otherwise healthy. Unfortunately, CPR does not typically restart the heart for people who have serious health conditions or who are very sick. In the event your heart stopped, would you want attempts to restart your heart (answer \"yes\") or would you prefer a natural death (answer \"no\")? yes    If your health were to worsen and it became clear that your chance of recovery was unlikely, would that change your answer? No    [] Yes  [] No   Educated Patient / Duf[employer] St[employer] regarding differences between Advance Directives and portable DNR orders.     Length of ACP Conversation in minutes:  5 minutes    Conversation Outcomes:  [] ACP discussion completed  [] Existing advance directive reviewed with patient; no changes to patient's previously recorded wishes   [] New Advance Directive completed   [] Portable Do Not Rescitate prepared for Provider review and signature  [] POLST/POST/MOLST/MOST prepared for Provider review and signature      Follow-up plan:    [] Schedule follow-up conversation to continue planning  [] Referred individual to Provider for additional questions/concerns   [] Advised patient/agent/surrogate to review completed ACP document and update if needed with changes in condition, patient preferences or care setting     [] This note routed to one or more involved healthcare providers

## 2022-07-01 NOTE — PROGRESS NOTES
Writer spoke with pharmacy, heparin drip to be started in AM without bolus as patient had taken Xarelto on 6/30 at 1700. Med rec in AM for other home medications.

## 2022-07-02 VITALS
OXYGEN SATURATION: 94 % | HEIGHT: 65 IN | WEIGHT: 225 LBS | RESPIRATION RATE: 16 BRPM | TEMPERATURE: 98.2 F | BODY MASS INDEX: 37.49 KG/M2 | DIASTOLIC BLOOD PRESSURE: 67 MMHG | HEART RATE: 90 BPM | SYSTOLIC BLOOD PRESSURE: 144 MMHG

## 2022-07-02 LAB
ABSOLUTE EOS #: 0.15 K/UL (ref 0–0.44)
ABSOLUTE IMMATURE GRANULOCYTE: 0.06 K/UL (ref 0–0.3)
ABSOLUTE LYMPH #: 3.19 K/UL (ref 1.1–3.7)
ABSOLUTE MONO #: 1.09 K/UL (ref 0.1–1.2)
ANION GAP SERPL CALCULATED.3IONS-SCNC: 12 MMOL/L (ref 9–17)
BASOPHILS # BLD: 1 % (ref 0–2)
BASOPHILS ABSOLUTE: 0.08 K/UL (ref 0–0.2)
BUN BLDV-MCNC: 13 MG/DL (ref 8–23)
BUN/CREAT BLD: 14 (ref 9–20)
CALCIUM SERPL-MCNC: 9.1 MG/DL (ref 8.6–10.4)
CHLORIDE BLD-SCNC: 103 MMOL/L (ref 98–107)
CO2: 25 MMOL/L (ref 20–31)
CREAT SERPL-MCNC: 0.95 MG/DL (ref 0.5–0.9)
EOSINOPHILS RELATIVE PERCENT: 1 % (ref 1–4)
GFR AFRICAN AMERICAN: >60 ML/MIN
GFR NON-AFRICAN AMERICAN: 59 ML/MIN
GFR SERPL CREATININE-BSD FRML MDRD: ABNORMAL ML/MIN/{1.73_M2}
GLUCOSE BLD-MCNC: 103 MG/DL (ref 70–99)
HCT VFR BLD CALC: 31.8 % (ref 36.3–47.1)
HEMOGLOBIN: 10.1 G/DL (ref 11.9–15.1)
IMMATURE GRANULOCYTES: 0 %
LYMPHOCYTES # BLD: 24 % (ref 24–43)
MCH RBC QN AUTO: 31 PG (ref 25.2–33.5)
MCHC RBC AUTO-ENTMCNC: 31.8 G/DL (ref 28.4–34.8)
MCV RBC AUTO: 97.5 FL (ref 82.6–102.9)
MONOCYTES # BLD: 8 % (ref 3–12)
NRBC AUTOMATED: 0 PER 100 WBC
PARTIAL THROMBOPLASTIN TIME: 100.3 SEC (ref 23.9–33.8)
PARTIAL THROMBOPLASTIN TIME: 61.9 SEC (ref 23.9–33.8)
PDW BLD-RTO: 13.1 % (ref 11.8–14.4)
PLATELET # BLD: 359 K/UL (ref 138–453)
PMV BLD AUTO: 9 FL (ref 8.1–13.5)
POTASSIUM SERPL-SCNC: 3.8 MMOL/L (ref 3.7–5.3)
RBC # BLD: 3.26 M/UL (ref 3.95–5.11)
SEG NEUTROPHILS: 66 % (ref 36–65)
SEGMENTED NEUTROPHILS ABSOLUTE COUNT: 8.86 K/UL (ref 1.5–8.1)
SODIUM BLD-SCNC: 140 MMOL/L (ref 135–144)
WBC # BLD: 13.4 K/UL (ref 3.5–11.3)

## 2022-07-02 PROCEDURE — 85730 THROMBOPLASTIN TIME PARTIAL: CPT

## 2022-07-02 PROCEDURE — 85025 COMPLETE CBC W/AUTO DIFF WBC: CPT

## 2022-07-02 PROCEDURE — 6360000002 HC RX W HCPCS: Performed by: PHYSICIAN ASSISTANT

## 2022-07-02 PROCEDURE — 80048 BASIC METABOLIC PNL TOTAL CA: CPT

## 2022-07-02 PROCEDURE — 6360000002 HC RX W HCPCS: Performed by: FAMILY MEDICINE

## 2022-07-02 PROCEDURE — 6370000000 HC RX 637 (ALT 250 FOR IP): Performed by: FAMILY MEDICINE

## 2022-07-02 PROCEDURE — 36415 COLL VENOUS BLD VENIPUNCTURE: CPT

## 2022-07-02 PROCEDURE — 2580000003 HC RX 258: Performed by: FAMILY MEDICINE

## 2022-07-02 RX ORDER — RIVAROXABAN 15 MG/1
15 TABLET, FILM COATED ORAL 2 TIMES DAILY WITH MEALS
Qty: 36 TABLET | Refills: 0 | Status: SHIPPED | OUTPATIENT
Start: 2022-07-02 | End: 2022-07-20

## 2022-07-02 RX ADMIN — DILTIAZEM HYDROCHLORIDE 240 MG: 120 CAPSULE, COATED, EXTENDED RELEASE ORAL at 08:32

## 2022-07-02 RX ADMIN — GABAPENTIN 300 MG: 300 CAPSULE ORAL at 14:24

## 2022-07-02 RX ADMIN — LEVOTHYROXINE SODIUM 88 MCG: 0.09 TABLET ORAL at 05:41

## 2022-07-02 RX ADMIN — PANTOPRAZOLE SODIUM 40 MG: 40 TABLET, DELAYED RELEASE ORAL at 05:41

## 2022-07-02 RX ADMIN — HEPARIN SODIUM 4080 UNITS: 1000 INJECTION INTRAVENOUS; SUBCUTANEOUS at 14:16

## 2022-07-02 RX ADMIN — RIVAROXABAN 15 MG: 15 TABLET, FILM COATED ORAL at 15:32

## 2022-07-02 RX ADMIN — MORPHINE SULFATE 4 MG: 4 INJECTION, SOLUTION INTRAMUSCULAR; INTRAVENOUS at 11:00

## 2022-07-02 RX ADMIN — ASPIRIN 325 MG: 325 TABLET ORAL at 08:32

## 2022-07-02 RX ADMIN — LIOTHYRONINE SODIUM 5 MCG: 5 TABLET ORAL at 08:32

## 2022-07-02 RX ADMIN — LOSARTAN POTASSIUM 100 MG: 100 TABLET, FILM COATED ORAL at 08:32

## 2022-07-02 RX ADMIN — OXYCODONE AND ACETAMINOPHEN 1 TABLET: 5; 325 TABLET ORAL at 14:24

## 2022-07-02 RX ADMIN — OXYCODONE AND ACETAMINOPHEN 1 TABLET: 5; 325 TABLET ORAL at 08:11

## 2022-07-02 RX ADMIN — METOPROLOL SUCCINATE 200 MG: 50 TABLET, EXTENDED RELEASE ORAL at 08:32

## 2022-07-02 RX ADMIN — GABAPENTIN 300 MG: 300 CAPSULE ORAL at 08:38

## 2022-07-02 RX ADMIN — HEPARIN SODIUM AND DEXTROSE 14 UNITS/KG/HR: 10000; 5 INJECTION INTRAVENOUS at 14:16

## 2022-07-02 ASSESSMENT — PAIN SCALES - GENERAL
PAINLEVEL_OUTOF10: 9
PAINLEVEL_OUTOF10: 0

## 2022-07-02 ASSESSMENT — PAIN DESCRIPTION - LOCATION
LOCATION: LEG
LOCATION: LEG;HIP

## 2022-07-02 ASSESSMENT — PAIN DESCRIPTION - ORIENTATION: ORIENTATION: POSTERIOR

## 2022-07-02 ASSESSMENT — PAIN DESCRIPTION - FREQUENCY: FREQUENCY: CONTINUOUS

## 2022-07-02 ASSESSMENT — PAIN DESCRIPTION - DESCRIPTORS: DESCRIPTORS: THROBBING

## 2022-07-02 NOTE — PROGRESS NOTES
Patient called out complaining of increased left leg pain and describing it as \"deep pain\". Patient currently on heparin gtt for known DVT in left calf as well as PE's. Pain medications providing little relief. Pulm rounding and recommending vascular consult. Dr. No Newell notified of recommendation, orders received to consult vascular. Dr. Treva Evans notified. No new orders at this time.

## 2022-07-02 NOTE — PROGRESS NOTES
[facility].,    Adult Hospitalist      Name: Daniel Horta  MRN: [de-identified]     Acct: [de-identified]  Room: 1008/1008-02    Admit Date: 6/30/2022  7:39 PM  PCP: Kota Hernandez MD    Primary Problem  Principal Problem:    Acute pulmonary embolism [facility])  Resolved Problems:    * No resolved hospital problems. *        Assesment:     · Acute pulmonary embolism  · Left leg deep vein thrombosis  · Status post left knee total arthroplasty dated 6/20/2022  · Essential hypertension  · Hypothyroidism  · Mixed hyperlipidemia  · Osteoporosis  · Gastroesophageal reflux disease without esophagitis  · Insomnia  · Obesity with BMI 37.4  · Leg cramps         Plan:     · Admit to progressive  · Monitor vitals closely  · Keep SPO2 above 90%  · I's and O's  · IV fluids  · Pain control  · Antiemetics as needed  · Heparin infusion  · Resume essential home medication  · Add parameters with a higher BP range  · Ace wrap left leg  · Anti-Xa   · CBC, BMP  · Tizanidine prn  · Plan DC if OK w all  · ANA  Rx  · Determine home regimen   · D/w RN  · Spent more than 35 min   · DVT and GI prophylaxis. Chief Complaint:     Chief Complaint   Patient presents with    Leg Pain     had knee surgery on 6/20. had doppler on 6/27 which showed blood clot in left leg. started on xarelto.          History of Present Illness:        Pt seen and examined at bedside  Last 24 hr events r/w RN  Pt feels better  Pain better controlled  On Heparin gtt    Felt worse left leg  Consult Vascular - appreciate input  D/w Dr. Rosalba Mendez 15 mg bid, first dose at 3 pm  Stop Heparin gtt in 4 hrs  Pt to take next dose Xarelto in am  After 18 days to start Xarelto 20 mg daily    Denies CP, dyspnea, cough  Denies abd pain, nausea, vomiting  Feeding well      INitial HPI  Daniel Horta is a 79 y.o.  female who presents with Leg Pain (had knee surgery on 6/20. had doppler on 6/27 which showed blood clot in left leg. started on xarelto.)    Patient admitted from the emergency room where she presented with complaints of pain in her left leg. Patient underwent left total knee arthroplasty on 6/20/2022 and later developed a left DVT. She was started on Xarelto on 6/27/2022. Patient says for the last several days she has been having more pain in her left leg. She also complained of some dizziness on bending over though denied having any chest pain, cough or wheezing. CTA chest showed 3 subsegmental pulmonary emboli. Patient denies any significant dyspnea or orthopnea. Denies fever or chills. Patient complains of a continuous cramp in her left calf since for several days now. Patient says she has been wearing elastic stockings continuously since surgery. She has seen a vascular surgeon and was being monitored on Xarelto. She states she took her last dose with dinner around 5 PM.    Denies headache, blurred vision or neck pain. Denies back pain or dysuria. Denies vomiting, diarrhea or constipation    I have personally reviewed the past medical history, past surgical history, medications, social history, and family history, and summarized in the note. Review of Systems:     All 10 point system is reviewed and negative otherwise mentioned in HPI.       Past Medical History:     Past Medical History:   Diagnosis Date    Arthritis     CAD (coronary artery disease)     CHF (congestive heart failure) ([facility] [address]     pt has not been diagnosed however on medications for her heart and a diuretic, assumes CHF    CPAP (continuous positive airway pressure) dependence     DJD (degenerative joint disease)     left ankle, left knee, rght knee    Fibromyalgia     GERD (gastroesophageal reflux disease)     Hyperlipidemia     Hypertension     Mitral valve regurgitation     Seasonal allergies     Sleep apnea     Spinal stenosis     Thyroid disease     hashimotos        Past Surgical History:     Past Surgical History:   Procedure Laterality Date    ANKLE release tablet Take 200 mg by mouth daily  21   Historical Provider, MD   oxyCODONE-acetaminophen (PERCOCET) 5-325 MG per tablet Take 1 tablet by mouth every 4 hours as needed. 12/3/18   Historical Provider, MD   potassium chloride (KLOR-CON) 10 MEQ extended release tablet Take 10 mEq by mouth as needed With lasix    Historical Provider, MD   omeprazole (PRILOSEC) 40 MG delayed release capsule Take 40 mg by mouth daily  13   Historical Provider, MD   rizatriptan (MAXALT) 5 MG tablet Take 5 mg by mouth once as needed  20   Historical Provider, MD   simvastatin (ZOCOR) 10 MG tablet Take 10 mg by mouth nightly  21   Historical Provider, MD   levothyroxine (SYNTHROID) 88 MCG tablet Take 88 mcg by mouth Daily  13   Historical Provider, MD   zolpidem (AMBIEN CR) 6.25 MG extended release tablet Take 1 tablet by mouth every evening. 21   Historical Provider, MD   Multiple Vitamins-Minerals (MULTIVITAMIN ADULTS PO) Take 1 tablet by mouth daily    Historical Provider, MD   denosumab (PROLIA) 60 MG/ML SOSY SC injection Inject 60 mg into the skin once August & February    Historical Provider, MD        Allergies:       Amoxicillin    Social History:     Tobacco:    reports that she has never smoked. She has never used smokeless tobacco.  Alcohol:      reports no history of alcohol use. Drug Use:  reports no history of drug use. Family History:     No family history on file.       Physical Exam:     Vitals:  BP (!) 143/76   Pulse 88   Temp 97.7 °F (36.5 °C) (Oral)   Resp 16   Ht 5' 5\" (1.651 m)   Wt 225 lb (102.1 kg)   SpO2 94%   BMI 37.44 kg/m²   Temp (24hrs), Av.4 °F (36.9 °C), Min:97.7 °F (36.5 °C), Max:98.8 °F (37.1 °C)      General appearance - alert, well appearing, and in no acute distress  Mental status - oriented to person, place, and time with normal affect  Head - normocephalic and atraumatic  Eyes - pupils equal and reactive, extraocular eye movements intact, conjunctiva clear  Ears - hearing appears to be intact  Nose - no drainage noted  Mouth - mucous membranes moist  Neck - supple, no carotid bruits, thyroid not palpable  Chest - clear to auscultation, normal effort  Heart - normal rate, regular rhythm, no murmur  Abdomen - soft, nontender, nondistended, bowel sounds present all four quadrants, no masses, hepatomegaly or splenomegaly  Neurological - normal speech, no focal findings or movement disorder noted, cranial nerves II through XII grossly intact  Extremities - peripheral pulses palpable, left leg edema. Left calf tenderness.   Lark Castillo' sign  Skin - no gross lesions, rashes, or induration noted        Data:     Labs:    Hematology:  Recent Labs     06/30/22 2022 07/01/22 0521 07/02/22  0405   WBC  --  12.9* 13.4*   RBC  --  3.33* 3.26*   HGB  --  10.3* 10.1*   HCT  --  32.9* 31.8*   MCV  --  98.8 97.5   MCH  --  30.9 31.0   MCHC  --  31.3 31.8   RDW  --  12.9 13.1   PLT  --  394 359   MPV  --  9.0 9.0   INR 1.8 1.5  --      Chemistry:  Recent Labs     06/30/22 2022 07/01/22 0521 07/02/22  0405    137 140   K 4.8 3.9 3.8   CL 99 100 103   CO2 27 27 25   GLUCOSE 117* 135* 103*   BUN 21 18 13   CREATININE 1.13* 0.99* 0.95*   ANIONGAP 10 10 12   LABGLOM 48* 56* 59*   GFRAA 58* >60 >60   CALCIUM 9.7 9.4 9.1   TROPHS 13  --   --      Recent Labs     06/30/22 2022   PROT 7.3   LABALBU 4.3   AST 22   ALT 16   ALKPHOS 90   BILITOT 0.25*       Lab Results   Component Value Date    INR 1.5 07/01/2022    INR 1.8 06/30/2022    INR 0.9 05/26/2022    PROTIME 18.0 (H) 07/01/2022    PROTIME 20.7 (H) 06/30/2022    PROTIME 12.2 05/26/2022       Lab Results   Component Value Date/Time    SPECIAL NOT REPORTED 12/17/2014 10:46 AM    SPECIAL NOT REPORTED 12/17/2014 10:46 AM    SPECIAL NOT REPORTED 12/17/2014 10:46 AM     Lab Results   Component Value Date/Time    CULTURE NORMAL GIULIA 12/17/2014 10:46 AM    CULTURE NO ANAEROBIC ORGANISMS ISOLATED AT 5 DAYS (A) 12/17/2014 10:46 AM CULTURE  12/17/2014 10:46 AM     [facility] [id number] [facility], [address] [facility] [phone]    CULTURE NO GROWTH 32 DAYS 12/17/2014 10:46 AM    CULTURE  12/17/2014 10:46 AM     [facility] [id number] [facility], [address] [facility] [phone]    CULTURE NO GROWTH 46 DAYS 12/17/2014 10:46 AM    CULTURE  12/17/2014 10:46 AM     [facility] [id number] [facility], [address] [facility] [phone]       No results found for: POCPH, PHART, PH, POCPCO2, KOB2AIQ, PCO2, POCPO2, PO2ART, PO2, POCHCO3, GJE9FIS, HCO3, NBEA, PBEA, BEART, BE, THGBART, THB, IXP0QMF, NKNY6GRS, D5NFGYXN, O2SAT, FIO2    Radiology:    CT CHEST PULMONARY EMBOLISM W CONTRAST    Result Date: 6/30/2022  1. Few scattered subsegmental pulmonary emboli are noted predominately within the right upper lobe and left lower lobe. 2. No other acute cardiopulmonary findings. Critical results were called by Dr. Megan Harris. Maritza Turner MD to Lenton Meigs, CNP on 6/30/2022 at 21:40. All radiological studies reviewed                Code Status:  Full Code    Electronically signed by Chema Vidales MD on 7/2/2022 at 2:05 PM     Copy sent to Dr. Doreen Mueller MD    This note was created with the assistance of a speech-recognition program.  Although the intention is to generate a document that actually reflects the content of the visit, no guarantees can be provided that every mistake has been identified and corrected by editing. Note was updated later by me after  physical examination and  completion of the assessment.

## 2022-07-02 NOTE — PROGRESS NOTES
Pulmonary Critical Care Progress Note    Patient seen for the follow up of Acute pulmonary embolism ([address]     Subjective:    She has been ambulating to the bathroom. She has severe left leg pain requesting pain medicine. No shortness of breath at rest.  She has no chest pain. She is on room air. Examination:    Vitals: BP (!) 140/56   Pulse 88   Temp 98.4 °F (36.9 °C) (Oral)   Resp 16   Ht 5' 5\" (1.651 m)   Wt 225 lb (102.1 kg)   SpO2 99%   BMI 37.44 kg/m²   SpO2  Av.5 %  Min: 93 %  Max: 99 %  General appearance: alert and cooperative with exam  Neck: No JVD  Lungs: Mildly decreased breath sound no crackles or wheezing  Heart: regular rate and rhythm, S1, S2 normal, no gallop  Abdomen: Soft, non tender, + BS  Extremities: no cyanosis or clubbing.significant edema left lower extremity clean dressing over knee    LABs:    CBC:   Recent Labs     22  0405   WBC 12.9* 13.4*   HGB 10.3* 10.1*   HCT 32.9* 31.8*    359     BMP:   Recent Labs     22  0522  0405    140   K 3.9 3.8   CO2 27 25   BUN 18 13   CREATININE 0.99* 0.95*   LABGLOM 56* 59*   GLUCOSE 135* 103*     PT/INR:   Recent Labs     22  0521   PROTIME 20.7* 18.0*   INR 1.8 1.5     APTT:  Recent Labs     22  0405   APTT 80.4* 100.3*     LIVER PROFILE:  Recent Labs     22   AST 22   ALT 16   LABALBU 4.3       Radiology:    Echocardiogram   Summary  Left ventricle is normal in size  Global left ventricular systolic function is normal  Estimated ejection fraction is 65 % . No significant valvular regurgitation or stenosis seen. No pericardial effusion seen. Normal   aortic root dimension. CTA chest  1. Few scattered subsegmental pulmonary emboli are noted predominately within   the right upper lobe and left lower lobe. 2. No other acute cardiopulmonary findings.        Impression:    · Acute pulmonary emboli  · Acute DVT left lower extremity status left total knee replacement on 6/20/2022  · Obstructive sleep apnea/Obesity, on CPAP therapy  · Arthritis, CAD, CHF, fibromyalgia, GERD HLD, HTN    Recommendations:  · Oxygen by nasal cannula  · Home CPAP while asleep  · Incentive spirometry every hour while awake  · Heparin drip  · IV hydration  · Pain control per primary team  · Check echo  · Vascular evaluation  · Noted input about Xarelto from Dr. Tessie Watson  · DVT prophylaxis, IV heparin.      Clark Wheeler MD, MD, [facility]  Pulmonary Critical Care and Sleep Medicine,  [facility]  Cell: [phone]  Office: [phone]

## 2022-07-02 NOTE — CONSULTS
VASCULAR SURGERY   CONSULT        Name: Husam Gaitan  MRN: [de-identified]     [place]: [de-identified]  Room: [address] [place], [place] [address]    Admit Date: 6/30/2022  PCP: Delia Foreman MD    Physician Requesting Consult:  Dr. Bernard Traylor    Reason for Consult:  Lt leg pain    Chief Complaint:     Chief Complaint   Patient presents with    Leg Pain     had knee surgery on 6/20. had doppler on 6/27 which showed blood clot in left leg. started on xarelto. History Obtained From:     patient, electronic medical record and nursing    History of Present Illness:      Husam Gaitan is a  79 y.o.  female who presents with Leg Pain (had knee surgery on 6/20. had doppler on 6/27 which showed blood clot in left leg. started on xarelto.)  Patient underwent left total knee arthroplasty on 6/20/2022. Post procedure developed leg pain was found to have an acute left lower extremity deep venous thrombosis below the popliteal level by report. States he has pain in the medial aspect of the lower leg. CT scan of the chest also demonstrated small bilateral pulmonary emboli. Patient denies any chest pain or shortness of breath. Initially she was started on Xarelto as an outpatient however was admitted due to increased pain in the left leg. IV heparin.     Past Medical History:     Past Medical History:   Diagnosis Date    Arthritis     CAD (coronary artery disease)     CHF (congestive heart failure) ([facility] [address]     pt has not been diagnosed however on medications for her heart and a diuretic, assumes CHF    CPAP (continuous positive airway pressure) dependence     DJD (degenerative joint disease)     left ankle, left knee, rght knee    Fibromyalgia     GERD (gastroesophageal reflux disease)     Hyperlipidemia     Hypertension     Mitral valve regurgitation     Seasonal allergies     Sleep apnea     Spinal stenosis     Thyroid disease     hashimotos        Past Surgical History:     Past Surgical History:   Procedure Laterality Date    ANKLE SURGERY Left     x3    BREAST BIOPSY Right     DENTAL SURGERY      pt has permanant implanted dentures    HYSTERECTOMY (CERVIX STATUS UNKNOWN)      KNEE ARTHROSCOPY Left     LYMPHADENECTOMY Right     axilla, not cancer    PAIN MANAGEMENT PROCEDURE      back ablations, last @/2022    TOTAL KNEE ARTHROPLASTY Left 6/20/2022    LEFT KNEE TOTAL ARTHROPLASTY - CONFORMIS performed by Venita Westbrook MD at [address] [facility]        Medications Prior to Admission:       Prior to Admission medications    Medication Sig Start Date End Date Taking? Authorizing Provider   Menaquinone-7 (VITAMIN K2 PO) Take by mouth daily   Yes Historical Provider, MD   vitamin D3 (CHOLECALCIFEROL) 10 MCG (400 UNIT) TABS tablet Take 400 Units by mouth daily   Yes Historical Provider, MD   XARELTO 15 MG TABS tablet Take 15 mg by mouth 2 times daily (with meals)  6/27/22   Historical Provider, MD   Probiotic Product (PROBIOTIC ACIDOPHILUS BEADS) CAPS Take by mouth    Historical Provider, MD   gabapentin (NEURONTIN) 300 MG capsule Take 300 mg by mouth 3 times daily.      Historical Provider, MD   aspirin 325 MG tablet Take 325 mg by mouth 2 times daily 60 tablets then discontinue    Historical Provider, MD   amitriptyline (ELAVIL) 50 MG tablet Take 50 mg by mouth nightly     Historical Provider, MD   tiZANidine (ZANAFLEX) 4 MG tablet Take 4 mg by mouth every 12 hours     Historical Provider, MD   coenzyme Q10 200 MG CAPS capsule Take 1 capsule by mouth daily 6/6/20   Historical Provider, MD   dilTIAZeigor [facility]) 240 MG extended release capsule Take 240 mg by mouth daily 8/2/21   Historical Provider, MD   furosemide (LASIX) 20 MG tablet Take 20 mg by mouth three times a week  12/3/21   Historical Provider, MD   liothyronine (CYTOMEL) 5 MCG tablet Take 5 mcg by mouth daily  4/24/13   Historical Provider, MD   losartan (COZAAR) 100 MG tablet Take 1 tablet by mouth daily 12/13/21   Historical Provider, MD   metoprolol succinate (TOPROL XL) 200 MG extended release tablet Take 200 mg by mouth daily  5/18/21   Historical Provider, MD   oxyCODONE-acetaminophen (PERCOCET) 5-325 MG per tablet Take 1 tablet by mouth every 4 hours as needed. 12/3/18   Historical Provider, MD   potassium chloride (KLOR-CON) 10 MEQ extended release tablet Take 10 mEq by mouth as needed With lasix    Historical Provider, MD   omeprazole (PRILOSEC) 40 MG delayed release capsule Take 40 mg by mouth daily  4/24/13   Historical Provider, MD   rizatriptan (MAXALT) 5 MG tablet Take 5 mg by mouth once as needed  5/29/20   Historical Provider, MD   simvastatin (ZOCOR) 10 MG tablet Take 10 mg by mouth nightly  5/17/21   Historical Provider, MD   levothyroxine (SYNTHROID) 88 MCG tablet Take 88 mcg by mouth Daily  4/24/13   Historical Provider, MD   zolpidem (AMBIEN CR) 6.25 MG extended release tablet Take 1 tablet by mouth every evening. 12/13/21   Historical Provider, MD   Multiple Vitamins-Minerals (MULTIVITAMIN ADULTS PO) Take 1 tablet by mouth daily    Historical Provider, MD   denosumab (PROLIA) 60 MG/ML SOSY SC injection Inject 60 mg into the skin once August & February    Historical Provider, MD        Allergies:       Amoxicillin    Social History:     Tobacco:    reports that she has never smoked. She has never used smokeless tobacco.  Alcohol:      reports no history of alcohol use. Drug Use:  reports no history of drug use. Family History:     No family history on file.     Review of Systems:     Positive and Negative as described in HPI    Constitutional:  negative for  fevers, chills, sweats, fatigue, and weight loss  HEENT:  negative for vision or hearing changes,   Respiratory:  negative for shortness of breath, cough, or congestion  Cardiovascular:  negative for  chest pain, palpitations  Gastrointestinal:  negative for nausea, vomiting, diarrhea, constipation, abdominal pain  Genitourinary:  negative for frequency, dysuria  Integument/Breast:  negative for rash, skin lesions  Musculoskeletal:  negative for muscle aches or joint pain  Neurological:  negative for headaches, dizziness, lightheadedness, numbness, pain and tingling extremities  Behavior/Psych:  negative for depression and anxiety    Code Status:  Full Code    Physical Exam:     Vitals:  BP (!) 143/76   Pulse 88   Temp 97.7 °F (36.5 °C) (Oral)   Resp 16   Ht 5' 5\" (1.651 m)   Wt 225 lb (102.1 kg)   SpO2 94%   BMI 37.44 kg/m²   Temp (24hrs), Av.4 °F (36.9 °C), Min:97.7 °F (36.5 °C), Max:98.8 °F (37.1 °C)      General appearance - alert, well appearing and in no acute distress  Mental status - oriented to person, place and time with normal affect  Head - normocephalic and atraumatic  Eyes - pupils equal and reactive, extraocular eye movements intact, conjunctiva clear  Ears - hearing appears to be intact  Nose - no drainage noted  Mouth - mucous membranes moist  Neck - supple, no carotid bruits, thyroid not palpable, no JVD  Chest - clear to auscultation, normal effort  Heart - normal rate, regular rhythm, no murmurs  Abdomen - soft, obese, non-tender, non-distended, bowel sounds present all four quadrants, no masses, hepatomegaly, splenomegaly or aortic enlargement  Neurological - normal speech, no focal findings or movement disorder noted, cranial nerves II through XII grossly intact  Extremities - peripheral pulses palpable, left lower extremity edema, knee dressing intact no calf tenderness  Skin - no gross lesions, rashes, or induration noted      Data:     Hematology:  Recent Labs     22  0521 22  1204 22  2206 22  0405   WBC  --   --  12.9*  --   --  13.4*   RBC  --   --  3.33*  --   --  3.26*   HGB  --   --  10.3*  --   --  10.1*   HCT  --   --  32.9*  --   --  31.8*   MCV  --   --  98.8  --   --  97.5   MCH  --   --  30.9  --   --  31.0   MCHC  --   --  31.3  --   --  31.8   RDW  --   --  12.9  --   --  13.1   PLT  --   --  394  --   --  359   MPV --   --  9.0  --   --  9.0   SEGS  --   --  66*  --   --  66*   LYMPHOPCT  --   --  21*  --   --  24   MONOPCT  --   --  10  --   --  8   EOSRELPCT  --   --  1  --   --  1   BASOPCT  --   --  1  --   --  1   PROTIME 20.7*  --  18.0*  --   --   --    APTT 41.6*   < >  --  138.0* 80.4* 100.3*   INR 1.8  --  1.5  --   --   --     < > = values in this interval not displayed. Chemistry:  Recent Labs     06/30/22 2022 07/01/22  0521 07/02/22  0405    137 140   K 4.8 3.9 3.8   CL 99 100 103   CO2 27 27 25   GLUCOSE 117* 135* 103*   BUN 21 18 13   CREATININE 1.13* 0.99* 0.95*   ANIONGAP 10 10 12   LABGLOM 48* 56* 59*   GFRAA 58* >60 >60   CALCIUM 9.7 9.4 9.1   TROPHS 13  --   --      Recent Labs     06/30/22 2022   PROT 7.3   LABALBU 4.3   AST 22   ALT 16   ALKPHOS 90   BILITOT 0.25*     Glucose:No results for input(s): LABA1C, POCGLU, LABINSU in the last 72 hours. Assessment:     Primary Problem  Acute pulmonary embolism ([facility] [address]  3 71-year-old female with left lower leg DVT leg pain status post left total knee arthroplasty 6/20/2022  2. Small bilateral pulm emboli, currently asymptomatic    Active Hospital Problems    Diagnosis Date Noted    Acute pulmonary embolism ([address] [I26.99] 06/30/2022     Priority: Medium       Plan:     1. Transition to Xarelto which she will need to be on for approximately 6 months  2. Fit with 30 to 40 mmHg knee-high [employer] stockings  3.  [id number] Kyra Munoz for discharge from vascular standpoint      Electronically signed by Fredrick Lam MD on 7/2/2022 at 1:30 PM     Copy sent to Dr. Nely Boyce MD

## 2022-07-02 NOTE — PROGRESS NOTES
All patient belongings collected. IV and telemetry removed. Discharge paperwork given and explained to patient regarding new prescriptions and follow up appointments. Patient wheeled out to private auto via staff. See discharge event for further details.

## 2022-07-04 NOTE — DISCHARGE SUMMARY
[facility].,    Adult Hospitalist      Patient ID: Elinore Bence  MRN: [de-identified]     Acct:  [de-identified]       Patient's PCP: Yolanda Lugo MD    Admit Date: 6/30/2022     Discharge Date: 7/2/2022      Admitting Physician: Dajuan Solomon MD    Discharge Physician: Dajuan Solomon MD     CONSULTANTS: Patient Care Team:  Yolanda Lugo MD as PCP - General (Family Medicine)  Nnamdi Madera RN as Care Transitions Nurse    PROCEDURES PERFORMED:     Active Discharge Diagnoses:  · Acute pulmonary embolism  · Left leg deep vein thrombosis  · Status post left knee total arthroplasty dated 6/20/2022  · Essential hypertension  · Hypothyroidism  · Mixed hyperlipidemia  · Osteoporosis  · Gastroesophageal reflux disease without esophagitis  · Insomnia  · Obesity with BMI 37.4  · Leg cramps         Primary Problem  Acute pulmonary embolism [facility])    Hospital Course: Patient admitted with a left leg deep vein thrombosis and acute subsegmental pulmonary emboli after undergoing a left knee total arthroplasty on 6/20/2022. Patient was diagnosed as outpatient and had been started on Xarelto. However patient complained of severe pain, nausea and diaphoresis after which she was admitted and started on heparin infusion. Vascular surgery were consulted. Patient was given tizanidine as needed in addition. Patient was then resumed back on Xarelto    The plan was discussed in detail with patient who agreed with the plan and verbalized understanding . The patient was seen and examined on day of discharge and this discharge summary is in conjunction with any daily progress note from day of discharge. INitial HPI  Elinore Bence is a 79 y.o.  female who presents with Leg Pain (had knee surgery on 6/20. had doppler on 6/27 which showed blood clot in left leg. started on xarelto.)     Patient admitted from the emergency room where she presented with complaints of pain in her left leg.   Patient underwent left total knee arthroplasty on 6/20/2022 and later developed a left DVT. She was started on Xarelto on 6/27/2022. Patient says for the last several days she has been having more pain in her left leg. She also complained of some dizziness on bending over though denied having any chest pain, cough or wheezing. CTA chest showed 3 subsegmental pulmonary emboli. Patient denies any significant dyspnea or orthopnea. Denies fever or chills.     Patient complains of a continuous cramp in her left calf since for several days now. Patient says she has been wearing elastic stockings continuously since surgery. She has seen a vascular surgeon and was being monitored on Xarelto. She states she took her last dose with dinner around 5 PM.     Denies headache, blurred vision or neck pain. Denies back pain or dysuria. Denies vomiting, diarrhea or constipation     I have personally reviewed the past medical history, past surgical history, medications, social history, and family history, and summarized in the note. Hospital Data:    Labs:    Hematology:  Recent Labs     07/02/22  0405   WBC 13.4*   RBC 3.26*   HGB 10.1*   HCT 31.8*   MCV 97.5   MCH 31.0   MCHC 31.8   RDW 13.1      MPV 9.0     Chemistry:  Recent Labs     07/02/22  0405      K 3.8      CO2 25   GLUCOSE 103*   BUN 13   CREATININE 0.95*   ANIONGAP 12   LABGLOM 59*   GFRAA >60   CALCIUM 9.1     No results for input(s): PROT, LABALBU, LABA1C, E7KGFLL, K4IIQPH, FT4, TSH, AST, ALT, LDH, GGT, ALKPHOS, LABGGT, BILITOT, BILIDIR, AMMONIA, AMYLASE, LIPASE, LACTATE, CHOL, HDL, LDLCHOLESTEROL, CHOLHDLRATIO, TRIG, VLDL, JJV96GU, PHENYTOIN, PHENYF, URICACID, POCGLU in the last 72 hours.   Lab Results   Component Value Date    INR 1.5 07/01/2022    INR 1.8 06/30/2022    INR 0.9 05/26/2022    PROTIME 18.0 (H) 07/01/2022    PROTIME 20.7 (H) 06/30/2022    PROTIME 12.2 05/26/2022     Lab Results   Component Value Date/Time    SPECIAL NOT REPORTED 12/17/2014 10:46 AM    SPECIAL NOT REPORTED 12/17/2014 10:46 AM    SPECIAL NOT REPORTED 12/17/2014 10:46 AM     Lab Results   Component Value Date/Time    CULTURE NORMAL GIULIA 12/17/2014 10:46 AM    CULTURE NO ANAEROBIC ORGANISMS ISOLATED AT 5 DAYS (A) 12/17/2014 10:46 AM    CULTURE  12/17/2014 10:46 AM     [address] [place] [address] [address] [place] [address] [phone]    CULTURE NO GROWTH 32 DAYS 12/17/2014 10:46 AM    CULTURE  12/17/2014 10:46 AM     [address] [place] [address] [address] [place] [address], [address] [place] [address] [phone]    CULTURE NO GROWTH 46 DAYS 12/17/2014 10:46 AM    CULTURE  12/17/2014 10:46 AM     [address] [place] [address] [phone]       No results found for: POCPH, PHART, PH, POCPCO2, MOD8GLC, PCO2, POCPO2, PO2ART, PO2, POCHCO3, HOT8LWC, HCO3, NBEA, PBEA, BEART, BE, THGBART, THB, DFP5HNZ, MOFZ9ANU, M4BMIFHX, O2SAT, FIO2    Radiology:    CT CHEST PULMONARY EMBOLISM W CONTRAST    Result Date: 7/1/2022  1. Few scattered subsegmental pulmonary emboli are noted predominately within the right upper lobe and left lower lobe. 2. No other acute cardiopulmonary findings. Critical results were called by Dr. Megan Harris. Maritza Turner MD to Lenton Meigs, CNP on 6/30/2022 at 21:40. All radiological studies reviewed      Reviews of Symptoms:    A 10 point system is reviewed and  negative except described in hospital course    Physical Exam:    Vitals:  BP (!) 144/67   Pulse 90   Temp 98.2 °F (36.8 °C) (Oral)   Resp 16   Ht 5' 5\" (1.651 m)   Wt 225 lb (102.1 kg)   SpO2 94%   BMI 37.44 kg/m²   No data recorded.       General appearance - alert, well appearing, and in no acute distress  Mental status - oriented to person, place, and time with normal affect  Head - normocephalic and atraumatic  Eyes - pupils equal and reactive, extraocular eye movements intact, conjunctiva clear  Ears - hearing appears to be intact  Nose - no drainage noted  Mouth - mucous membranes moist  Neck - supple, no carotid bruits, thyroid not palpable  Chest - clear to auscultation, normal effort  Heart - normal rate, regular rhythm, no murmur  Abdomen - soft, nontender, nondistended, bowel sounds present all four quadrants, no masses, hepatomegaly or splenomegaly  Neurological - normal speech, no focal findings or movement disorder noted, cranial nerves II through XII grossly intact  Extremities - peripheral pulses palpable, no pedal edema or calf pain with palpation. Left lower extremity elastic stockings  Skin - no gross lesions, rashes, or induration noted      Consults:  IP CONSULT TO PULMONOLOGY  IP CONSULT TO HOSPITALIST  IP CONSULT TO VASCULAR SURGERY    Disposition: Home    Discharged Condition: Stable    Follow Up: Dustin Maguire MD  [facility]   The [address] [place] [address] [address] [facility]  [phone]    On 7/6/2022  Follow up post op appt 7-6 at 315pm.    Roberto Murillo MD  [address] [place] [address]    In 2 weeks      Delia Foreman MD  [facility] [facility] [phone]    Schedule an appointment as soon as possible for a visit in 2 weeks  Follow up Appointment            Diet: No diet orders on file    Discharge Medications:      Medication List      START taking these medications    JOBST KNEE HIGH COMPRESSION SM Misc  1 each by Does not apply route daily as needed (dvt)        CHANGE how you take these medications    * Xarelto 15 MG Tabs tablet  Generic drug: rivaroxaban  Take 1 tablet by mouth 2 times daily (with meals) for 18 days And then take Xarelto 20 mg once a day  What changed: additional instructions     * rivaroxaban 20 MG Tabs tablet  Commonly known as: Xarelto  Take 1 tablet by mouth daily (with breakfast)  Start taking on: July 20, 2022  What changed: You were already taking a medication with the same name, and this prescription was added. Make sure you understand how and when to take each.          * This list has 2 medication(s) that are the same as other medications prescribed for you. Read the directions carefully, and ask your doctor or other care provider to review them with you.             CONTINUE taking these medications    amitriptyline 50 MG tablet  Commonly known as: ELAVIL     coenzyme Q10 200 MG Caps capsule     dilTIAZem 240 MG extended release capsule  Commonly known as: TIAZAC     furosemide 20 MG tablet  Commonly known as: LASIX     gabapentin 300 MG capsule  Commonly known as: NEURONTIN     levothyroxine 88 MCG tablet  Commonly known as: SYNTHROID     liothyronine 5 MCG tablet  Commonly known as: CYTOMEL     losartan 100 MG tablet  Commonly known as: COZAAR     metoprolol succinate 200 MG extended release tablet  Commonly known as: TOPROL XL     MULTIVITAMIN ADULTS PO     omeprazole 40 MG delayed release capsule  Commonly known as: PRILOSEC     oxyCODONE-acetaminophen 5-325 MG per tablet  Commonly known as: PERCOCET     potassium chloride 10 MEQ extended release tablet  Commonly known as: KLOR-CON     Probiotic Acidophilus Beads Caps     Prolia 60 MG/ML Sosy SC injection  Generic drug: denosumab     rizatriptan 5 MG tablet  Commonly known as: MAXALT     simvastatin 10 MG tablet  Commonly known as: ZOCOR     tiZANidine 4 MG tablet  Commonly known as: ZANAFLEX     vitamin D3 10 MCG (400 UNIT) Tabs tablet  Commonly known as: CHOLECALCIFEROL     zolpidem 6.25 MG extended release tablet  Commonly known as: AMBIEN CR        STOP taking these medications    aspirin 325 MG tablet     VITAMIN K2 PO           Where to Get Your Medications      These medications were sent to [facility] [id number], [address] [place] [address], [address] [place] [address]    Phone: [phone]   · [facility] HIGH [facility]     You can get these medications from any pharmacy    Bring a paper prescription for each of these medications  · rivaroxaban 20 MG Tabs tablet  · Xarelto 15 MG Tabs tablet Code Status:  Prior    Time Spent on discharge is  35 mins in patient examination, evaluation, counseling as well as medication reconciliation, prescriptions for required medications, discharge plan and follow up. Electronically signed by Merritt Collier MD on 7/4/2022 at 4:40 PM     Thank you Dr. Refugio Bernal MD for the opportunity to be involved in this patient's care. This note was created with the assistance of a speech-recognition program.  Although the intention is to generate a document that actually reflects the content of the visit, no guarantees can be provided that every mistake has been identified and corrected by editing. Note was updated later by me after  physical examination and  completion of the assessment.

## 2022-07-05 ENCOUNTER — CARE COORDINATION (OUTPATIENT)
Dept: CASE MANAGEMENT | Age: 67
End: 2022-07-05

## 2022-07-05 NOTE — CARE COORDINATION
[facility] Transitions of Care Initial Call    2022      Patient Name: Ramos Love         Patient : 1955     Discharge Date: 22    RARS: Readmission Risk Score: Readmission Risk Score: 9.9 ( )    PCP: Nesha Smith MD      Discharge Facility: [facility]    Call within 2 business days of discharge: Yes    Challenges to be reviewed by the provider   Additional needs identified to be addressed with provider: No  none             Method of communication with provider : none      Advance Care Planning:   Does patient have an Advance Directive: reviewed and current. Was this a readmission? Yes    Patients top risk factors for readmission: medical condition-(+) DVT, Acute Pulminary Emboli    Care Transition Nurse (CTN) contacted the patient by telephone to perform post hospital discharge assessment. Verified name and  with patient as identifiers. Provided introduction to self, and explanation of the CTN role. CTN reviewed discharge instructions, medical action plan and red flags with patient who verbalized understanding. Patient given an opportunity to ask questions and does not have any further questions or concerns at this time. Were discharge instructions available to patient? Yes. Reviewed appropriate site of care based on symptoms and resources available to patient including: PCP  Specialist  When to call 911. The patient agrees to contact the Surgeons office for questions related to their healthcare. Medication reconciliation was performed with patient, who verbalizes understanding of administration of home medications. Advised obtaining a 90-day supply of all daily and as-needed medications. Reviewed and educated patient on any new and changed medications related to discharge diagnosis. Initial Assessment:    Edema: \"no worse\", using ice    Pain: \"I call 8:00 the witching hour because that is when it is worse\".      Pain Control Interventions: 1/2 Percocet every 4 hours on average. Calf Tenderness: yes  DVT Education Completed:Yes    SOB/CP: denies    Urinating/Bowels/Appetite: Denies nausea. OK  Constipation Prevention and Self Care Education Provided:Yes    Ability to care for basic needs; Shower, meal preparation: OK    Home Care/Out Patient Therapy: waiting on Dr. Juan F Bonilla to order, appointment tomorrow. Medication Changes/Questions: Medications reviewed. Follow Up Needs Assessment: (+) DVT-Assess, Therapy ordered? Jobst stockings obtained? No future appointments. Plan for follow-up call in 3-5 days based on severity of symptoms and risk factors.   Plan for next call: symptom management-assess  self management-assess

## 2022-07-07 ENCOUNTER — CARE COORDINATION (OUTPATIENT)
Dept: CASE MANAGEMENT | Age: 67
End: 2022-07-07

## 2022-07-07 NOTE — CARE COORDINATION
[facility] Transitions of Care Subsequent Call    2022      Patient Name: Kacie Erickson         Patient : 1955     Discharge Date: 22    RARS: Readmission Risk Score: Readmission Risk Score: 9.9 ( )    PCP: Conrado Omalley MD      Discharge Facility: [facility] to be reviewed by the provider   Additional needs identified to be addressed with provider: No  none             Method of communication with provider : none    Care Transition Nurse (CTN) contacted the patient by telephone to perform post hospital discharge assessment. Verified name and  with patient as identifiers. Provided introduction to self, and explanation of the CTN role. Patient given an opportunity to ask questions and does not have any further questions or concerns at this time. Assessment:  Incision:  \"looks good\"    Fever: denies     Edema: \"good\"- continues to use ice pack applications. Pain: Not so much from the knee as from the blood clot\"    Pain Control Interventions: 1 Percocet 3 times daily on average. Calf Tenderness: yes      SOB/CP: no    Urinating/Bowels/Appetite: OK      Ability to care for basic needs; Shower, meal preparation: OK    Home Care/Out Patient Therapy: denies need-has an exercise bike is using a cane. Medication Changes/Questions: denies-continues on Xarelto for DVT. Follow Up Needs Assessment: Is wearing compression stockings. No future appointments. Plan for follow-up call in 5-7 days based on severity of symptoms and risk factors.   Plan for next call: symptom management-assess  self management-assess

## 2022-07-12 ENCOUNTER — CARE COORDINATION (OUTPATIENT)
Dept: CASE MANAGEMENT | Age: 67
End: 2022-07-12

## 2022-07-12 NOTE — CARE COORDINATION
[facility] Transitions of Care Subsequent Call    2022      Patient Name: Ruperto Downey         Patient : 1955     Discharge Date: 22    RARS: Readmission Risk Score: Readmission Risk Score: 9.9 ( )    PCP: Alana Camarillo MD      Discharge Facility: [facility] to be reviewed by the provider   Additional needs identified to be addressed with provider: No  none             Method of communication with provider : none    Care Transition Nurse (CTN) contacted the patient by telephone to perform post hospital discharge assessment. Verified name and  with patient as identifiers. Provided introduction to self, and explanation of the CTN role. Patient given an opportunity to ask questions and does not have any further questions or concerns at this time. Assessment:  Incision:  Healing well    Fever: denies    Edema: swells as the day goes on, is gone in the am.    Pain: \"no pain from the joint-is related to the DVT:  6\" on pain scale 0-10    Pain Control Interventions: 1 Percocet tid on average. Calf Tenderness: yes rated as stated. SOB/CP: denies    Urinating/Bowels/Appetite: OK      Ability to care for basic needs; Shower, meal preparation: yes    Home Care/Out Patient Therapy: on her own    Medication Changes/Questions: denies      Follow Up Needs Assessment: DVT resolving? No future appointments. Plan for follow-up call in 5-7 days based on severity of symptoms and risk factors.   Plan for next call: symptom management-assess  self management-assess

## 2022-07-19 ENCOUNTER — CARE COORDINATION (OUTPATIENT)
Dept: CASE MANAGEMENT | Age: 67
End: 2022-07-19

## 2022-07-19 NOTE — CARE COORDINATION
[facility] Transitions of Care Subsequent Call    2022      Patient Name: Abdirizak Garcia         Patient : 1955     Discharge Date: 22    RARS: Readmission Risk Score: Readmission Risk Score: 9.9 ( )    PCP: Lydia Torres MD      Discharge Facility: [facility]    Unable to reach for [facility] follow up call. Left message requesting call back at the earliest convenience. Follow Up Needs Assessment:    No future appointments.   Latrell Greenberg RN BSN   Care Transitions Nurse  [phone]

## 2022-07-22 ENCOUNTER — CARE COORDINATION (OUTPATIENT)
Dept: CASE MANAGEMENT | Age: 67
End: 2022-07-22

## 2022-07-22 NOTE — CARE COORDINATION
[facility] Transitions of Care Subsequent Call    2022      Patient Name: Ruperto Downey         Patient : 1955     Discharge Date: 22    RARS: Readmission Risk Score: Readmission Risk Score: 9.9 ( )    PCP: Alana Camarillo MD      Discharge Facility: [facility] contacted for [facility] f/u call. Pt stated she is doing well, had blood work yesterday and bleeding has decreased. Stated she still has occ \"wicked\" cramping in leg from DVT, takes Percocet as needed. Pt was told by physician that while DVT is dissolving she can expect to have periodic pain and cramping. Pt is exercising on her own, using stationary bike and now able to negotiate steps one at a time instead of resting both feet on each step. Challenges to be reviewed by the provider   Additional needs identified to be addressed with provider: No  none             Method of communication with provider : none    Care Transition Nurse (CTN) contacted the patient by telephone to perform post hospital discharge assessment. Verified name and  with patient as identifiers. Provided introduction to self, and explanation of the CTN role. Patient given an opportunity to ask questions and does not have any further questions or concerns at this time. Assessment:    Edema: improving    Pain: occ cramping r/t DVT    Pain Control Interventions: Percocet prn    Calf Tenderness: yes, with pain and cramping r/t DVT hx      SOB/CP: no    Urinating/Bowels/Appetite: wnl      Ability to care for basic needs; Shower, meal preparation: yes    Home Care/Out Patient Therapy: no, doing exercises on her own    Medication Changes/Questions: none      Follow Up Needs Assessment:    No future appointments. 7-10 days  based on severity of symptoms and risk factors. Plan for next call: symptom management-calf pain, blood work, percocet still?     Binu Barahona RN BSN   Care Transitions Nurse  [phone]

## 2022-08-02 ENCOUNTER — CARE COORDINATION (OUTPATIENT)
Dept: CASE MANAGEMENT | Age: 67
End: 2022-08-02

## 2022-08-02 NOTE — CARE COORDINATION
[facility] Transitions of Care Subsequent Call    2022      Patient Name: Clementina Sanders         Patient : 1955     Discharge Date: 22    RARS: Readmission Risk Score: Readmission Risk Score: 9.9 ( )    PCP: Urban Leggett MD      Discharge Facility: [facility] to be reviewed by the provider   Additional needs identified to be addressed with provider: No  none             Method of communication with provider : phone    Care Transition Nurse (CTN) contacted the patient by telephone to perform post hospital discharge assessment. Verified name and  with patient as identifiers. Provided introduction to self, and explanation of the CTN role. Patient given an opportunity to ask questions and does not have any further questions or concerns at this time. Assessment:  Incision:  healing well    Fever: denies    Edema: \"a little\"    Pain: occasional-has pain management for her back pain-takes Percocet for that. Calf Tenderness: denies      SOB/CP: denies    Urinating/Bowels/Appetite: All OK      Ability to care for basic needs; Shower, meal preparation: good    Home Care/Out Patient Therapy: Independent    Medication Changes/Questions: denies      Follow Up Needs Assessment:    No future appointments. Call in 2 weeks  based on severity of symptoms and risk factors.   Plan for next call: symptom management-assess  self management-assess

## 2022-08-03 NOTE — PROGRESS NOTES
Physician Progress Note      Amaris Bustos  CSN #:                  [id number]  :                       1955  ADMIT DATE:       2022 7:39 PM  [facility] DATE:        2022 7:20 PM  RESPONDING  PROVIDER #:        Lars Jason MD          QUERY TEXT:    Pt admitted with LLE DVT and PE and had undergone left TKA on 22.? If   possible, please document in progress notes: The medical record reflects the following:  Risk Factors: left TKA on 2022, obese, never smoker  Clinical Indicators: Per  ED HPI:  Patient states that she had left knee   surgery on 22 and she developed left calf pain afterward. She called his   office and was told that it was normal and he put her on gabapentin. She   continued to have calf pain and saw her rheumatologist. An ultrasound was   ordered and the patient was found to have a DVT below the left knee. She was   placed on Xarelto. She continued to have some pain in the left calf.  CT   chest:  Few scattered subsegmental pulmonary emboli are noted predominately   within the right upper lobe and left lower lobe. Treatment: admission, heparin gtt followed by Eliquis  Options provided:  -- LLE DVT and PE are postoperative complications  -- LLE DVT and PE are not a postoperative complication, but is due to other   incidental risk factor, Please specify other incidental risk factor. -- Other - I will add my own diagnosis  -- Disagree - Not applicable / Not valid  -- Disagree - Clinically unable to determine / Unknown  -- Refer to Clinical Documentation Reviewer    PROVIDER RESPONSE TEXT:    Patient has a LLE DVT and PE as postoperative complications.     Query created by: Lili Carbone on 2022 7:56 AM      Electronically signed by:  Lars Jason MD 2022 9:43 PM

## 2022-08-16 ENCOUNTER — CARE COORDINATION (OUTPATIENT)
Dept: CASE MANAGEMENT | Age: 67
End: 2022-08-16

## 2022-08-16 NOTE — CARE COORDINATION
[facility] Transitions of Care Subsequent Call    2022      Patient Name: Abimael Jo         Patient : 1955     Discharge Date: 22    RARS: Readmission Risk Score: Readmission Risk Score: 9.9 ( )    PCP: Leighann Vargas MD      Discharge Facility: [facility]      Attempted to contact Angi Padgett for [address] follow up call. Left HIPAA compliant VM requesting call back at the earliest convenience. Plan for follow-up call in 3-5 days based on severity of symptoms and risk factors.   Plan for next call: symptom management-assess  self management-assess      Hussain Garcia RN BSN   Care Transitions Nurse  [phone]

## 2022-08-19 ENCOUNTER — CARE COORDINATION (OUTPATIENT)
Dept: CASE MANAGEMENT | Age: 67
End: 2022-08-19

## 2022-08-19 NOTE — CARE COORDINATION
[facility] Transitions of Care Subsequent Call    2022    Attempt #2 to contact patient for [facility] follow up Left HIPAA compliant voicemail requesting call back. Patient Name: Calista Braun         Patient : 1955     Discharge Date: 22    RARS: Readmission Risk Score: Readmission Risk Score: 9.9 ( )    PCP: Layla Jessica MD      Discharge Facility: [address] [place] [address] for follow-up call in 5-7 days based on severity of symptoms and risk factors.   Plan for next call: symptom management-assess  self management-assess      Jac Gibson, RN BSN   Care Transitions Nurse  [phone]

## 2022-08-25 ENCOUNTER — CARE COORDINATION (OUTPATIENT)
Dept: CASE MANAGEMENT | Age: 67
End: 2022-08-25

## 2022-08-25 NOTE — CARE COORDINATION
CC[facility] Transitions of Care Subsequent Call    2022      Patient Name: Cedric Pollack         Patient : 1955     Discharge Date: 22    RARS: Readmission Risk Score: Readmission Risk Score: 9.9 ( )    PCP: Genet Conrad MD      Discharge Facility: [facility] to be reviewed by the provider   Additional needs identified to be addressed with provider: No  none             Method of communication with provider : none    Care Transition Nurse (CTN) contacted the patient by telephone to perform post hospital discharge assessment. Verified name and  with patient as identifiers. Provided introduction to self, and explanation of the CTN role. Patient given an opportunity to ask questions and does not have any further questions or concerns at this time. I woke Jasiel Sherly up with this call, states she is doing OK. Denies question, concern regarding her health, medications. Will continue to follow. No future appointments. Plan for follow-up call in 5-7 days based on severity of symptoms and risk factors.   Plan for next call: symptom management-assess  self management-assess

## 2022-09-08 ENCOUNTER — CARE COORDINATION (OUTPATIENT)
Dept: CASE MANAGEMENT | Age: 67
End: 2022-09-08

## 2022-09-08 NOTE — CARE COORDINATION
[facility] Transitions of Care Subsequent Call    2022      Patient Name: Azam Oakes         Patient : 1955     Discharge Date: 22    RARS: Readmission Risk Score: Readmission Risk Score: 9.9 ( )    PCP: Jh Hernandez MD      Discharge Facility: [facility] to be reviewed by the provider   Additional needs identified to be addressed with provider: No  none             Method of communication with provider : none    Care Transition Nurse (CTN) contacted the patient by telephone to perform post hospital discharge assessment. Verified name and  with patient as identifiers. Provided introduction to self, and explanation of the CTN role. Patient given an opportunity to ask questions and does not have any further questions or concerns at this time. Assessment:  I woke Pretty up with this call. She states that her pain is, \"not too bad\",  follows with pain management for back pain and states that Percocet controls her overall pain. Medication Changes/Questions: Denies further questions, concerns regarding her health, wellness, medications. Care Transitions Episode Closed. Follow Up Needs Assessment:    No future appointments. No further follow-up call indicated based on severity of symptoms and risk factors. Plan for next call:  CCJR Episode Resolved.

## 2023-04-06 ENCOUNTER — HOSPITAL ENCOUNTER (OUTPATIENT)
Dept: PREADMISSION TESTING | Age: 68
Discharge: HOME | End: 2023-04-06
Payer: MEDICARE

## 2023-04-06 VITALS
BODY MASS INDEX: 39.15 KG/M2 | HEIGHT: 65 IN | WEIGHT: 235 LBS | HEART RATE: 71 BPM | OXYGEN SATURATION: 97 % | SYSTOLIC BLOOD PRESSURE: 149 MMHG | DIASTOLIC BLOOD PRESSURE: 60 MMHG | RESPIRATION RATE: 16 BRPM | TEMPERATURE: 97.1 F

## 2023-04-06 LAB
ABO/RH: NORMAL
ABSOLUTE EOS #: 0.16 K/UL (ref 0–0.44)
ABSOLUTE IMMATURE GRANULOCYTE: 0.02 K/UL (ref 0–0.3)
ABSOLUTE LYMPH #: 1.91 K/UL (ref 1.1–3.7)
ABSOLUTE MONO #: 0.65 K/UL (ref 0.1–1.2)
ALBUMIN SERPL-MCNC: 4.4 G/DL (ref 3.5–5.2)
ALP SERPL-CCNC: 96 U/L (ref 35–104)
ALT SERPL-CCNC: 14 U/L (ref 5–33)
ANION GAP SERPL CALCULATED.3IONS-SCNC: 10 MMOL/L (ref 9–17)
ANTIBODY SCREEN: NEGATIVE
ARM BAND NUMBER: NORMAL
AST SERPL-CCNC: 17 U/L
BASOPHILS # BLD: 1 % (ref 0–2)
BASOPHILS ABSOLUTE: 0.07 K/UL (ref 0–0.2)
BILIRUB SERPL-MCNC: 0.3 MG/DL (ref 0.3–1.2)
BILIRUBIN URINE: NEGATIVE
BUN SERPL-MCNC: 13 MG/DL (ref 8–23)
BUN/CREAT BLD: 15 (ref 9–20)
CALCIUM SERPL-MCNC: 9.4 MG/DL (ref 8.6–10.4)
CHLORIDE SERPL-SCNC: 104 MMOL/L (ref 98–107)
CO2 SERPL-SCNC: 29 MMOL/L (ref 20–31)
COLOR: YELLOW
CREAT SERPL-MCNC: 0.89 MG/DL (ref 0.5–0.9)
EOSINOPHILS RELATIVE PERCENT: 3 % (ref 1–4)
EPITHELIAL CELLS UA: NORMAL /HPF (ref 0–5)
ERYTHROCYTE [SEDIMENTATION RATE] IN BLOOD BY WESTERGREN METHOD: 8 MM/HR (ref 0–30)
EXPIRATION DATE: NORMAL
GFR SERPL CREATININE-BSD FRML MDRD: >60 ML/MIN/1.73M2
GLUCOSE SERPL-MCNC: 115 MG/DL (ref 70–99)
GLUCOSE UR STRIP.AUTO-MCNC: NEGATIVE MG/DL
HCT VFR BLD AUTO: 39.8 % (ref 36.3–47.1)
HGB BLD-MCNC: 12.7 G/DL (ref 11.9–15.1)
IMMATURE GRANULOCYTES: 0 %
INR PPP: 0.9
KETONES UR STRIP.AUTO-MCNC: NEGATIVE MG/DL
LEUKOCYTE ESTERASE UR QL STRIP.AUTO: NEGATIVE
LYMPHOCYTES # BLD: 31 % (ref 24–43)
MCH RBC QN AUTO: 30 PG (ref 25.2–33.5)
MCHC RBC AUTO-ENTMCNC: 31.9 G/DL (ref 28.4–34.8)
MCV RBC AUTO: 94.1 FL (ref 82.6–102.9)
MONOCYTES # BLD: 11 % (ref 3–12)
NITRITE UR QL STRIP.AUTO: NEGATIVE
NRBC AUTOMATED: 0 PER 100 WBC
PARTIAL THROMBOPLASTIN TIME: 28.1 SEC (ref 23.9–33.8)
PDW BLD-RTO: 13.6 % (ref 11.8–14.4)
PLATELET # BLD AUTO: 266 K/UL (ref 138–453)
PMV BLD AUTO: 9.6 FL (ref 8.1–13.5)
POTASSIUM SERPL-SCNC: 3.4 MMOL/L (ref 3.7–5.3)
PROT SERPL-MCNC: 7.3 G/DL (ref 6.4–8.3)
PROT UR STRIP.AUTO-MCNC: 6 MG/DL (ref 5–8)
PROT UR STRIP.AUTO-MCNC: NEGATIVE MG/DL
PROTHROMBIN TIME: 11.6 SEC (ref 11.5–14.2)
RBC # BLD: 4.23 M/UL (ref 3.95–5.11)
RBC CLUMPS #/AREA URNS AUTO: NORMAL /HPF (ref 0–2)
SEG NEUTROPHILS: 54 % (ref 36–65)
SEGMENTED NEUTROPHILS ABSOLUTE COUNT: 3.37 K/UL (ref 1.5–8.1)
SODIUM SERPL-SCNC: 143 MMOL/L (ref 135–144)
SPECIFIC GRAVITY UA: 1.01 (ref 1–1.03)
TURBIDITY: CLEAR
URINE HGB: ABNORMAL
UROBILINOGEN, URINE: NORMAL
WBC # BLD AUTO: 6.2 K/UL (ref 3.5–11.3)
WBC UA: NORMAL /HPF (ref 0–5)

## 2023-04-06 PROCEDURE — 86901 BLOOD TYPING SEROLOGIC RH(D): CPT

## 2023-04-06 PROCEDURE — 87086 URINE CULTURE/COLONY COUNT: CPT

## 2023-04-06 PROCEDURE — 85610 PROTHROMBIN TIME: CPT

## 2023-04-06 PROCEDURE — 80053 COMPREHEN METABOLIC PANEL: CPT

## 2023-04-06 PROCEDURE — 93005 ELECTROCARDIOGRAM TRACING: CPT | Performed by: ORTHOPAEDIC SURGERY

## 2023-04-06 PROCEDURE — 36415 COLL VENOUS BLD VENIPUNCTURE: CPT

## 2023-04-06 PROCEDURE — 86850 RBC ANTIBODY SCREEN: CPT

## 2023-04-06 PROCEDURE — 86900 BLOOD TYPING SEROLOGIC ABO: CPT

## 2023-04-06 PROCEDURE — 81001 URINALYSIS AUTO W/SCOPE: CPT

## 2023-04-06 PROCEDURE — 87641 MR-STAPH DNA AMP PROBE: CPT

## 2023-04-06 PROCEDURE — 85025 COMPLETE CBC W/AUTO DIFF WBC: CPT

## 2023-04-06 PROCEDURE — 85652 RBC SED RATE AUTOMATED: CPT

## 2023-04-06 PROCEDURE — 85730 THROMBOPLASTIN TIME PARTIAL: CPT

## 2023-04-06 RX ORDER — ASCORBIC ACID 500 MG
1000 TABLET ORAL DAILY
COMMUNITY

## 2023-04-06 RX ORDER — ACETAMINOPHEN 500 MG
1000 TABLET ORAL ONCE
OUTPATIENT
Start: 2023-04-25

## 2023-04-06 RX ORDER — CELECOXIB 200 MG/1
200 CAPSULE ORAL ONCE
OUTPATIENT
Start: 2023-04-25

## 2023-04-06 RX ORDER — CLINDAMYCIN PHOSPHATE 900 MG/50ML
900 INJECTION INTRAVENOUS ONCE
OUTPATIENT
Start: 2023-04-25

## 2023-04-06 RX ORDER — GABAPENTIN 300 MG/1
300 CAPSULE ORAL ONCE
OUTPATIENT
Start: 2023-04-25

## 2023-04-06 ASSESSMENT — PROMIS GLOBAL HEALTH SCALE
IN THE PAST 7 DAYS, HOW WOULD YOU RATE YOUR PAIN ON AVERAGE [ON A SCALE FROM 0 (NO PAIN) TO 10 (WORST IMAGINABLE PAIN)]?: 7
IN THE PAST 7 DAYS, HOW WOULD YOU RATE YOUR FATIGUE ON AVERAGE [ON A SCALE FROM 1 (NONE) TO 5 (VERY SEVERE)]?: 3
WHO IS THE PERSON COMPLETING THE PROMIS V1.1 SURVEY?: 0
SUM OF RESPONSES TO QUESTIONS 2, 4, 5, & 10: 16
IN GENERAL, PLEASE RATE HOW WELL YOU CARRY OUT YOUR USUAL SOCIAL ACTIVITIES (INCLUDES ACTIVITIES AT HOME, AT WORK, AND IN YOUR COMMUNITY, AND RESPONSIBILITIES AS A PARENT, CHILD, SPOUSE, EMPLOYEE, FRIEND, ETC) [ON A SCALE OF 1 (POOR) TO 5 (EXCELLENT)]?: 3
IN GENERAL, WOULD YOU SAY YOUR QUALITY OF LIFE IS...[ON A SCALE OF 1 (POOR) TO 5 (EXCELLENT)]: 4
TO WHAT EXTENT ARE YOU ABLE TO CARRY OUT YOUR EVERYDAY PHYSICAL ACTIVITIES SUCH AS WALKING, CLIMBING STAIRS, CARRYING GROCERIES, OR MOVING A CHAIR [ON A SCALE OF 1 (NOT AT ALL) TO 5 (COMPLETELY)]?: 2
IN THE PAST 7 DAYS, HOW OFTEN HAVE YOU BEEN BOTHERED BY EMOTIONAL PROBLEMS, SUCH AS FEELING ANXIOUS, DEPRESSED, OR IRRITABLE [ON A SCALE FROM 1 (NEVER) TO 5 (ALWAYS)]?: 2
SUM OF RESPONSES TO QUESTIONS 3, 6, 7, & 8: 16
IN GENERAL, HOW WOULD YOU RATE YOUR PHYSICAL HEALTH [ON A SCALE OF 1 (POOR) TO 5 (EXCELLENT)]?: 4
IN GENERAL, HOW WOULD YOU RATE YOUR SATISFACTION WITH YOUR SOCIAL ACTIVITIES AND RELATIONSHIPS [ON A SCALE OF 1 (POOR) TO 5 (EXCELLENT)]?: 5
HOW IS THE PROMIS V1.1 BEING ADMINISTERED?: 0
IN GENERAL, HOW WOULD YOU RATE YOUR MENTAL HEALTH, INCLUDING YOUR MOOD AND YOUR ABILITY TO THINK [ON A SCALE OF 1 (POOR) TO 5 (EXCELLENT)]?: 5
IN GENERAL, WOULD YOU SAY YOUR HEALTH IS...[ON A SCALE OF 1 (POOR) TO 5 (EXCELLENT)]: 3

## 2023-04-06 ASSESSMENT — PAIN DESCRIPTION - DESCRIPTORS: DESCRIPTORS: ACHING

## 2023-04-06 ASSESSMENT — KOOS JR
BENDING TO THE FLOOR TO PICK UP OBJECT: 1
KOOS JR TOTAL INTERVAL SCORE: 54.84
STANDING UPRIGHT: 2
GOING UP OR DOWN STAIRS: 2
STRAIGHTENING KNEE FULLY: 0
RISING FROM SITTING: 3
HOW SEVERE IS YOUR KNEE STIFFNESS AFTER FIRST WAKING IN MORNING: 2
TWISING OR PIVOTING ON KNEE: 3

## 2023-04-06 ASSESSMENT — PAIN DESCRIPTION - LOCATION: LOCATION: KNEE

## 2023-04-06 ASSESSMENT — PAIN SCALES - GENERAL: PAINLEVEL_OUTOF10: 3

## 2023-04-06 ASSESSMENT — PAIN DESCRIPTION - FREQUENCY: FREQUENCY: CONTINUOUS

## 2023-04-06 ASSESSMENT — PAIN DESCRIPTION - ORIENTATION: ORIENTATION: RIGHT

## 2023-04-06 ASSESSMENT — PAIN DESCRIPTION - PAIN TYPE: TYPE: CHRONIC PAIN

## 2023-04-06 NOTE — PROGRESS NOTES
Protein, UA NEGATIVE NEGATIVE    Urobilinogen, Urine Normal Normal    Nitrite, Urine NEGATIVE NEGATIVE    Leukocyte Esterase, Urine NEGATIVE NEGATIVE   Microscopic Urinalysis    Collection Time: 04/06/23  8:46 AM   Result Value Ref Range    WBC, UA 0 TO 2 0 - 5 /HPF    RBC, UA 0 TO 2 0 - 2 /HPF    Epithelial Cells UA 2 TO 5 0 - 5 /HPF       Recent Labs     04/06/23  0831   HGB 12.7   HCT 39.8   WBC 6.2   MCV 94.1      K 3.4*      CO2 29   BUN 13   CREATININE 0.89   GLUCOSE 115*   INR 0.9   PROTIME 11.6   APTT 28.1   AST 17   ALT 14   LABALBU 4.4       No results for input(s): COVID19 in the last 720 hours.     KERRIE Mcledo CNP    Electronically signed 4/6/2023 at 9:09 AM

## 2023-04-06 NOTE — PRE-PROCEDURE INSTRUCTIONS
perfume or deodorant to be used the day of surgery.  No nail polish on the operative extremity (arm/leg surgeries)    If you are staying overnight with us, please bring a small bag of necessary personal items.    Please wear loose, comfortable clothing.  If you are potentially going to have a cast or brace bring clothing that will fit over them.                                                                                                          In case of illness - If you have cold or flu like symptoms (high fever, runny nose, sore throat, cough, etc.) rash, nausea, vomiting, loose stools, and/or recent contact with someone who has a contagious disease (chicken pox, measles, etc.) Please call your doctor before coming to the hospital.         Day of Surgery/Procedure:    As a patient at [facility] you can expect quality medical and nursing care that is centered on your individual needs.  Our goal is to make your surgical experience as comfortable as possible    .  Transportation After Your Surgery/Procedure:    You will need a friend or family member to drive you home after your procedure.  Your  must be 18 years of age or older and able to sign off on your discharge instructions.  A taxi cab or any other form of public transportation is not acceptable.  Your friend or family member must stay at the hospital throughout your procedure.    Someone must remain with you for the first 24 hours after your surgery if you receive anesthesia or medication.  If you do not have someone to stay with you, your procedure may be cancelled.      If you have any other questions regarding your procedure or the day of surgery, please call [phone]      _________________________  ____________________________  Signature (Patient)              Signature (Provider)               Date

## 2023-04-07 ENCOUNTER — ANESTHESIA EVENT (OUTPATIENT)
Dept: OPERATING ROOM | Age: 68
End: 2023-04-07
Payer: MEDICARE

## 2023-04-07 LAB
EKG ATRIAL RATE: 79 BPM
EKG P AXIS: 60 DEGREES
EKG P-R INTERVAL: 188 MS
EKG Q-T INTERVAL: 402 MS
EKG QRS DURATION: 90 MS
EKG QTC CALCULATION (BAZETT): 460 MS
EKG R AXIS: 20 DEGREES
EKG T AXIS: 32 DEGREES
EKG VENTRICULAR RATE: 79 BPM
MICROORGANISM SPEC CULT: NORMAL
MRSA, DNA, NASAL: NEGATIVE
SPECIMEN DESCRIPTION: NORMAL
SPECIMEN DESCRIPTION: NORMAL

## 2023-04-07 NOTE — FLOWSHEET NOTE
04/07/23 1431   Anesthesia PAT Clearance   Anesthesia Review Status Fan has reviewed patient for surgery  (Dr. Lantigua So reviewed labs, history with Dalila OAKES with no further intervention needed)

## 2023-04-25 ENCOUNTER — ANESTHESIA (OUTPATIENT)
Dept: OPERATING ROOM | Age: 68
End: 2023-04-25
Payer: MEDICARE

## 2023-04-25 ENCOUNTER — APPOINTMENT (OUTPATIENT)
Dept: GENERAL RADIOLOGY | Age: 68
End: 2023-04-25
Attending: ORTHOPAEDIC SURGERY
Payer: MEDICARE

## 2023-04-25 ENCOUNTER — HOSPITAL ENCOUNTER (OUTPATIENT)
Age: 68
Discharge: HOME | End: 2023-04-25
Attending: ORTHOPAEDIC SURGERY | Admitting: ORTHOPAEDIC SURGERY
Payer: MEDICARE

## 2023-04-25 VITALS
TEMPERATURE: 97.3 F | HEART RATE: 67 BPM | DIASTOLIC BLOOD PRESSURE: 74 MMHG | SYSTOLIC BLOOD PRESSURE: 145 MMHG | HEIGHT: 65 IN | BODY MASS INDEX: 39.15 KG/M2 | OXYGEN SATURATION: 98 % | WEIGHT: 235 LBS | RESPIRATION RATE: 16 BRPM

## 2023-04-25 PROBLEM — M17.11 PRIMARY OSTEOARTHRITIS OF RIGHT KNEE: Chronic | Status: RESOLVED | Noted: 2023-04-25 | Resolved: 2023-04-25

## 2023-04-25 PROBLEM — M17.11 PRIMARY OSTEOARTHRITIS OF RIGHT KNEE: Chronic | Status: ACTIVE | Noted: 2023-04-25

## 2023-04-25 PROBLEM — Z96.651 S/P TOTAL KNEE ARTHROPLASTY, RIGHT: Status: ACTIVE | Noted: 2023-04-25

## 2023-04-25 PROBLEM — M17.12 PRIMARY OSTEOARTHRITIS OF LEFT KNEE: Status: RESOLVED | Noted: 2022-06-20 | Resolved: 2023-04-25

## 2023-04-25 LAB — GLUCOSE BLD-MCNC: 108 MG/DL (ref 65–105)

## 2023-04-25 PROCEDURE — 2709999900 HC NON-CHARGEABLE SUPPLY: Performed by: ORTHOPAEDIC SURGERY

## 2023-04-25 PROCEDURE — 2580000003 HC RX 258: Performed by: ORTHOPAEDIC SURGERY

## 2023-04-25 PROCEDURE — 3600000005 HC SURGERY LEVEL 5 BASE: Performed by: ORTHOPAEDIC SURGERY

## 2023-04-25 PROCEDURE — 6370000000 HC RX 637 (ALT 250 FOR IP): Performed by: STUDENT IN AN ORGANIZED HEALTH CARE EDUCATION/TRAINING PROGRAM

## 2023-04-25 PROCEDURE — 97110 THERAPEUTIC EXERCISES: CPT

## 2023-04-25 PROCEDURE — 64447 NJX AA&/STRD FEMORAL NRV IMG: CPT | Performed by: ANESTHESIOLOGY

## 2023-04-25 PROCEDURE — 73560 X-RAY EXAM OF KNEE 1 OR 2: CPT

## 2023-04-25 PROCEDURE — 2500000003 HC RX 250 WO HCPCS: Performed by: ANESTHESIOLOGY

## 2023-04-25 PROCEDURE — C1713 ANCHOR/SCREW BN/BN,TIS/BN: HCPCS | Performed by: ORTHOPAEDIC SURGERY

## 2023-04-25 PROCEDURE — 7100000001 HC PACU RECOVERY - ADDTL 15 MIN: Performed by: ORTHOPAEDIC SURGERY

## 2023-04-25 PROCEDURE — C1776 JOINT DEVICE (IMPLANTABLE): HCPCS | Performed by: ORTHOPAEDIC SURGERY

## 2023-04-25 PROCEDURE — 3700000001 HC ADD 15 MINUTES (ANESTHESIA): Performed by: ORTHOPAEDIC SURGERY

## 2023-04-25 PROCEDURE — 7100000000 HC PACU RECOVERY - FIRST 15 MIN: Performed by: ORTHOPAEDIC SURGERY

## 2023-04-25 PROCEDURE — 6370000000 HC RX 637 (ALT 250 FOR IP): Performed by: ANESTHESIOLOGY

## 2023-04-25 PROCEDURE — A4217 STERILE WATER/SALINE, 500 ML: HCPCS | Performed by: ORTHOPAEDIC SURGERY

## 2023-04-25 PROCEDURE — 2580000003 HC RX 258: Performed by: ANESTHESIOLOGY

## 2023-04-25 PROCEDURE — 6360000002 HC RX W HCPCS: Performed by: ORTHOPAEDIC SURGERY

## 2023-04-25 PROCEDURE — 6360000002 HC RX W HCPCS: Performed by: NURSE ANESTHETIST, CERTIFIED REGISTERED

## 2023-04-25 PROCEDURE — 97535 SELF CARE MNGMENT TRAINING: CPT

## 2023-04-25 PROCEDURE — 6360000002 HC RX W HCPCS: Performed by: ANESTHESIOLOGY

## 2023-04-25 PROCEDURE — 82947 ASSAY GLUCOSE BLOOD QUANT: CPT

## 2023-04-25 PROCEDURE — 6370000000 HC RX 637 (ALT 250 FOR IP): Performed by: ORTHOPAEDIC SURGERY

## 2023-04-25 PROCEDURE — 2500000003 HC RX 250 WO HCPCS: Performed by: NURSE ANESTHETIST, CERTIFIED REGISTERED

## 2023-04-25 PROCEDURE — 97162 PT EVAL MOD COMPLEX 30 MIN: CPT

## 2023-04-25 PROCEDURE — 3700000000 HC ANESTHESIA ATTENDED CARE: Performed by: ORTHOPAEDIC SURGERY

## 2023-04-25 PROCEDURE — 97530 THERAPEUTIC ACTIVITIES: CPT

## 2023-04-25 PROCEDURE — 97166 OT EVAL MOD COMPLEX 45 MIN: CPT

## 2023-04-25 PROCEDURE — 3600000015 HC SURGERY LEVEL 5 ADDTL 15MIN: Performed by: ORTHOPAEDIC SURGERY

## 2023-04-25 PROCEDURE — 2500000003 HC RX 250 WO HCPCS: Performed by: ORTHOPAEDIC SURGERY

## 2023-04-25 PROCEDURE — 2720000010 HC SURG SUPPLY STERILE: Performed by: ORTHOPAEDIC SURGERY

## 2023-04-25 DEVICE — IMPLANT PAT 35X7MM CR IPOLY ITOTAL: Type: IMPLANTABLE DEVICE | Site: KNEE | Status: FUNCTIONAL

## 2023-04-25 DEVICE — CEMENT BNE 40GM FULL DOSE PMMA W/O ANTIBIO HI VISC N RADPQ: Type: IMPLANTABLE DEVICE | Site: KNEE | Status: FUNCTIONAL

## 2023-04-25 RX ORDER — GABAPENTIN 300 MG/1
300 CAPSULE ORAL ONCE
Status: COMPLETED | OUTPATIENT
Start: 2023-04-25 | End: 2023-04-25

## 2023-04-25 RX ORDER — MIDAZOLAM HYDROCHLORIDE 1 MG/ML
2 INJECTION INTRAMUSCULAR; INTRAVENOUS ONCE
Status: COMPLETED | OUTPATIENT
Start: 2023-04-25 | End: 2023-04-25

## 2023-04-25 RX ORDER — LIDOCAINE HYDROCHLORIDE 20 MG/ML
INJECTION, SOLUTION EPIDURAL; INFILTRATION; INTRACAUDAL; PERINEURAL PRN
Status: DISCONTINUED | OUTPATIENT
Start: 2023-04-25 | End: 2023-04-25 | Stop reason: SDUPTHER

## 2023-04-25 RX ORDER — MORPHINE SULFATE 2 MG/ML
2 INJECTION, SOLUTION INTRAMUSCULAR; INTRAVENOUS
Status: DISCONTINUED | OUTPATIENT
Start: 2023-04-25 | End: 2023-04-25 | Stop reason: HOSPADM

## 2023-04-25 RX ORDER — METFORMIN HYDROCHLORIDE 500 MG/1
TABLET, EXTENDED RELEASE ORAL
COMMUNITY
Start: 2023-04-11

## 2023-04-25 RX ORDER — TRANEXAMIC ACID 100 MG/ML
INJECTION, SOLUTION INTRAVENOUS PRN
Status: DISCONTINUED | OUTPATIENT
Start: 2023-04-25 | End: 2023-04-25 | Stop reason: SDUPTHER

## 2023-04-25 RX ORDER — OXYCODONE HYDROCHLORIDE 5 MG/1
5 TABLET ORAL
Status: COMPLETED | OUTPATIENT
Start: 2023-04-25 | End: 2023-04-25

## 2023-04-25 RX ORDER — SODIUM CHLORIDE 9 MG/ML
INJECTION, SOLUTION INTRAVENOUS PRN
Status: DISCONTINUED | OUTPATIENT
Start: 2023-04-25 | End: 2023-04-25 | Stop reason: HOSPADM

## 2023-04-25 RX ORDER — ONDANSETRON 4 MG/1
4 TABLET, ORALLY DISINTEGRATING ORAL EVERY 8 HOURS PRN
Status: DISCONTINUED | OUTPATIENT
Start: 2023-04-25 | End: 2023-04-25 | Stop reason: HOSPADM

## 2023-04-25 RX ORDER — FENTANYL CITRATE 50 UG/ML
INJECTION, SOLUTION INTRAMUSCULAR; INTRAVENOUS PRN
Status: DISCONTINUED | OUTPATIENT
Start: 2023-04-25 | End: 2023-04-25 | Stop reason: SDUPTHER

## 2023-04-25 RX ORDER — ROCURONIUM BROMIDE 10 MG/ML
INJECTION, SOLUTION INTRAVENOUS PRN
Status: DISCONTINUED | OUTPATIENT
Start: 2023-04-25 | End: 2023-04-25 | Stop reason: SDUPTHER

## 2023-04-25 RX ORDER — BUPIVACAINE HYDROCHLORIDE 5 MG/ML
INJECTION, SOLUTION EPIDURAL; INTRACAUDAL
Status: COMPLETED | OUTPATIENT
Start: 2023-04-25 | End: 2023-04-25

## 2023-04-25 RX ORDER — PROPOFOL 10 MG/ML
INJECTION, EMULSION INTRAVENOUS CONTINUOUS PRN
Status: DISCONTINUED | OUTPATIENT
Start: 2023-04-25 | End: 2023-04-25 | Stop reason: SDUPTHER

## 2023-04-25 RX ORDER — OXYCODONE HYDROCHLORIDE 5 MG/1
5 TABLET ORAL EVERY 4 HOURS PRN
Status: DISCONTINUED | OUTPATIENT
Start: 2023-04-25 | End: 2023-04-25 | Stop reason: HOSPADM

## 2023-04-25 RX ORDER — ACETAMINOPHEN 325 MG/1
650 TABLET ORAL EVERY 6 HOURS
Status: DISCONTINUED | OUTPATIENT
Start: 2023-04-25 | End: 2023-04-25 | Stop reason: HOSPADM

## 2023-04-25 RX ORDER — CELECOXIB 200 MG/1
200 CAPSULE ORAL ONCE
Status: DISCONTINUED | OUTPATIENT
Start: 2023-04-25 | End: 2023-04-25 | Stop reason: HOSPADM

## 2023-04-25 RX ORDER — SODIUM CHLORIDE, SODIUM LACTATE, POTASSIUM CHLORIDE, CALCIUM CHLORIDE 600; 310; 30; 20 MG/100ML; MG/100ML; MG/100ML; MG/100ML
INJECTION, SOLUTION INTRAVENOUS CONTINUOUS
Status: DISCONTINUED | OUTPATIENT
Start: 2023-04-25 | End: 2023-04-25 | Stop reason: HOSPADM

## 2023-04-25 RX ORDER — ONDANSETRON 2 MG/ML
INJECTION INTRAMUSCULAR; INTRAVENOUS PRN
Status: DISCONTINUED | OUTPATIENT
Start: 2023-04-25 | End: 2023-04-25 | Stop reason: SDUPTHER

## 2023-04-25 RX ORDER — SODIUM CHLORIDE 0.9 % (FLUSH) 0.9 %
5-40 SYRINGE (ML) INJECTION EVERY 12 HOURS SCHEDULED
Status: DISCONTINUED | OUTPATIENT
Start: 2023-04-25 | End: 2023-04-25 | Stop reason: HOSPADM

## 2023-04-25 RX ORDER — ONDANSETRON 2 MG/ML
4 INJECTION INTRAMUSCULAR; INTRAVENOUS EVERY 6 HOURS PRN
Status: DISCONTINUED | OUTPATIENT
Start: 2023-04-25 | End: 2023-04-25 | Stop reason: HOSPADM

## 2023-04-25 RX ORDER — ACETAMINOPHEN 500 MG
1000 TABLET ORAL ONCE
Status: COMPLETED | OUTPATIENT
Start: 2023-04-25 | End: 2023-04-25

## 2023-04-25 RX ORDER — OXYCODONE HYDROCHLORIDE 5 MG/1
10 TABLET ORAL EVERY 4 HOURS PRN
Status: DISCONTINUED | OUTPATIENT
Start: 2023-04-25 | End: 2023-04-25 | Stop reason: HOSPADM

## 2023-04-25 RX ORDER — FENTANYL CITRATE 50 UG/ML
50 INJECTION, SOLUTION INTRAMUSCULAR; INTRAVENOUS EVERY 5 MIN PRN
Status: DISCONTINUED | OUTPATIENT
Start: 2023-04-25 | End: 2023-04-25 | Stop reason: HOSPADM

## 2023-04-25 RX ORDER — MORPHINE SULFATE 4 MG/ML
4 INJECTION, SOLUTION INTRAMUSCULAR; INTRAVENOUS
Status: DISCONTINUED | OUTPATIENT
Start: 2023-04-25 | End: 2023-04-25 | Stop reason: HOSPADM

## 2023-04-25 RX ORDER — PROPOFOL 10 MG/ML
INJECTION, EMULSION INTRAVENOUS PRN
Status: DISCONTINUED | OUTPATIENT
Start: 2023-04-25 | End: 2023-04-25 | Stop reason: SDUPTHER

## 2023-04-25 RX ORDER — DIPHENHYDRAMINE HYDROCHLORIDE 50 MG/ML
12.5 INJECTION INTRAMUSCULAR; INTRAVENOUS
Status: DISCONTINUED | OUTPATIENT
Start: 2023-04-25 | End: 2023-04-25 | Stop reason: HOSPADM

## 2023-04-25 RX ORDER — PROMETHAZINE HYDROCHLORIDE 12.5 MG/1
12.5 TABLET ORAL ONCE
Status: COMPLETED | OUTPATIENT
Start: 2023-04-25 | End: 2023-04-25

## 2023-04-25 RX ORDER — FENTANYL CITRATE 50 UG/ML
25 INJECTION, SOLUTION INTRAMUSCULAR; INTRAVENOUS EVERY 5 MIN PRN
Status: DISCONTINUED | OUTPATIENT
Start: 2023-04-25 | End: 2023-04-25 | Stop reason: HOSPADM

## 2023-04-25 RX ORDER — HYDROMORPHONE HCL 110MG/55ML
PATIENT CONTROLLED ANALGESIA SYRINGE INTRAVENOUS PRN
Status: DISCONTINUED | OUTPATIENT
Start: 2023-04-25 | End: 2023-04-25 | Stop reason: SDUPTHER

## 2023-04-25 RX ORDER — CLINDAMYCIN PHOSPHATE 900 MG/50ML
900 INJECTION INTRAVENOUS ONCE
Status: COMPLETED | OUTPATIENT
Start: 2023-04-25 | End: 2023-04-25

## 2023-04-25 RX ORDER — OXYCODONE HYDROCHLORIDE 5 MG/1
5 TABLET ORAL EVERY 4 HOURS PRN
Status: DISCONTINUED | OUTPATIENT
Start: 2023-04-25 | End: 2023-04-25

## 2023-04-25 RX ORDER — SODIUM CHLORIDE 0.9 % (FLUSH) 0.9 %
5-40 SYRINGE (ML) INJECTION PRN
Status: DISCONTINUED | OUTPATIENT
Start: 2023-04-25 | End: 2023-04-25 | Stop reason: HOSPADM

## 2023-04-25 RX ORDER — ONDANSETRON 2 MG/ML
4 INJECTION INTRAMUSCULAR; INTRAVENOUS
Status: DISCONTINUED | OUTPATIENT
Start: 2023-04-25 | End: 2023-04-25 | Stop reason: HOSPADM

## 2023-04-25 RX ORDER — OXYCODONE HYDROCHLORIDE 5 MG/1
10 TABLET ORAL EVERY 4 HOURS PRN
Status: DISCONTINUED | OUTPATIENT
Start: 2023-04-25 | End: 2023-04-25

## 2023-04-25 RX ORDER — DEXAMETHASONE SODIUM PHOSPHATE 10 MG/ML
INJECTION, SOLUTION INTRAMUSCULAR; INTRAVENOUS PRN
Status: DISCONTINUED | OUTPATIENT
Start: 2023-04-25 | End: 2023-04-25 | Stop reason: SDUPTHER

## 2023-04-25 RX ADMIN — ACETAMINOPHEN 1000 MG: 500 TABLET ORAL at 07:19

## 2023-04-25 RX ADMIN — FENTANYL CITRATE 50 MCG: 50 INJECTION, SOLUTION INTRAMUSCULAR; INTRAVENOUS at 11:00

## 2023-04-25 RX ADMIN — CLINDAMYCIN PHOSPHATE 900 MG: 900 INJECTION, SOLUTION INTRAVENOUS at 07:45

## 2023-04-25 RX ADMIN — PROPOFOL 150 MG: 10 INJECTION, EMULSION INTRAVENOUS at 07:34

## 2023-04-25 RX ADMIN — LIDOCAINE HYDROCHLORIDE 100 MG: 20 INJECTION, SOLUTION EPIDURAL; INFILTRATION; INTRACAUDAL; PERINEURAL at 07:34

## 2023-04-25 RX ADMIN — SODIUM CHLORIDE, PRESERVATIVE FREE 10 ML: 5 INJECTION INTRAVENOUS at 12:09

## 2023-04-25 RX ADMIN — PROMETHAZINE HYDROCHLORIDE 12.5 MG: 12.5 TABLET ORAL at 07:27

## 2023-04-25 RX ADMIN — SODIUM CHLORIDE, POTASSIUM CHLORIDE, SODIUM LACTATE AND CALCIUM CHLORIDE: 600; 310; 30; 20 INJECTION, SOLUTION INTRAVENOUS at 12:11

## 2023-04-25 RX ADMIN — MIDAZOLAM 2 MG: 1 INJECTION INTRAMUSCULAR; INTRAVENOUS at 07:12

## 2023-04-25 RX ADMIN — OXYCODONE HYDROCHLORIDE 5 MG: 5 TABLET ORAL at 10:45

## 2023-04-25 RX ADMIN — ONDANSETRON 4 MG: 2 INJECTION INTRAMUSCULAR; INTRAVENOUS at 07:40

## 2023-04-25 RX ADMIN — BUPIVACAINE HYDROCHLORIDE 20 ML: 5 INJECTION, SOLUTION EPIDURAL; INTRACAUDAL; PERINEURAL at 07:10

## 2023-04-25 RX ADMIN — SODIUM CHLORIDE, POTASSIUM CHLORIDE, SODIUM LACTATE AND CALCIUM CHLORIDE: 600; 310; 30; 20 INJECTION, SOLUTION INTRAVENOUS at 09:24

## 2023-04-25 RX ADMIN — MORPHINE SULFATE 4 MG: 4 INJECTION, SOLUTION INTRAMUSCULAR; INTRAVENOUS at 12:17

## 2023-04-25 RX ADMIN — TRANEXAMIC ACID 1000 MG: 100 INJECTION, SOLUTION INTRAVENOUS at 07:56

## 2023-04-25 RX ADMIN — HYDROMORPHONE HYDROCHLORIDE 0.4 MG: 2 INJECTION, SOLUTION INTRAMUSCULAR; INTRAVENOUS; SUBCUTANEOUS at 09:10

## 2023-04-25 RX ADMIN — FENTANYL CITRATE 100 MCG: 50 INJECTION INTRAMUSCULAR; INTRAVENOUS at 07:34

## 2023-04-25 RX ADMIN — SODIUM CHLORIDE, POTASSIUM CHLORIDE, SODIUM LACTATE AND CALCIUM CHLORIDE: 600; 310; 30; 20 INJECTION, SOLUTION INTRAVENOUS at 06:44

## 2023-04-25 RX ADMIN — HYDROMORPHONE HYDROCHLORIDE 0.4 MG: 2 INJECTION, SOLUTION INTRAMUSCULAR; INTRAVENOUS; SUBCUTANEOUS at 08:57

## 2023-04-25 RX ADMIN — GABAPENTIN 300 MG: 300 CAPSULE ORAL at 07:19

## 2023-04-25 RX ADMIN — PROPOFOL 30 MCG/KG/MIN: 10 INJECTION, EMULSION INTRAVENOUS at 07:42

## 2023-04-25 RX ADMIN — TRANEXAMIC ACID 1000 MG: 100 INJECTION, SOLUTION INTRAVENOUS at 09:34

## 2023-04-25 RX ADMIN — OXYCODONE HYDROCHLORIDE 10 MG: 5 TABLET ORAL at 14:03

## 2023-04-25 RX ADMIN — ROCURONIUM BROMIDE 50 MG: 10 INJECTION, SOLUTION INTRAVENOUS at 07:34

## 2023-04-25 RX ADMIN — SUGAMMADEX 200 MG: 100 INJECTION, SOLUTION INTRAVENOUS at 09:42

## 2023-04-25 RX ADMIN — DEXAMETHASONE SODIUM PHOSPHATE 10 MG: 10 INJECTION, SOLUTION INTRAMUSCULAR; INTRAVENOUS at 07:40

## 2023-04-25 RX ADMIN — ACETAMINOPHEN 650 MG: 325 TABLET ORAL at 12:15

## 2023-04-25 ASSESSMENT — PAIN DESCRIPTION - LOCATION
LOCATION: KNEE

## 2023-04-25 ASSESSMENT — PAIN DESCRIPTION - DESCRIPTORS
DESCRIPTORS: ACHING
DESCRIPTORS: ACHING
DESCRIPTORS: SHARP
DESCRIPTORS: ACHING;DISCOMFORT
DESCRIPTORS: ACHING;POUNDING
DESCRIPTORS: ACHING

## 2023-04-25 ASSESSMENT — PAIN DESCRIPTION - PAIN TYPE
TYPE: SURGICAL PAIN

## 2023-04-25 ASSESSMENT — PAIN DESCRIPTION - ONSET
ONSET: ON-GOING

## 2023-04-25 ASSESSMENT — PAIN DESCRIPTION - FREQUENCY
FREQUENCY: CONTINUOUS

## 2023-04-25 ASSESSMENT — PAIN - FUNCTIONAL ASSESSMENT
PAIN_FUNCTIONAL_ASSESSMENT: PREVENTS OR INTERFERES SOME ACTIVE ACTIVITIES AND ADLS
PAIN_FUNCTIONAL_ASSESSMENT: PREVENTS OR INTERFERES SOME ACTIVE ACTIVITIES AND ADLS
PAIN_FUNCTIONAL_ASSESSMENT: 0-10
PAIN_FUNCTIONAL_ASSESSMENT: PREVENTS OR INTERFERES SOME ACTIVE ACTIVITIES AND ADLS

## 2023-04-25 ASSESSMENT — PAIN DESCRIPTION - ORIENTATION
ORIENTATION: RIGHT

## 2023-04-25 ASSESSMENT — PAIN SCALES - GENERAL
PAINLEVEL_OUTOF10: 6
PAINLEVEL_OUTOF10: 5
PAINLEVEL_OUTOF10: 5
PAINLEVEL_OUTOF10: 9
PAINLEVEL_OUTOF10: 8

## 2023-04-25 ASSESSMENT — ENCOUNTER SYMPTOMS: SHORTNESS OF BREATH: 0

## 2023-04-25 NOTE — PROGRESS NOTES
Pt admitted to room #2109 from PACU  Oriented to room and call light/tv controls. Bed in lowest position, wheels locked, 2/4 side rails up  Call light in reach, room free of clutter, adequate lighting provided.

## 2023-04-25 NOTE — OP NOTE
Operative Note      Patient: Alison Fernandez  YOB: 1955  MRN: [id number]    Date of Procedure: 4/25/2023    Pre-Op Diagnosis Codes:     * Primary osteoarthritis of right knee [M17.11]    Post-Op Diagnosis: Same       Procedure(s):  RIGHT KNEE TOTAL ARTHROPLASTY - [facility]    Surgeon(s):  Shonda Mosley MD    Assistant:   Surgical Assistant: Madelyn Posadas  Surgical Assistant: Mahogany Diallo    Anesthesia: General    Estimated Blood Loss (mL): 802     Complications: None    Specimens:   * No specimens in log *    Implants:  Implant Name Type Inv. Item Serial No.  Lot No. LRB No. Used Action   CEMENT BNE 40GM FULL DOSE PMMA W/O ANTIBIO HI VISC N RADPQ - [id number]  CEMENT BNE 40GM FULL DOSE PMMA W/O ANTIBIO HI VISC N RADPQ  [employer]-WD [id number] Right 2 Implanted   IMPLANT PAT 35X7MM CR IPOLY ITOTAL - [id number]  IMPLANT PAT 35X7MM CR IPOLY ITOTAL  [employer]-WD [id number] Right 1 Implanted   CONFORMIS IDENTITY IMPRINT PS JIGS   [id number] CONFORMIS INC_COV  Right 1 Implanted         Drains: * No LDAs found *    Findings: Severe OA right knee, moderate patellofemoral focus      Detailed Description of Procedure: Indications  This patient has had severe pain and arthritis of the right knee, unresponsive to conservative treatment. It is now recommended that the patient have total knee replacement. After discussion of the particulars of surgery, risks and benefits as well as alternative treatments, I believe that all of their questions were answered to their satisfaction. Informed consent is now given to proceed with surgery as discussed. Procedure  After proper patient identification and marking of the surgical site in the preoperative area, and following update of the history and physical examination, the patient was brought to the operative suite and placed on the table in supine position. The patient was identified as Alison Fernandez. An adequate anesthetic was induced.  The

## 2023-04-25 NOTE — PROGRESS NOTES
Physical Therapy  Facility/Department: [facility] SURG  Physical Therapy Initial Assessment    Name: Brady Bennett  : 1955  MRN: [id number]  Date of Service: 2023    Discharge Recommendations:  Patient would benefit from continued therapy after discharge          Patient Diagnosis(es): There were no encounter diagnoses. Past Medical History:  has a past medical history of Arthritis, CAD (coronary artery disease), CHF (congestive heart failure) ([facility] [address] CPAP (continuous positive airway pressure) dependence, DJD (degenerative joint disease), Fibromyalgia, GERD (gastroesophageal reflux disease), Hx of blood clots, Hyperlipidemia, Hypertension, Mitral valve regurgitation, Renal insufficiency, Seasonal allergies, Sleep apnea, Spinal stenosis, and Thyroid disease. Past Surgical History:  has a past surgical history that includes Hysterectomy; Ankle surgery (Left); lymphadenectomy (Right); Pain management procedure; Breast biopsy (Right); Knee arthroscopy (Left); Dental surgery; Total knee arthroplasty (Left, 2022); and Total knee arthroplasty (Right, 2023). Assessment   Body Structures, Functions, Activity Limitations Requiring Skilled Therapeutic Intervention: Decreased functional mobility ; Decreased strength;Decreased ROM    Assessment: Pt presents same day s/p TKA w/ excellent tolerance to being vertical; Rt LE localized knee numbness w/ initial buckling with first session. Pt 2nd session with signfiicant improvement and increased stability. Pt fiercly independent and let go of the walker with started falling as her right leg didn't support her; patient pulled back onto her non-surgical leg and was able to prevent fall with mod assist x 1. Re-educated patient re: importance of using walker and keeping UE's on walker for safety. Pt states that she will slow down. Pt Will continue to progress while hospitalized.     Specific Instructions for Next Treatment: gait; stairs; exercises  Therapy Prognosis:

## 2023-04-25 NOTE — ANESTHESIA PRE PROCEDURE
Department of Anesthesiology  Preprocedure Note       Name:  Ze Elliott   Age:  76 y.o.  :  1955                                          MRN:  [id number]         Date:  2023      Surgeon: Monica Sevilla):  Andrei Simmons MD    Procedure: Procedure(s):  RIGHT KNEE TOTAL ARTHROPLASTY - [facility]    Medications prior to admission:   Prior to Admission medications    Medication Sig Start Date End Date Taking? Authorizing Provider   metFORMIN (GLUCOPHAGE-XR) 500 MG extended release tablet  23   Historical Provider, MD   vitamin C (ASCORBIC ACID) 500 MG tablet Take 2 tablets by mouth daily    Historical Provider, MD   XARELTO 15 MG TABS tablet Take 1 tablet by mouth 2 times daily (with meals) for 18 days And then take Xarelto 20 mg once a day 22  Gloria Valente MD   rivaroxaban (XARELTO) 20 MG TABS tablet Take 1 tablet by mouth daily (with breakfast) 22  Gloria Valente MD   Elastic Bandages & Supports (JOBST KNEE HIGH COMPRESSION SM) MISC 1 each by Does not apply route daily as needed (dvt) 22   Donovan Cartwright MD   vitamin D3 (CHOLECALCIFEROL) 10 MCG (400 UNIT) TABS tablet Take 1 tablet by mouth daily    Historical Provider, MD   Probiotic Product (PROBIOTIC ACIDOPHILUS BEADS) CAPS Take by mouth  Patient not taking: Reported on 2023    Historical Provider, MD   gabapentin (NEURONTIN) 300 MG capsule Take 300 mg by mouth 3 times daily. Historical Provider, MD   amitriptyline (ELAVIL) 50 MG tablet Take 1 tablet by mouth nightly    Historical Provider, MD   tiZANidine (ZANAFLEX) 4 MG tablet Take 1 tablet by mouth in the morning and 1 tablet in the evening.     Historical Provider, MD   coenzyme Q10 200 MG CAPS capsule Take 1 capsule by mouth daily 20   Historical Provider, MD   dilTIAZem [facility]) 240 MG extended release capsule Take 1 capsule by mouth daily 21   Historical Provider, MD   furosemide (LASIX) 20 MG tablet Take 1 tablet by mouth three times a week

## 2023-04-25 NOTE — PHYSICIAN ADVISORY
Ortho Face-to-Face Discussion of Medical Necessity for Use of Assistive Device after Joint Replacement Surgery    This patient was evaluated today and a DME order was entered for a wheeled walker because the patient requires this to successfully complete daily living tasks of ambulating. A wheeled walker is necessary due to the patient's unsteady gait, upper body weakness, and inability to  an ambulation device; and they can ambulate only by pushing a walker instead of a lesser assistive device such as a cane, crutch, or standard walker. The need for this equipment was discussed with the patient and they understand and are in agreement.

## 2023-04-25 NOTE — ANESTHESIA PROCEDURE NOTES
Peripheral Block    Patient location during procedure: pre-op  Reason for block: post-op pain management and at surgeon's request  Start time: 4/25/2023 7:10 AM  End time: 4/25/2023 7:15 AM  Staffing  Performed: anesthesiologist   Anesthesiologist: Diya Ponce MD  Preanesthetic Checklist  Completed: patient identified, IV checked, site marked, risks and benefits discussed, surgical/procedural consents, equipment checked, pre-op evaluation, timeout performed, anesthesia consent given, oxygen available, monitors applied/VS acknowledged, fire risk safety assessment completed and verbalized and blood product R/B/A discussed and consented  Peripheral Block   Patient position: supine  Prep: ChloraPrep  Provider prep: mask and sterile gloves  Patient monitoring: cardiac monitor, continuous pulse ox, frequent blood pressure checks and IV access  Block type: Saphenous  Laterality: N/A  Injection technique: single-shot  Guidance: ultrasound guided    Needle   Needle type: insulated echogenic nerve stimulator needle   Needle localization: ultrasound guidance  Assessment   Injection assessment: negative aspiration for heme, no paresthesia on injection, local visualized surrounding nerve on ultrasound and no intravascular symptoms  Slow fractionated injection: yes  Hemodynamics: stable  Real-time US image taken/store: yes  Outcomes: uncomplicated    Additional Notes  U/S [id number]  Medications Administered  bupivacaine (MARCAINE) PF injection 0.5% - Perineural   20 mL - 4/25/2023 7:10:00 AM

## 2023-04-25 NOTE — PHYSICIAN ADVISORY
The patient was counseled at length about the risks of fabiana Covid-19 during their perioperative period and any recovery window from their procedure. The patient was made aware that fabiana Covid-19  may worsen their prognosis for recovering from their procedure  and lend to a higher morbidity and/or mortality risk. All material risks, benefits, and reasonable alternatives including postponing the procedure were discussed. The patient does wish to proceed with the procedure at this time. [facility]

## 2023-04-25 NOTE — PROGRESS NOTES
Occupational Therapy  Facility/Department: [facility] MED SURG  Occupational Therapy Initial Assessment    Name: Liliana Rodriguez  : 1955  MRN: [id number]  Date of Service: 2023    RN Larue Koyanagi reports patient is medically stable for therapy treatment this date. Chart reviewed prior to treatment and patient is agreeable for therapy. All lines intact and patient positioned comfortably at end of treatment. All patient needs addressed prior to ending therapy session. Discharge Recommendations:   (Home w/ assist)  OT Equipment Recommendations  Equipment Needed: Yes  Mobility Devices: ADL Assistive Devices  ADL Assistive Devices: Long-handled Sponge;Walker Basket       Pt is s/p R TKA on 2023 - Dr. Drake Singh. Patient Diagnosis(es): There were no encounter diagnoses. Past Medical History:  has a past medical history of Arthritis, CAD (coronary artery disease), CHF (congestive heart failure) ([facility] [address] CPAP (continuous positive airway pressure) dependence, DJD (degenerative joint disease), Fibromyalgia, GERD (gastroesophageal reflux disease), Hx of blood clots, Hyperlipidemia, Hypertension, Mitral valve regurgitation, Renal insufficiency, Seasonal allergies, Sleep apnea, Spinal stenosis, and Thyroid disease. Past Surgical History:  has a past surgical history that includes Hysterectomy; Ankle surgery (Left); lymphadenectomy (Right); Pain management procedure; Breast biopsy (Right); Knee arthroscopy (Left); Dental surgery; Total knee arthroplasty (Left, 2022); and Total knee arthroplasty (Right, 2023). Assessment   Performance deficits / Impairments: Decreased functional mobility ; Decreased safe awareness;Decreased balance;Decreased ADL status; Decreased endurance;Decreased ROM; Decreased high-level IADLs  Assessment: Pt presents with deficits in functional mobility and ADL status s/p R TKA.  Skilled OT services are indicated at this time to maximize this pt's safety and IND with self care

## 2023-04-25 NOTE — BRIEF OP NOTE
Brief Postoperative Note      Patient: Alison Fernandez  YOB: 1955  MRN: [id number]    Date of Procedure: 4/25/2023    Pre-Op Diagnosis Codes:     * Primary osteoarthritis of right knee [M17.11]    Post-Op Diagnosis: Same       Procedure(s):  RIGHT KNEE TOTAL ARTHROPLASTY - [facility]    Surgeon:  Shonda Mosley MD    Assistant:  Surgical Assistant: Madelyn Posadas  Surgical Assistant: Mahogany Diallo    Anesthesia: General    Estimated Blood Loss (mL): 129     Complications: None    Specimens:   * No specimens in log *    Implants:  Implant Name Type Inv.  Item Serial No.  Lot No. LRB No. Used Action   CEMENT BNE 40GM FULL DOSE PMMA W/O ANTIBIO HI VISC N RADPQ - [id number]  CEMENT BNE 40GM FULL DOSE PMMA W/O ANTIBIO HI VISC N RADPQ  [employer]- [id number] Right 2 Implanted   IMPLANT PAT 35X7MM CR IPOLY ITOTAL - [id number]  IMPLANT PAT 35X7MM CR IPOLY ITOTAL  [employer]-WD [id number] Right 1 Implanted   CONFORMIS IDENTITY IMPRINT PS JIGS   [id number] [employer] INC_COV  Right 1 Implanted         Drains: * No LDAs found *    Findings: Severe OA right knee, patellofemoral focus      Electronically signed by Shonda Mosley MD on 4/25/2023 at 10:24 AM

## 2023-04-25 NOTE — PROGRESS NOTES
Orthopedic Coordinator Note    Patient s/p right total Knee replacement on 04/25/2023 WITH DR. Whit Suarez. The following appointments are currently scheduled:    Post-op with surgeon 05/11/2023 AT 1030. Physical Therapy OPPT RX WILL BE IN CHART FOR PT      Wheeled walker order is not entered  Face to face documentation is N/A-PT HAS A FWW FROM 06/20/2022 LTKA SHE WILL BRING. DVT Prophylaxis: 2.5MG ELIQUIS BID X 21 DAYS PT WAS GIVEN RX PRE-OP AND WAS TO GET FILLED. PT HAS PERCOCET ESCRIBED AT [facility] (formerly [facility]). PT WAS GIVEN A PAPER RX FOR GABAPENTIN TO FILL PRE-OP AS WELL AS [place] REQUIRES A PAPER RX.        Any questions please contact Kavitha Kennedy RN, MSN  [phone]      Electronically signed by: Kavitha Kennedy RN on 4/25/2023 at 9:02 AM

## 2023-04-25 NOTE — DISCHARGE INSTRUCTIONS
blood.  The skin around your dressing looks more red or irritated than usual even after you have rested and elevated your leg. The incisional wound has any odor/smell. The dressing is coming loose. The alarm display will not stop flashing. Showering with the ADITYA dressing  The dressing on top of the surgical incision site is water resistant. You can shower and wash with the dressing in place, as long as you take care not to expose the dressing directly to jets of water and do not soak it. Soaking the dressing will cause it to fall off. You MAY shower - as long as you disconnect the pump from the dressing first.  Press the orange button to pause the therapy. Unscrew the two connector parts. The ADITYA pump should not be exposed to jets of water. Put the pump on a counter outside the shower area and away from the source of water. Make sure the tubing that is still attached to the dressing is facing downward, away from the water source, so that water cannot enter the tube. After you have dried off and blotted the dressing site and area around the dressing, reconnect the tubing to the pump. Turn the pump back on. PLEASE NOTE:The batteries for the ADITYA dressing will stop negative pressure after 7 days, no need to replace th batteries. Dr. Kayley Andrews wants the patient to cut the flat tubing about 1\" from the patient's dressing on the hop or knee and place a piece of tape over the cut end attached to the dressing still on the patient. The battery pack and tubing can all be thrown in the trash as they are not recyclable.

## 2023-04-25 NOTE — H&P
Interval H&P Note    Pt Name: Alejandrina oRque  MRN: [de-identified]  YOB: 1955  Date of evaluation: 4/25/2023      [x] I have reviewed in [facility] the family medicine progress note by Dr. Elvis Mcmillan dated 04/03/2023, attached below for an Interval History and Physical note. [x] I have examined  Alejandrina Roque  There are no changes to the patient who is scheduled for RIGHT KNEE TOTAL ARTHROPLASTY - [facility] by Chris Eckert MD for Primary osteoarthritis of right knee. The patient denies new health changes, fever, chills, wheezing, cough, increased SOB, chest pain, open sores or wounds. Hx of diabetes, POC . Denies any current blood thinning medications. Patient developed DVT following left knee replacement in June 2022. She was on Xarelto for three months for this and has been off of it since the fall. She states there are plans to start her on Eliquis following her surgery today. Vital signs: BP (!) 140/57   Pulse 72   Temp 96.8 °F (36 °C) (Temporal)   Resp 18   Ht 5' 5\" (1.651 m)   Wt 235 lb (106.6 kg)   SpO2 97%   BMI 39.11 kg/m²     Allergies:  Amoxicillin    Medications:    Prior to Admission medications    Medication Sig Start Date End Date Taking?  Authorizing Provider   metFORMIN (GLUCOPHAGE-XR) 500 MG extended release tablet  4/11/23   Historical Provider, MD   vitamin C (ASCORBIC ACID) 500 MG tablet Take 2 tablets by mouth daily    Historical Provider, MD   XARELTO 15 MG TABS tablet Take 1 tablet by mouth 2 times daily (with meals) for 18 days And then take Xarelto 20 mg once a day 7/2/22 7/20/22  Gloria Valente MD   rivaroxaban (XARELTO) 20 MG TABS tablet Take 1 tablet by mouth daily (with breakfast) 7/20/22 8/19/22  Gloria Valente MD   Elastic Bandages & Supports (JOBST KNEE HIGH COMPRESSION SM) MISC 1 each by Does not apply route daily as needed (dvt) 7/2/22   Maria C Charlton MD   vitamin D3 (CHOLECALCIFEROL) 10 MCG (400 UNIT) TABS tablet Take 1 tablet by mouth daily    Historical

## 2023-04-25 NOTE — ANESTHESIA POSTPROCEDURE EVALUATION
Department of Anesthesiology  Postprocedure Note    Patient: Boris Cummins  MRN: [de-identified]  YOB: 1955  Date of evaluation: 4/25/2023      Procedure Summary     Date: 04/25/23 Room / Location: [address] [place] [address] - INPATIENT    Anesthesia Start: 0730 Anesthesia Stop: 1006    Procedure: RIGHT KNEE TOTAL ARTHROPLASTY - Alysha Banrett (Right: Knee) Diagnosis:       Primary osteoarthritis of right knee      (Primary osteoarthritis of right knee [M17.11])    Surgeons: Merary Muñoz MD Responsible Provider: Jer Ramachandran MD    Anesthesia Type: General ASA Status: 3          Anesthesia Type: General    Jay Phase I: Jay Score: 9    Jay Phase II:        Anesthesia Post Evaluation    Complications: no

## 2023-09-29 ENCOUNTER — HOSPITAL ENCOUNTER (EMERGENCY)
Age: 68
Discharge: HOME | End: 2023-09-29
Attending: EMERGENCY MEDICINE
Payer: MEDICARE

## 2023-09-29 ENCOUNTER — APPOINTMENT (OUTPATIENT)
Dept: CT IMAGING | Age: 68
End: 2023-09-29
Payer: MEDICARE

## 2023-09-29 ENCOUNTER — APPOINTMENT (OUTPATIENT)
Dept: GENERAL RADIOLOGY | Age: 68
End: 2023-09-29
Payer: MEDICARE

## 2023-09-29 VITALS
DIASTOLIC BLOOD PRESSURE: 66 MMHG | SYSTOLIC BLOOD PRESSURE: 154 MMHG | OXYGEN SATURATION: 97 % | HEART RATE: 94 BPM | RESPIRATION RATE: 16 BRPM | WEIGHT: 232 LBS | BODY MASS INDEX: 38.61 KG/M2 | TEMPERATURE: 98.4 F

## 2023-09-29 DIAGNOSIS — E87.6 HYPOKALEMIA: Primary | ICD-10-CM

## 2023-09-29 LAB
ANION GAP SERPL CALCULATED.3IONS-SCNC: 13 MMOL/L (ref 9–17)
BASOPHILS # BLD: 0.09 K/UL (ref 0–0.2)
BASOPHILS NFR BLD: 1 % (ref 0–2)
BNP SERPL-MCNC: <36 PG/ML
BUN SERPL-MCNC: 9 MG/DL (ref 8–23)
BUN/CREAT SERPL: 10 (ref 9–20)
CALCIUM SERPL-MCNC: 9.7 MG/DL (ref 8.6–10.4)
CHLORIDE SERPL-SCNC: 101 MMOL/L (ref 98–107)
CO2 SERPL-SCNC: 25 MMOL/L (ref 20–31)
CREAT SERPL-MCNC: 0.9 MG/DL (ref 0.5–0.9)
EKG ATRIAL RATE: 103 BPM
EKG P AXIS: 54 DEGREES
EKG P-R INTERVAL: 170 MS
EKG Q-T INTERVAL: 346 MS
EKG QRS DURATION: 80 MS
EKG QTC CALCULATION (BAZETT): 453 MS
EKG R AXIS: 1 DEGREES
EKG T AXIS: 55 DEGREES
EKG VENTRICULAR RATE: 103 BPM
EOSINOPHIL # BLD: 0.14 K/UL (ref 0–0.44)
EOSINOPHILS RELATIVE PERCENT: 2 % (ref 1–4)
ERYTHROCYTE [DISTWIDTH] IN BLOOD BY AUTOMATED COUNT: 14.9 % (ref 11.8–14.4)
GFR SERPL CREATININE-BSD FRML MDRD: >60 ML/MIN/1.73M2
GLUCOSE SERPL-MCNC: 141 MG/DL (ref 70–99)
HCT VFR BLD AUTO: 39.8 % (ref 36.3–47.1)
HGB BLD-MCNC: 12.6 G/DL (ref 11.9–15.1)
IMM GRANULOCYTES # BLD AUTO: 0.02 K/UL (ref 0–0.3)
IMM GRANULOCYTES NFR BLD: 0 %
LYMPHOCYTES NFR BLD: 1.19 K/UL (ref 1.1–3.7)
LYMPHOCYTES RELATIVE PERCENT: 14 % (ref 24–43)
MAGNESIUM SERPL-MCNC: 1.9 MG/DL (ref 1.6–2.6)
MCH RBC QN AUTO: 28.3 PG (ref 25.2–33.5)
MCHC RBC AUTO-ENTMCNC: 31.7 G/DL (ref 28.4–34.8)
MCV RBC AUTO: 89.4 FL (ref 82.6–102.9)
MONOCYTES NFR BLD: 0.73 K/UL (ref 0.1–1.2)
MONOCYTES NFR BLD: 9 % (ref 3–12)
NEUTROPHILS NFR BLD: 74 % (ref 36–65)
NEUTS SEG NFR BLD: 6.43 K/UL (ref 1.5–8.1)
NRBC BLD-RTO: 0 PER 100 WBC
PLATELET # BLD AUTO: 274 K/UL (ref 138–453)
PMV BLD AUTO: 9.8 FL (ref 8.1–13.5)
POTASSIUM SERPL-SCNC: 2.9 MMOL/L (ref 3.7–5.3)
RBC # BLD AUTO: 4.45 M/UL (ref 3.95–5.11)
RBC # BLD: ABNORMAL 10*6/UL
SODIUM SERPL-SCNC: 139 MMOL/L (ref 135–144)
T4 FREE SERPL-MCNC: 1.3 NG/DL (ref 0.9–1.7)
TROPONIN I SERPL HS-MCNC: 11 NG/L (ref 0–14)
TSH SERPL DL<=0.05 MIU/L-ACNC: 3.12 UIU/ML (ref 0.3–5)
WBC OTHER # BLD: 8.6 K/UL (ref 3.5–11.3)

## 2023-09-29 PROCEDURE — 84484 ASSAY OF TROPONIN QUANT: CPT

## 2023-09-29 PROCEDURE — 6360000004 HC RX CONTRAST MEDICATION: Performed by: EMERGENCY MEDICINE

## 2023-09-29 PROCEDURE — 71260 CT THORAX DX C+: CPT

## 2023-09-29 PROCEDURE — 83735 ASSAY OF MAGNESIUM: CPT

## 2023-09-29 PROCEDURE — 84439 ASSAY OF FREE THYROXINE: CPT

## 2023-09-29 PROCEDURE — 2580000003 HC RX 258: Performed by: EMERGENCY MEDICINE

## 2023-09-29 PROCEDURE — 84443 ASSAY THYROID STIM HORMONE: CPT

## 2023-09-29 PROCEDURE — 83880 ASSAY OF NATRIURETIC PEPTIDE: CPT

## 2023-09-29 PROCEDURE — 85025 COMPLETE CBC W/AUTO DIFF WBC: CPT

## 2023-09-29 PROCEDURE — 80048 BASIC METABOLIC PNL TOTAL CA: CPT

## 2023-09-29 PROCEDURE — 99285 EMERGENCY DEPT VISIT HI MDM: CPT

## 2023-09-29 PROCEDURE — 93005 ELECTROCARDIOGRAM TRACING: CPT | Performed by: EMERGENCY MEDICINE

## 2023-09-29 PROCEDURE — 6370000000 HC RX 637 (ALT 250 FOR IP): Performed by: EMERGENCY MEDICINE

## 2023-09-29 PROCEDURE — 71045 X-RAY EXAM CHEST 1 VIEW: CPT

## 2023-09-29 RX ORDER — POTASSIUM CHLORIDE 20 MEQ/1
20 TABLET, EXTENDED RELEASE ORAL DAILY
Qty: 30 TABLET | Refills: 0 | Status: SHIPPED | OUTPATIENT
Start: 2023-09-29

## 2023-09-29 RX ORDER — LEVOTHYROXINE SODIUM 0.1 MG/1
100 TABLET ORAL
COMMUNITY

## 2023-09-29 RX ORDER — 0.9 % SODIUM CHLORIDE 0.9 %
80 INTRAVENOUS SOLUTION INTRAVENOUS ONCE
Status: COMPLETED | OUTPATIENT
Start: 2023-09-29 | End: 2023-09-29

## 2023-09-29 RX ORDER — SODIUM CHLORIDE 0.9 % (FLUSH) 0.9 %
10 SYRINGE (ML) INJECTION PRN
Status: DISCONTINUED | OUTPATIENT
Start: 2023-09-29 | End: 2023-09-29 | Stop reason: HOSPADM

## 2023-09-29 RX ORDER — AMITRIPTYLINE HYDROCHLORIDE 75 MG/1
75 TABLET ORAL NIGHTLY
COMMUNITY

## 2023-09-29 RX ORDER — GABAPENTIN 100 MG/1
100 CAPSULE ORAL 3 TIMES DAILY
COMMUNITY

## 2023-09-29 RX ADMIN — SODIUM CHLORIDE 80 ML: 9 INJECTION, SOLUTION INTRAVENOUS at 12:16

## 2023-09-29 RX ADMIN — POTASSIUM BICARBONATE 40 MEQ: 782 TABLET, EFFERVESCENT ORAL at 13:56

## 2023-09-29 RX ADMIN — IOPAMIDOL 75 ML: 755 INJECTION, SOLUTION INTRAVENOUS at 12:15

## 2023-09-29 RX ADMIN — SODIUM CHLORIDE, PRESERVATIVE FREE 10 ML: 5 INJECTION INTRAVENOUS at 12:16

## 2023-09-29 ASSESSMENT — ENCOUNTER SYMPTOMS
EYE DISCHARGE: 0
BACK PAIN: 0
ABDOMINAL DISTENTION: 0
ABDOMINAL PAIN: 0
FACIAL SWELLING: 0
SHORTNESS OF BREATH: 1
CHEST TIGHTNESS: 0
EYE PAIN: 0

## 2023-09-29 ASSESSMENT — LIFESTYLE VARIABLES
HOW OFTEN DO YOU HAVE A DRINK CONTAINING ALCOHOL: NEVER
HOW MANY STANDARD DRINKS CONTAINING ALCOHOL DO YOU HAVE ON A TYPICAL DAY: PATIENT DOES NOT DRINK

## 2023-09-29 ASSESSMENT — PAIN - FUNCTIONAL ASSESSMENT: PAIN_FUNCTIONAL_ASSESSMENT: NONE - DENIES PAIN

## 2023-09-29 NOTE — ED PROVIDER NOTES
EMERGENCY DEPARTMENT ENCOUNTER    Pt Name: Sanjuanita Beal  MRN: [de-identified]  [id number] 1955  Date of evaluation: 9/29/23  CHIEF COMPLAINT       Chief Complaint   Patient presents with    Tachycardia     Seen cardio yesterday, schedule for echo next week     HISTORY OF PRESENT ILLNESS   HPI   Patient is a 63-year-old female with a history of coronary artery disease CHF and hypertension who presented to the emergency department secondary to tachycardia. Patient said for the last several days has had intermittent episodes of tachycardia. States has also had increased shortness of breath. She was seen by cardiologist Dr. Michaell Snellen increased Lasix. She is scheduled for an echo next week. She has a previous history of blood clots in her left leg was previously on a blood thinner but not currently on a blood thinner. She denied a previous history of CHF or COPD she is not on home oxygen. She took a COVID test yesterday that was negative. She denies hemoptysis. She denies leg pain or swelling. Patient drinks diet Pepsi daily has not had increased use of caffeinated beverages or supplements. She denied a previous history of thyroid disorder. Patient denies chest pain, nausea, vomiting, fevers or chills. REVIEW OF SYSTEMS     Review of Systems   Constitutional:  Negative for chills, diaphoresis and fever. HENT:  Negative for congestion, ear pain and facial swelling. Eyes:  Negative for pain, discharge and visual disturbance. Respiratory:  Positive for shortness of breath. Negative for chest tightness. Cardiovascular:  Positive for palpitations. Negative for chest pain. Gastrointestinal:  Negative for abdominal distention and abdominal pain. Genitourinary:  Negative for difficulty urinating and flank pain. Musculoskeletal:  Negative for back pain. Skin:  Negative for wound. Neurological:  Negative for dizziness, light-headedness and headaches.      PASTMEDICAL HISTORY     Past Medical History: department. Disposition discussion with patient/family:  yes    Case discussed with consulting clinician:  Dr. Ivana Lay    MIPS:  NA    Social determinants of health impacting treatment or disposition:  none    Shared Decision Making: The patient was involved in his/her plan of care through shared decision making. The testing that was ordered was discussed with the patient. Any medications that may have been ordered were discussed with the patient    Code Status Discussion:  full code    \"ED Course\" Notes From Epic Narrator:         CRITICAL CARE:       PROCEDURES:    Procedures      DATA FOR LAB AND RADIOLOGY TESTS ORDERED BELOW ARE REVIEWED BY THE ED CLINICIAN:    RADIOLOGY: All x-rays, CT, MRI, and formal ultrasound images (except ED bedside ultrasound) are read by the radiologist, see reports below, unless otherwise noted in MDM or here. Reports below are reviewed by myself. CT CHEST PULMONARY EMBOLISM W CONTRAST   Final Result   1. No clear evidence for central pulmonary embolus within the limitations of   this study. Evaluation of the lobar pulmonary arterial vasculature and   beyond is severely limited. Segmental and subsegmental pulmonary emboli are   difficult to exclude on the basis of this study. 2. Mosaic attenuation throughout both lungs which can be seen with air   trapping, small vessel occlusion, or small airways disease. 3. Mild-to-moderate emphysema. 4. Fatty liver. 5. Atherosclerotic calcification and atheromatous plaque of the aorta. XR CHEST PORTABLE   Final Result   No acute process. LABS: Lab orders shown below, the results are reviewed by myself, and all abnormals are listed below.   Labs Reviewed   BASIC METABOLIC PANEL - Abnormal; Notable for the following components:       Result Value    Potassium 2.9 (*)     Glucose 141 (*)     All other components within normal limits   CBC WITH AUTO DIFFERENTIAL - Abnormal; Notable for the following components:    RDW

## 2025-01-20 RX ORDER — DILTIAZEM HYDROCHLORIDE 360 MG/1
360 CAPSULE, EXTENDED RELEASE ORAL
COMMUNITY

## 2025-01-20 NOTE — PRE-PROCEDURE INSTRUCTIONS
ARRIVE AT THE HOSPITAL ON Wednesday 1/22/2025 arrive at 06:30am (phone pat)    Once you enter the hospital lobby, take the elevators to the second floor.  Check-In is at the surgery registration desk.      Continue to take your home medications as you normally do up to and including the night before surgery with the exception of any blood thinning medications.      Blood Thinning Medications:  Please stop prescription blood thinning medications such as Apixaban (Eliquis); Clopidogrel (Plavix); Dabigatran (Pradaxa); Prasugrel (Effient); Rivaroxaban (Xarelto); Ticagrelor (Brilinta); Warfarin (Coumadin) only as directed by your surgeon and/or the prescribing physician    Some common examples of other medications that can thin your blood are: Aspirin, Ibuprofen (Advil, Motrin), Naproxen (Aleve), Meloxicam (Mobic), Celecoxib (Celebrex), Fish Oil, many Herbal Supplements.  These medications should usually be stopped at least 7 days prior to surgery.        Stop using OTC pain relievers containing Aspirin, Ibuprofen (Advil, Motrin) or Naproxen (Aleve) seven days prior to surgery.  It is OK to continue using Tylenol for pain the week prior to surgery.    Failure to stop certain medications may interfere with your scheduled surgery.    If you receive instructions from your surgeon regarding what medications to stop prior to surgery, please follow those specific instructions.      If You Have Diabetes:  Do not take any of your diabetic medications, (injectables or by mouth) the morning of surgery unless otherwise instructed by the doctor who manages your diabetes. If you are taking insulin, contact the doctor the manages your diabetes for instructions about any changes to your insulin dosages the day before surgery.        Please take the following medication(s) the day of surgery with a small sip of water:  Gabapentin,levothyroxine,cytomel,omeprazole, and diltizem    Please use your inhaler(s) if needed and bring your

## 2025-01-22 ENCOUNTER — HOSPITAL ENCOUNTER (OUTPATIENT)
Age: 70
Setting detail: OUTPATIENT SURGERY
Discharge: HOME | End: 2025-01-22
Attending: PLASTIC SURGERY | Admitting: PLASTIC SURGERY
Payer: MEDICARE

## 2025-01-22 ENCOUNTER — ANESTHESIA (OUTPATIENT)
Dept: OPERATING ROOM | Age: 70
End: 2025-01-22
Payer: MEDICARE

## 2025-01-22 ENCOUNTER — ANESTHESIA EVENT (OUTPATIENT)
Dept: OPERATING ROOM | Age: 70
End: 2025-01-22
Payer: MEDICARE

## 2025-01-22 VITALS
RESPIRATION RATE: 17 BRPM | DIASTOLIC BLOOD PRESSURE: 50 MMHG | WEIGHT: 235 LBS | HEIGHT: 66 IN | OXYGEN SATURATION: 95 % | TEMPERATURE: 98.2 F | BODY MASS INDEX: 37.77 KG/M2 | SYSTOLIC BLOOD PRESSURE: 125 MMHG | HEART RATE: 64 BPM

## 2025-01-22 DIAGNOSIS — N65.1 DEFORMITY AND DISPROPORTION OF RECONSTRUCTED BREAST: ICD-10-CM

## 2025-01-22 DIAGNOSIS — N65.0 DEFORMITY OF RECONSTRUCTED BREAST: ICD-10-CM

## 2025-01-22 DIAGNOSIS — N65.0 DEFORMITY AND DISPROPORTION OF RECONSTRUCTED BREAST: ICD-10-CM

## 2025-01-22 DIAGNOSIS — Z90.10 ACQUIRED ABSENCE OF BREAST AND ABSENT NIPPLE, UNSPECIFIED LATERALITY: ICD-10-CM

## 2025-01-22 DIAGNOSIS — Z85.3 HX: BREAST CANCER: ICD-10-CM

## 2025-01-22 LAB
GLUCOSE BLD-MCNC: 112 MG/DL (ref 65–105)
GLUCOSE BLD-MCNC: 94 MG/DL (ref 65–105)

## 2025-01-22 PROCEDURE — 7100000001 HC PACU RECOVERY - ADDTL 15 MIN: Performed by: PLASTIC SURGERY

## 2025-01-22 PROCEDURE — 7100000000 HC PACU RECOVERY - FIRST 15 MIN: Performed by: PLASTIC SURGERY

## 2025-01-22 PROCEDURE — 2580000003 HC RX 258: Performed by: PLASTIC SURGERY

## 2025-01-22 PROCEDURE — 6360000002 HC RX W HCPCS: Performed by: PLASTIC SURGERY

## 2025-01-22 PROCEDURE — 3600000002 HC SURGERY LEVEL 2 BASE: Performed by: PLASTIC SURGERY

## 2025-01-22 PROCEDURE — 6360000002 HC RX W HCPCS: Performed by: PHYSICIAN ASSISTANT

## 2025-01-22 PROCEDURE — 3600000012 HC SURGERY LEVEL 2 ADDTL 15MIN: Performed by: PLASTIC SURGERY

## 2025-01-22 PROCEDURE — 7100000011 HC PHASE II RECOVERY - ADDTL 15 MIN: Performed by: PLASTIC SURGERY

## 2025-01-22 PROCEDURE — 6360000002 HC RX W HCPCS: Performed by: ANESTHESIOLOGY

## 2025-01-22 PROCEDURE — 3700000001 HC ADD 15 MINUTES (ANESTHESIA): Performed by: PLASTIC SURGERY

## 2025-01-22 PROCEDURE — 2709999900 HC NON-CHARGEABLE SUPPLY: Performed by: PLASTIC SURGERY

## 2025-01-22 PROCEDURE — 6370000000 HC RX 637 (ALT 250 FOR IP): Performed by: ANESTHESIOLOGY

## 2025-01-22 PROCEDURE — 6360000002 HC RX W HCPCS: Performed by: SPECIALIST

## 2025-01-22 PROCEDURE — 2580000003 HC RX 258: Performed by: SPECIALIST

## 2025-01-22 PROCEDURE — 82947 ASSAY GLUCOSE BLOOD QUANT: CPT

## 2025-01-22 PROCEDURE — 6370000000 HC RX 637 (ALT 250 FOR IP): Performed by: PLASTIC SURGERY

## 2025-01-22 PROCEDURE — 2580000003 HC RX 258: Performed by: ANESTHESIOLOGY

## 2025-01-22 PROCEDURE — 2500000003 HC RX 250 WO HCPCS: Performed by: PLASTIC SURGERY

## 2025-01-22 PROCEDURE — 3700000000 HC ANESTHESIA ATTENDED CARE: Performed by: PLASTIC SURGERY

## 2025-01-22 PROCEDURE — 88305 TISSUE EXAM BY PATHOLOGIST: CPT

## 2025-01-22 PROCEDURE — 7100000010 HC PHASE II RECOVERY - FIRST 15 MIN: Performed by: PLASTIC SURGERY

## 2025-01-22 PROCEDURE — 2500000003 HC RX 250 WO HCPCS: Performed by: SPECIALIST

## 2025-01-22 RX ORDER — SODIUM CHLORIDE, SODIUM LACTATE, POTASSIUM CHLORIDE, CALCIUM CHLORIDE 600; 310; 30; 20 MG/100ML; MG/100ML; MG/100ML; MG/100ML
INJECTION, SOLUTION INTRAVENOUS
Status: DISCONTINUED | OUTPATIENT
Start: 2025-01-22 | End: 2025-01-22 | Stop reason: SDUPTHER

## 2025-01-22 RX ORDER — SODIUM CHLORIDE 9 MG/ML
INJECTION, SOLUTION INTRAVENOUS PRN
Status: DISCONTINUED | OUTPATIENT
Start: 2025-01-22 | End: 2025-01-22 | Stop reason: HOSPADM

## 2025-01-22 RX ORDER — SODIUM CHLORIDE 0.9 % (FLUSH) 0.9 %
5-40 SYRINGE (ML) INJECTION EVERY 12 HOURS SCHEDULED
Status: DISCONTINUED | OUTPATIENT
Start: 2025-01-22 | End: 2025-01-22 | Stop reason: HOSPADM

## 2025-01-22 RX ORDER — ROCURONIUM BROMIDE 10 MG/ML
INJECTION, SOLUTION INTRAVENOUS
Status: DISCONTINUED | OUTPATIENT
Start: 2025-01-22 | End: 2025-01-22 | Stop reason: SDUPTHER

## 2025-01-22 RX ORDER — SODIUM CHLORIDE 0.9 % (FLUSH) 0.9 %
5-40 SYRINGE (ML) INJECTION PRN
Status: DISCONTINUED | OUTPATIENT
Start: 2025-01-22 | End: 2025-01-22 | Stop reason: HOSPADM

## 2025-01-22 RX ORDER — SODIUM CHLORIDE 9 MG/ML
INJECTION, SOLUTION INTRAVENOUS CONTINUOUS
Status: DISCONTINUED | OUTPATIENT
Start: 2025-01-22 | End: 2025-01-22 | Stop reason: HOSPADM

## 2025-01-22 RX ORDER — NEOMYCIN/BACITRACIN/POLYMYXINB 3.5-400-5K
OINTMENT (GRAM) TOPICAL PRN
Status: DISCONTINUED | OUTPATIENT
Start: 2025-01-22 | End: 2025-01-22 | Stop reason: ALTCHOICE

## 2025-01-22 RX ORDER — VANCOMYCIN 1.5 G/300ML
1500 INJECTION, SOLUTION INTRAVENOUS
Status: COMPLETED | OUTPATIENT
Start: 2025-01-22 | End: 2025-01-22

## 2025-01-22 RX ORDER — APREPITANT 40 MG/1
40 CAPSULE ORAL ONCE
Status: COMPLETED | OUTPATIENT
Start: 2025-01-22 | End: 2025-01-22

## 2025-01-22 RX ORDER — METOCLOPRAMIDE HYDROCHLORIDE 5 MG/ML
10 INJECTION INTRAMUSCULAR; INTRAVENOUS
Status: DISCONTINUED | OUTPATIENT
Start: 2025-01-22 | End: 2025-01-22 | Stop reason: HOSPADM

## 2025-01-22 RX ORDER — FENTANYL CITRATE 50 UG/ML
25 INJECTION, SOLUTION INTRAMUSCULAR; INTRAVENOUS EVERY 5 MIN PRN
Status: DISCONTINUED | OUTPATIENT
Start: 2025-01-22 | End: 2025-01-22 | Stop reason: HOSPADM

## 2025-01-22 RX ORDER — DEXAMETHASONE SODIUM PHOSPHATE 10 MG/ML
INJECTION, SOLUTION INTRAMUSCULAR; INTRAVENOUS
Status: DISCONTINUED | OUTPATIENT
Start: 2025-01-22 | End: 2025-01-22 | Stop reason: SDUPTHER

## 2025-01-22 RX ORDER — PHENYLEPHRINE HCL IN 0.9% NACL 1 MG/10 ML
SYRINGE (ML) INTRAVENOUS
Status: DISCONTINUED | OUTPATIENT
Start: 2025-01-22 | End: 2025-01-22 | Stop reason: SDUPTHER

## 2025-01-22 RX ORDER — PROPOFOL 10 MG/ML
INJECTION, EMULSION INTRAVENOUS
Status: DISCONTINUED | OUTPATIENT
Start: 2025-01-22 | End: 2025-01-22 | Stop reason: SDUPTHER

## 2025-01-22 RX ORDER — OXYCODONE AND ACETAMINOPHEN 5; 325 MG/1; MG/1
2 TABLET ORAL ONCE
Status: COMPLETED | OUTPATIENT
Start: 2025-01-22 | End: 2025-01-22

## 2025-01-22 RX ORDER — DIPHENHYDRAMINE HYDROCHLORIDE 50 MG/ML
12.5 INJECTION INTRAMUSCULAR; INTRAVENOUS
Status: DISCONTINUED | OUTPATIENT
Start: 2025-01-22 | End: 2025-01-22 | Stop reason: HOSPADM

## 2025-01-22 RX ORDER — OXYCODONE HYDROCHLORIDE 5 MG/1
5 TABLET ORAL
Status: DISCONTINUED | OUTPATIENT
Start: 2025-01-22 | End: 2025-01-22 | Stop reason: HOSPADM

## 2025-01-22 RX ORDER — OXYCODONE AND ACETAMINOPHEN 5; 325 MG/1; MG/1
1 TABLET ORAL ONCE
Status: COMPLETED | OUTPATIENT
Start: 2025-01-22 | End: 2025-01-22

## 2025-01-22 RX ORDER — FENTANYL CITRATE 50 UG/ML
INJECTION, SOLUTION INTRAMUSCULAR; INTRAVENOUS
Status: DISCONTINUED | OUTPATIENT
Start: 2025-01-22 | End: 2025-01-22 | Stop reason: SDUPTHER

## 2025-01-22 RX ORDER — ONDANSETRON 2 MG/ML
INJECTION INTRAMUSCULAR; INTRAVENOUS
Status: DISCONTINUED | OUTPATIENT
Start: 2025-01-22 | End: 2025-01-22 | Stop reason: SDUPTHER

## 2025-01-22 RX ORDER — HYDROMORPHONE HYDROCHLORIDE 1 MG/ML
0.5 INJECTION, SOLUTION INTRAMUSCULAR; INTRAVENOUS; SUBCUTANEOUS EVERY 5 MIN PRN
Status: DISCONTINUED | OUTPATIENT
Start: 2025-01-22 | End: 2025-01-22 | Stop reason: HOSPADM

## 2025-01-22 RX ORDER — PROCHLORPERAZINE EDISYLATE 5 MG/ML
10 INJECTION INTRAMUSCULAR; INTRAVENOUS
Status: DISCONTINUED | OUTPATIENT
Start: 2025-01-22 | End: 2025-01-22 | Stop reason: HOSPADM

## 2025-01-22 RX ORDER — LIDOCAINE HYDROCHLORIDE 10 MG/ML
1 INJECTION, SOLUTION EPIDURAL; INFILTRATION; INTRACAUDAL; PERINEURAL
Status: DISCONTINUED | OUTPATIENT
Start: 2025-01-22 | End: 2025-01-22 | Stop reason: HOSPADM

## 2025-01-22 RX ORDER — LIDOCAINE HYDROCHLORIDE 20 MG/ML
INJECTION, SOLUTION EPIDURAL; INFILTRATION; INTRACAUDAL; PERINEURAL
Status: DISCONTINUED | OUTPATIENT
Start: 2025-01-22 | End: 2025-01-22 | Stop reason: SDUPTHER

## 2025-01-22 RX ORDER — KETOROLAC TROMETHAMINE 30 MG/ML
30 INJECTION, SOLUTION INTRAMUSCULAR; INTRAVENOUS ONCE
Status: COMPLETED | OUTPATIENT
Start: 2025-01-22 | End: 2025-01-22

## 2025-01-22 RX ORDER — MEPERIDINE HYDROCHLORIDE 50 MG/ML
12.5 INJECTION INTRAMUSCULAR; INTRAVENOUS; SUBCUTANEOUS EVERY 5 MIN PRN
Status: DISCONTINUED | OUTPATIENT
Start: 2025-01-22 | End: 2025-01-22 | Stop reason: HOSPADM

## 2025-01-22 RX ORDER — NALOXONE HYDROCHLORIDE 0.4 MG/ML
INJECTION, SOLUTION INTRAMUSCULAR; INTRAVENOUS; SUBCUTANEOUS PRN
Status: DISCONTINUED | OUTPATIENT
Start: 2025-01-22 | End: 2025-01-22 | Stop reason: HOSPADM

## 2025-01-22 RX ORDER — SODIUM CHLORIDE, SODIUM LACTATE, POTASSIUM CHLORIDE, CALCIUM CHLORIDE 600; 310; 30; 20 MG/100ML; MG/100ML; MG/100ML; MG/100ML
INJECTION, SOLUTION INTRAVENOUS CONTINUOUS
Status: DISCONTINUED | OUTPATIENT
Start: 2025-01-22 | End: 2025-01-22 | Stop reason: HOSPADM

## 2025-01-22 RX ADMIN — PROPOFOL 200 MG: 10 INJECTION, EMULSION INTRAVENOUS at 08:29

## 2025-01-22 RX ADMIN — DEXAMETHASONE SODIUM PHOSPHATE 10 MG: 10 INJECTION, SOLUTION INTRAMUSCULAR; INTRAVENOUS at 08:38

## 2025-01-22 RX ADMIN — ONDANSETRON 4 MG: 2 INJECTION, SOLUTION INTRAMUSCULAR; INTRAVENOUS at 09:32

## 2025-01-22 RX ADMIN — Medication 100 MCG: at 09:23

## 2025-01-22 RX ADMIN — APREPITANT 40 MG: 40 CAPSULE ORAL at 07:46

## 2025-01-22 RX ADMIN — HYDROMORPHONE HYDROCHLORIDE 0.5 MG: 1 INJECTION, SOLUTION INTRAMUSCULAR; INTRAVENOUS; SUBCUTANEOUS at 10:50

## 2025-01-22 RX ADMIN — LIDOCAINE HYDROCHLORIDE 60 MG: 20 INJECTION, SOLUTION EPIDURAL; INFILTRATION; INTRACAUDAL; PERINEURAL at 08:29

## 2025-01-22 RX ADMIN — FENTANYL CITRATE 100 MCG: 50 INJECTION INTRAMUSCULAR; INTRAVENOUS at 08:30

## 2025-01-22 RX ADMIN — HYDROMORPHONE HYDROCHLORIDE 0.5 MG: 1 INJECTION, SOLUTION INTRAMUSCULAR; INTRAVENOUS; SUBCUTANEOUS at 10:39

## 2025-01-22 RX ADMIN — SODIUM CHLORIDE, POTASSIUM CHLORIDE, SODIUM LACTATE AND CALCIUM CHLORIDE: 600; 310; 30; 20 INJECTION, SOLUTION INTRAVENOUS at 06:58

## 2025-01-22 RX ADMIN — OXYCODONE HYDROCHLORIDE AND ACETAMINOPHEN 1 TABLET: 5; 325 TABLET ORAL at 07:46

## 2025-01-22 RX ADMIN — SUGAMMADEX 200 MG: 100 INJECTION, SOLUTION INTRAVENOUS at 09:51

## 2025-01-22 RX ADMIN — SODIUM CHLORIDE, POTASSIUM CHLORIDE, SODIUM LACTATE AND CALCIUM CHLORIDE: 600; 310; 30; 20 INJECTION, SOLUTION INTRAVENOUS at 06:59

## 2025-01-22 RX ADMIN — ROCURONIUM BROMIDE 50 MG: 10 INJECTION, SOLUTION INTRAVENOUS at 08:30

## 2025-01-22 RX ADMIN — Medication 100 MCG: at 09:09

## 2025-01-22 RX ADMIN — OXYCODONE AND ACETAMINOPHEN 2 TABLET: 5; 325 TABLET ORAL at 11:25

## 2025-01-22 RX ADMIN — VANCOMYCIN 1500 MG: 1.5 INJECTION, SOLUTION INTRAVENOUS at 07:32

## 2025-01-22 RX ADMIN — KETOROLAC TROMETHAMINE 30 MG: 30 INJECTION, SOLUTION INTRAMUSCULAR at 11:25

## 2025-01-22 ASSESSMENT — PAIN - FUNCTIONAL ASSESSMENT
PAIN_FUNCTIONAL_ASSESSMENT: FACE, LEGS, ACTIVITY, CRY, AND CONSOLABILITY (FLACC)
PAIN_FUNCTIONAL_ASSESSMENT: 0-10

## 2025-01-22 ASSESSMENT — PAIN DESCRIPTION - ORIENTATION
ORIENTATION: RIGHT
ORIENTATION: RIGHT

## 2025-01-22 ASSESSMENT — PAIN DESCRIPTION - DESCRIPTORS
DESCRIPTORS: THROBBING
DESCRIPTORS: DISCOMFORT;ACHING
DESCRIPTORS: STABBING

## 2025-01-22 ASSESSMENT — PAIN DESCRIPTION - LOCATION
LOCATION: BREAST
LOCATION: BREAST

## 2025-01-22 ASSESSMENT — PAIN SCALES - GENERAL
PAINLEVEL_OUTOF10: 8
PAINLEVEL_OUTOF10: 7
PAINLEVEL_OUTOF10: 9
PAINLEVEL_OUTOF10: 7

## 2025-01-22 NOTE — ANESTHESIA PRE PROCEDURE
daily 4/24/13  Yes Cheri Marte MD   rizatriptan (MAXALT) 5 MG tablet Take 2 tablets by mouth once as needed for Migraine 5/29/20  Yes Cheri Marte MD   simvastatin (ZOCOR) 10 MG tablet Take 1 tablet by mouth nightly 5/17/21  Yes Cheri Marte MD   Elastic Bandages & Supports (JOBST KNEE HIGH COMPRESSION SM) MISC 1 each by Does not apply route daily as needed (dvt) 7/2/22   Nehemiah Boland MD   furosemide (LASIX) 20 MG tablet Take 1 tablet by mouth three times a week 12/3/21   Cheri Marte MD   denosumab (PROLIA) 60 MG/ML SOSY SC injection Inject 60 mg into the skin once August & February    Cheri Marte MD       Current medications:    Current Facility-Administered Medications   Medication Dose Route Frequency Provider Last Rate Last Admin   • 0.9 % sodium chloride infusion   IntraVENous Continuous Bushra Pettit PA-C       • sodium chloride flush 0.9 % injection 5-40 mL  5-40 mL IntraVENous 2 times per day Bushra Pettit PA-C       • sodium chloride flush 0.9 % injection 5-40 mL  5-40 mL IntraVENous PRN Bushra Pettit PA-C       • 0.9 % sodium chloride infusion   IntraVENous PRN Bushra Pettit PA-C       • vancomycin (VANCOCIN) 1500 mg in 300 mL IVPB  1,500 mg IntraVENous On Call to OR Bushra Pettit PA-C 200 mL/hr at 01/22/25 0732 1,500 mg at 01/22/25 0732   • lidocaine PF 1 % injection 1 mL  1 mL IntraDERmal Once PRN Kingsley Echavarria MD       • 0.9 % sodium chloride infusion   IntraVENous Continuous Kingsley Echavarria MD       • lactated ringers infusion   IntraVENous Continuous Kingsley Echavarria  mL/hr at 01/22/25 0659 New Bag at 01/22/25 0659   • sodium chloride flush 0.9 % injection 5-40 mL  5-40 mL IntraVENous 2 times per day Kingsley Echavarria MD       • sodium chloride flush 0.9 % injection 5-40 mL  5-40 mL IntraVENous PRN Kingsley Echavarria MD       • 0.9 % sodium chloride infusion   IntraVENous Kingsley Perez MD

## 2025-01-22 NOTE — PROGRESS NOTES
Pt ambulatory to bathroom and experienced episode of dizziness and nausea.  Chair brought to pt to sit and VS rechecked.  Pox dropped to 89-90% while pt leaning forward in chair, as pt became more upright Pox increased to 98-99% on RA.  After pt verbalized relief from s/s she was assisted into bathroom and able to ambulate with minimal assist back to tx room and cart.

## 2025-01-22 NOTE — H&P
History and Physical Update    Pt Name: Pretty Hodge  MRN: [id number]  YOB: 1955  Date of evaluation: 1/22/2025      [x] I have reviewed the progress note found in Epic by Dr. Bryson dated 1/10/2025 used for an Interval History and Physical note.     [x] I have examined Pretty Hodge a 69 y.o., female who is scheduled for a REVISION OF RIGHT RECONSTRUCTED BREAST by Cristin Verduzco MD due to Acquired absence of breast and absent nipple, unspecified laterality [Z90.10], HX: breast cancer [Z85.3], Deformity of reconstructed breast [N65.0], Deformity and disproportion of reconstructed breast [N65.0, N65.1]. The patient denies health changes since her appointment with Dr. Bryson on 1/10/2025. Pt denies fever, chills, productive cough, SOB, chest pain, open sores, rashes, and wounds. Known insulin resistance. Pt's POC blood glucose today is 94 mg/dL. Coenzyme Q10 and vitamin C were last taken more than a week ago.     Pt states she accidentally cut her left had with a knife last night. Pt denies bleeding and purulent drainage from the cut.    Known CAD (Pt denies having cardiac stents), sleep apnea (Pt uses a CPAP), hyperlipidemia, hypertension, mitral valve regurgitation (Pt states she has a heart murmur), and thyroid disease. Pt states she has not been diagnosed with CHF.     ECHO 7/01/2022  CONCLUSIONS     Summary  Left ventricle is normal in size  Global left ventricular systolic function is normal  Estimated ejection fraction is 65 % .  No significant valvular regurgitation or stenosis seen.  No pericardial effusion seen.  Normal aortic root dimension.      History of blood clots in the pt's left leg and lungs after knee surgery in 2022. Pt denies taking blood thinners at this time.    Known palpitations. Pt states her heart rate will randomly stay \"high\" around 115-120 BPM. Metoprolol and Lasix bring her heart rate down. Her baseline heart rate is in the 80s. Pt had an appointment with Dr. Zaman from  total) by mouth in the morning and 1 capsule (300 mg total) before bedtime. Indications: neuropathic pain. Two tablets.        letrozole (FEMARA) 2.5 mg chemo tablet Take 1 tablet  by mouth  nightly        levothyroxine (SYNTHROID, LEVOTHROID) 100 MCG tablet take 1 tablet by mouth every morning 90 tablet 1    lidocaine (LIDODERM) 5 % Place 1 patch on the skin daily.  Patch may remain in place for up to 12 hours in a 24-hour period Remove previous patch before applying new 30 patch 2    liothyronine (CYTOMEL) 5 MCG tablet TAKE 1 TABLET BY MOUTH EVERY OTHER DAY (ALTERNATE WITH TAKING 2 TABLETS) 90 tablet 1    losartan (COZAAR) 100 mg tablet Take 1 tablet (100 mg total) by mouth nightly Indications: high blood pressure.        metFORMIN XR (GLUCOPHAGE XR) 500 mg 24 hr tablet TAKE 1 TABLET BY MOUTH TWICE A DAY WITH A MEAL 180 tablet 3    metoprolol succinate XL (TOPROL XL) 200 mg 24 hr tablet Take 1 tablet (200 mg total) by mouth nightly Indications: inappropriate sinus tachycardia.        omeprazole (PriLOSEC) 40 mg capsule take 1 capsule by mouth daily (Patient taking differently: Take 1 capsule (40 mg total) by mouth every morning before breakfast.) 90 capsule 3    oxyCODONE-acetaminophen (PERCOCET) 5-325 mg per tablet Take 1 tablet by mouth every 8 (eight) hours as needed.        rizatriptan (MAXALT) 10 mg tablet Take 1 tablet (10 mg total) by mouth once as needed for migraine for up to 1 dose.  May repeat in 2 hours if unresolved. Do not exceed 30 mg in 24 hours. 6 tablet 5    simvastatin (ZOCOR) 10 mg tablet take 1 tablet by mouth daily (Patient taking differently: Take 1 tablet (10 mg total) by mouth daily with dinner Indications: high cholesterol.) 90 tablet 1    tiZANidine (ZANAFLEX) 4 mg tablet Take 1 tablet (4 mg total) by mouth nightly Indications: muscle spasm.        zolpidem CR (AMBIEN CR) 6.25 mg CR tablet TAKE 1 TABLET BY MOUTH EVERY NIGHT AT BEDTIME AS NEEDED (Patient taking differently: Take 1 tablet

## 2025-01-22 NOTE — OP NOTE
Operative Note      Patient: Pretty Hodge  YOB: 1955  MRN: [id number]    Date of Procedure: 1/22/2025    Pre-Op Diagnosis Codes:      * Acquired absence of breast and absent nipple, unspecified laterality [Z90.10]     * HX: breast cancer [Z85.3]     * Deformity of reconstructed breast [N65.0]     * Deformity and disproportion of reconstructed breast [N65.0, N65.1]    Post-Op Diagnosis: Same       Procedure(s):  REVISION OF RIGHT RECONSTRUCTED BREAST (CPT 19693-EW)    Surgeon(s):  Cristin Han MD    Assistant:   * No surgical staff found *    Anesthesia: General    Estimated Blood Loss (mL): Minimal    Complications: None    Specimens:   * No specimens in log *    Implants:  * No implants in log *      Drains: * No LDAs found *    Findings:  Infection Present At Time Of Surgery (PATOS) (choose all levels that have infection present):  No infection present  Other Findings: Well healed mastectomy scar with fibrosis laterally. The resected tissues were all sent as specimen.     Detailed Description of Procedure:   This patient has deformity and disproportion of her reconstructed breast following mastectomy and autologous tissue reconstruction.  After a lengthy discussion the patient has elected to proceed with the above procedures.  Potential complications including but not limited to the possibility of hematoma, seroma, infection, delayed wound healing, hypertrophic wound healing, asymmetry, capsular contracture, fat necrosis, skin necrosis, necrosis of the nipple and areolar complex, changes in sensation of the skin and nipple areolar complex, pulmonary embolism, deep venous thrombosis, and the possible need for further revision operations were all discussed in detail before the procedure.  Proper consent was obtained.  The patient was seen in the preoperative holding area and marked in the upright standing position.  The patient was then taken to the operating room where they underwent a smooth  induction of anesthesia.  The patient's chest was prepped in the usual fashion and drapes were applied sterilely.  Patient received antibiotics intravenously before incisions.    I had marked throughout for revision on the right reconstructed breast.  Mainly this reconstructed breast was larger both in terms of volume and base width compared to the left native breast.  I marked her up for excision of skin and soft tissue along the lateral portion of the reconstructed breast and also circumferentially around her circular skin paddle to provide a pexy or tightening of the overall shape and to narrow the base width of the reconstructed breast on the right side.  I used a scalpel to make all the incisions at this time.  Electrocautery was then used to remove the tissue in a full-thickness fashion.  The excised tissue was sent off the field for permanent pathological evaluation.  Hemostasis was obtained with cautery.  I then proceeded to perform lipectomy using a 3 mm cannula to further contour and shape the flap along the upper outer and upper inner quadrants where she had excess volume and excess projection.  This was done after infiltrating local anesthetic with epinephrine.  At the completion of this I was then able to advance all the skin edges from where the excisions had been performed and these were then approximated with several layers observable suture to complete the revision of the right reconstructed breast.  Overall they felt that the symmetry of her right reconstructed breast compared to the left native breast was improved following these procedures.  Sterile dressings were applied.  She was put into her postsurgical garments.    At the completion of the operation the patient was awakened from anesthesia and transferred to the PACU in stable condition.  There are no complications.  I was present for and participated in the entire operation.     Electronically signed by Cristin Han MD on 1/22/2025 at 8:19

## 2025-01-22 NOTE — ANESTHESIA POSTPROCEDURE EVALUATION
Department of Anesthesiology  Postprocedure Note    Patient: Pretty Hodge  MRN: [id number]  YOB: 1955  Date of evaluation: 1/22/2025    Procedure Summary       Date: 01/22/25 Room / Location: [address]    Anesthesia Start: 0820 Anesthesia Stop: 1011    Procedure: REVISION OF RIGHT RECONSTRUCTED BREAST (Right: Breast) Diagnosis:       Acquired absence of breast and absent nipple, unspecified laterality      HX: breast cancer      Deformity of reconstructed breast      Deformity and disproportion of reconstructed breast      (Acquired absence of breast and absent nipple, unspecified laterality [Z90.10])      (HX: breast cancer [Z85.3])      (Deformity of reconstructed breast [N65.0])      (Deformity and disproportion of reconstructed breast [N65.0, N65.1])    Surgeons: Cristin Han MD Responsible Provider: Dawood Bolaños DO    Anesthesia Type: general ASA Status: 3            Anesthesia Type: No value filed.    Jay Phase I: Jay Score: 9    Jay Phase II: Jay Score: 10    Anesthesia Post Evaluation    Patient location during evaluation: PACU  Patient participation: complete - patient participated  Level of consciousness: awake and alert  Airway patency: patent  Nausea & Vomiting: no nausea and no vomiting  Cardiovascular status: hemodynamically stable  Respiratory status: acceptable  Hydration status: stable  Pain management: adequate    No notable events documented.

## 2025-01-23 LAB — SURGICAL PATHOLOGY REPORT: NORMAL

## 2025-01-23 NOTE — PLAN OF CARE
Problem: Discharge Planning  Goal: Discharge to home or other facility with appropriate resources  6/21/2022 0524 by Marc Rudd RN  Outcome: Progressing     Problem: Chronic Conditions and Co-morbidities  Goal: Patient's chronic conditions and co-morbidity symptoms are monitored and maintained or improved  6/21/2022 0524 by Marc Rudd RN  Outcome: Progressing     Problem: Pain  Goal: Verbalizes/displays adequate comfort level or baseline comfort level  6/21/2022 0524 by Marc Rudd RN  Outcome: Progressing     Problem: Safety - Adult  Goal: Free from fall injury  6/21/2022 0524 by Marc Rudd RN  Outcome: Progressing  Flowsheets (Taken 6/20/2022 2013)  Free From Fall Injury: Instruct family/caregiver on patient safety
Problem: Discharge Planning  Goal: Discharge to home or other facility with appropriate resources  Outcome: Progressing  Flowsheets  Taken 6/20/2022 1222 by Homer Aviles RN  Discharge to home or other facility with appropriate resources:   Identify barriers to discharge with patient and caregiver   Identify discharge learning needs (meds, wound care, etc)   Refer to discharge planning if patient needs post-hospital services based on physician order or complex needs related to functional status, cognitive ability or social support system  Taken 6/20/2022 1208 by Yosvany You RN  Discharge to home or other facility with appropriate resources: Identify barriers to discharge with patient and caregiver     Problem: Chronic Conditions and Co-morbidities  Goal: Patient's chronic conditions and co-morbidity symptoms are monitored and maintained or improved  Outcome: Progressing  Flowsheets (Taken 6/20/2022 1208)  Care Plan - Patient's Chronic Conditions and Co-Morbidity Symptoms are Monitored and Maintained or Improved: Monitor and assess patient's chronic conditions and comorbid symptoms for stability, deterioration, or improvement     Problem: Pain  Goal: Verbalizes/displays adequate comfort level or baseline comfort level  Outcome: Progressing     Problem: Safety - Adult  Goal: Free from fall injury  Outcome: Progressing     Problem: ABCDS Injury Assessment  Goal: Absence of physical injury  Outcome: Progressing     Problem: Musculoskeletal - Adult  Goal: Return mobility to safest level of function  Outcome: Progressing  Goal: Maintain proper alignment of affected body part  Outcome: Progressing  Goal: Return ADL status to a safe level of function  Outcome: Progressing
no

## 2025-07-24 ENCOUNTER — TRANSCRIBE ORDERS (OUTPATIENT)
Dept: ADMINISTRATIVE | Age: 70
End: 2025-07-24

## 2025-07-24 DIAGNOSIS — M47.816 LUMBAR SPONDYLOSIS: ICD-10-CM

## 2025-07-24 DIAGNOSIS — M47.819 FACET ARTHROPATHY: Primary | ICD-10-CM

## 2025-07-24 DIAGNOSIS — M53.9 MULTILEVEL DEGENERATIVE DISC DISEASE: ICD-10-CM

## 2025-07-24 DIAGNOSIS — M43.16 SPONDYLOLISTHESIS AT L4-L5 LEVEL: ICD-10-CM

## 2025-08-04 ENCOUNTER — HOSPITAL ENCOUNTER (OUTPATIENT)
Facility: CLINIC | Age: 70
Discharge: HOME | End: 2025-08-06
Payer: MEDICARE

## 2025-08-04 DIAGNOSIS — M53.9 MULTILEVEL DEGENERATIVE DISC DISEASE: ICD-10-CM

## 2025-08-04 DIAGNOSIS — M43.16 SPONDYLOLISTHESIS AT L4-L5 LEVEL: ICD-10-CM

## 2025-08-04 DIAGNOSIS — M47.816 LUMBAR SPONDYLOSIS: ICD-10-CM

## 2025-08-04 PROCEDURE — 72148 MRI LUMBAR SPINE W/O DYE: CPT

## (undated) DEVICE — SYRINGE MED 30ML STD CLR PLAS LUERLOCK TIP N CTRL DISP

## (undated) DEVICE — SOLUTION IV 250ML 0.9% SOD CHL PH 5 INJ USP VIAFLX PLAS

## (undated) DEVICE — SYRINGE 20ML LL S/C 50

## (undated) DEVICE — SILVER-COATED ANTIMICROBIAL BARRIER DRESSING: Brand: ACTICOAT FLEX3 4" X 4"

## (undated) DEVICE — STAPLER SKIN SQ 30 ABSRB STPL DISP INSORB ORDER VIA PHONE OR EMAIL

## (undated) DEVICE — SYRINGE MED 10ML LUERLOCK TIP W/O SFTY DISP

## (undated) DEVICE — DECANTER FLD 9IN ST BG FOR ASEP TRNSF OF FLD

## (undated) DEVICE — BANDAGE COBAN 4 IN COMPR W4INXL5YD FOAM COHESIVE QUIK STK SELF ADH SFT

## (undated) DEVICE — YANKAUER,FLEXIBLE HANDLE,REGLR CAPACITY: Brand: MEDLINE INDUSTRIES, INC.

## (undated) DEVICE — INTENDED FOR TISSUE SEPARATION, AND OTHER PROCEDURES THAT REQUIRE A SHARP SURGICAL BLADE TO PUNCTURE OR CUT.: Brand: BARD-PARKER ® CARBON RIB-BACK BLADES

## (undated) DEVICE — HYPODERMIC SAFETY NEEDLE: Brand: MAGELLAN

## (undated) DEVICE — GOWN,SIRUS,NONRNF,SETINSLV,XL,20/CS: Brand: MEDLINE

## (undated) DEVICE — GLOVE SURG SZ 8 CRM LTX FREE POLYISOPRENE POLYMER BEAD ANTI

## (undated) DEVICE — BLADE ES L6IN ELASTOMERIC COAT INSUL DURABLE BEND UPTO

## (undated) DEVICE — BLADE SAGITAL 18X90X1.27MM

## (undated) DEVICE — PREP-RESISTANT MARKER W/ RULER: Brand: MEDLINE INDUSTRIES, INC.

## (undated) DEVICE — SOLUTION IV 1000ML 0.9% SOD CHL PH 5 INJ USP VIAFLX PLAS

## (undated) DEVICE — BLANKET WRM W29.9XL79.1IN UP BODY FORC AIR MISTRAL-AIR

## (undated) DEVICE — GAUZE,SPONGE,FLUFF,6"X6.75",STRL,5/TRAY: Brand: MEDLINE

## (undated) DEVICE — GARMENT,MEDLINE,DVT,INT,CALF,MED, GEN2: Brand: MEDLINE

## (undated) DEVICE — SUTURE MONOCRYL SZ 3-0 L27IN ABSRB UD L24MM PS-1 3/8 CIR PRIM Y936H

## (undated) DEVICE — SYRINGE MED 50ML LUERLOCK TIP

## (undated) DEVICE — GLOVE SURG SZ 65 L12IN THK75MIL DK GRN LTX FREE

## (undated) DEVICE — BLADE SAW W12.5XL70MM THK0.8MM CUT THK1.12MM S STL RECIP

## (undated) DEVICE — ZIPPERED TOGA, X-LARGE: Brand: FLYTE

## (undated) DEVICE — PICO 7 10CM X 30CM: Brand: PICO™ 7

## (undated) DEVICE — Device

## (undated) DEVICE — TOURNIQUET SURGICAL EX LG BLACK WHITE HEMACLEAR

## (undated) DEVICE — DRAPE,T,LAPARO,TRANS,STERILE: Brand: MEDLINE

## (undated) DEVICE — SUTURE STRATAFIX SPRL MCRYL + 3 0 SGL ARMED PS 1 24 IN LEN SXMP1B103

## (undated) DEVICE — BLADE ES L6IN ELASTOMERIC COAT EXT DURABLE BEND UPTO 90DEG

## (undated) DEVICE — ELECTRODE PT RET AD L9FT HI MOIST COND ADH HYDRGEL CORDED

## (undated) DEVICE — APPLICATOR MEDICATED 26 CC SOLUTION HI LT ORNG CHLORAPREP

## (undated) DEVICE — 450 ML BOTTLE OF 0.05% CHLORHEXIDINE GLUCONATE IN 99.95% STERILE WATER FOR IRRIGATION, USP AND APPLICATOR.: Brand: IRRISEPT ANTIMICROBIAL WOUND LAVAGE

## (undated) DEVICE — NEPTUNE E-SEP 165MM SUCTION SLEEVE: Brand: NEPTUNE E-SEP

## (undated) DEVICE — GLOVE SURG SZ 8 L12IN FNGR THK79MIL GRN LTX FREE

## (undated) DEVICE — 4-PORT MANIFOLD: Brand: NEPTUNE 2

## (undated) DEVICE — SUTURE VICRYL + SZ 0 L27IN ABSRB UD L36MM CT-1 1/2 CIR VCPP41D

## (undated) DEVICE — BIPOLAR SEALER 23-112-1 AQM 6.0: Brand: AQUAMANTYS ®

## (undated) DEVICE — SUTURE STRATAFIX SPRL SZ 2-0 L9IN ABSRB VLT MH L36MM 1/2 SXPD2B408

## (undated) DEVICE — DRESSING,GAUZE,XEROFORM,CURAD,5"X9",ST: Brand: CURAD

## (undated) DEVICE — CONTAINER,SPECIMEN,OR STERILE,4OZ: Brand: MEDLINE

## (undated) DEVICE — NEEDLE SPNL 18GA L3.5IN W/ QNCKE SHARPER BVL DURA CLICK

## (undated) DEVICE — SOLUTION IRRIG 500ML 0.9% SOD CHLO USP POUR PLAS BTL

## (undated) DEVICE — SUTURE VICRYL + SZ 0 L18IN ABSRB UD L36MM CT-1 1/2 CIR VCP840D

## (undated) DEVICE — STAZ MAJOR PLASTIC: Brand: MEDLINE INDUSTRIES, INC.

## (undated) DEVICE — BANDAGE,GAUZE,BULKEE II,4.5"X4.1YD,STRL: Brand: MEDLINE

## (undated) DEVICE — BANDAGE COMPR W6INXL10YD ST M E WHITE/BEIGE

## (undated) DEVICE — SUTURE STRATAFIX SYMMETRIC PDS + SZ 1 L18IN ABSRB VLT L48MM SXPP1A400

## (undated) DEVICE — STRIP,CLOSURE,WOUND,MEDI-STRIP,1/2X4: Brand: MEDLINE

## (undated) DEVICE — SOLUTION IRRIG 1000ML STRL H2O USP PLAS POUR BTL

## (undated) DEVICE — SUTURE PDS II SZ 2-0 L27IN ABSRB VLT L26MM CT-2 1/2 CIR Z333H

## (undated) DEVICE — SUTURE STRATAFIX SPRL SZ 3-0 L5IN ABSRB UD FS L26MM 3/8 CIR SXMP2B411

## (undated) DEVICE — COVER,MAYO STAND,XL,STERILE: Brand: MEDLINE

## (undated) DEVICE — GLOVE SURG SZ 85 CRM LTX FREE POLYISOPRENE POLYMER BEAD ANTI

## (undated) DEVICE — TOWEL,OR,DSP,ST,BLUE,DLX,XR,4/PK,20PK/CS: Brand: MEDLINE

## (undated) DEVICE — 3M™ IOBAN™ 2 ANTIMICROBIAL INCISE DRAPE 6650EZ: Brand: IOBAN™ 2

## (undated) DEVICE — SUTURE VCRL SZ 2-0 L36IN ABSRB UD L36MM CT-1 1/2 CIR J945H

## (undated) DEVICE — SUTURE PDS II SZ 2-0 L27IN ABSRB VLT L36MM CT-1 1/2 CIR Z339H

## (undated) DEVICE — MARKER,SKIN,WI/RULER AND LABELS: Brand: MEDLINE

## (undated) DEVICE — NEEDLE, QUINCKE, 18GX3.5": Brand: MEDLINE

## (undated) DEVICE — DECANTER BAG 9": Brand: MEDLINE INDUSTRIES, INC.

## (undated) DEVICE — TUBING, SUCTION, 1/4" X 12', STRAIGHT: Brand: MEDLINE

## (undated) DEVICE — BIPOLAR SEALER 23-113-1 AQM 2.3 OM NEURO: Brand: AQUAMANTYS ®

## (undated) DEVICE — SPONGE LAP W18XL18IN WHT COT 4 PLY FLD STRUNG RADPQ DISP ST 2 PER PACK

## (undated) DEVICE — SUTURE PDS II SZ 2-0 L27IN ABSRB VLT SH L26MM 1/2 CIR Z317H

## (undated) DEVICE — RECIPROCATING BLADE, DOUBLE SIDED, OFFSET  (70.0 X 0.64 X 12.6MM)

## (undated) DEVICE — 2108 SERIES SAGITTAL BLADE, NO OFFSET  (18.6 X 1.24 X 105MM)